# Patient Record
Sex: FEMALE | Race: WHITE | Employment: OTHER | URBAN - METROPOLITAN AREA
[De-identification: names, ages, dates, MRNs, and addresses within clinical notes are randomized per-mention and may not be internally consistent; named-entity substitution may affect disease eponyms.]

---

## 2017-03-01 ENCOUNTER — HOSPITAL ENCOUNTER (OUTPATIENT)
Dept: MAMMOGRAPHY | Age: 77
Discharge: HOME | End: 2017-03-01
Attending: INTERNAL MEDICINE
Payer: MEDICARE

## 2017-03-01 DIAGNOSIS — I97.2 POSTMASTECTOMY LYMPHEDEMA SYNDROME: ICD-10-CM

## 2017-03-01 DIAGNOSIS — Z85.3 PERSONAL HISTORY OF MALIGNANT NEOPLASM OF BREAST: ICD-10-CM

## 2017-03-01 PROCEDURE — 77062 BREAST TOMOSYNTHESIS BI: CPT

## 2017-03-29 ENCOUNTER — HOSPITAL ENCOUNTER (OUTPATIENT)
Dept: PHYSICAL THERAPY | Age: 77
Discharge: HOME | End: 2017-03-29

## 2017-03-29 NOTE — PROGRESS NOTES
[address] [facility]  [address]  [place] [address]      INITIAL EVALUATION    NAME: Chad Smith AGE: 78 y.o. GENDER: female  DATE: 3/29/2017  REFERRING PHYSICIAN: Barbara Albert MD  HISTORY AND BACKGROUND: Patient is a 77 y/o breast cancer survivor with initial diagnosis 12/2013, IDC, T1cN1, triple negative. Per oncology note, pt underwent neoadjuvant chemotherapy, with 1.9 cm tumor, 2+/14 lymph nodes ALND, grade 3. Pt reports early onset of lymphedema L UE, however, states she has not had consistent management of condition, with progression of swelling/skin and tissue changes significant over the past 2 years. See further medical history and medications as noted below. Primary Diagnosis:  · L UE lymphedema, secondary (I89.0)  Other Treatment Diagnoses:   Swelling not relieved by elevation (R60.0 localized edema)   Lack of coordination (Ataxic R27.0; other lack of coordination R27.8; unspecified lack of coordination R27.9)   Malignant neoplasm of breast (C50.919)  Morbid Obesity (E66.01)  Date of Onset: 12/2013; progression over the past 2 years. Present Symptoms and Functional Limitations: L UE swelling, limiting appropriate fit of shirts/blouses, unable to wear wedding rings, pain/tightness axillary region.     Lymphedema Life Impact Scale: 29/72; 43% impairment  Past Medical History:   Past Medical History:   Diagnosis Date    Breast cancer ([facility] [address] 2015    Left    Neurological disorder     chemotherapy induced neuropathy    Radiation therapy complication 8040    left breast ca     Past Surgical History:   Procedure Laterality Date    HX BREAST BIOPSY Left yrs    neg; stereotactic    HX BREAST LUMPECTOMY Left 2015    HX BREAST REDUCTION Bilateral yrs ago     Current Medications:    Per MD note/patient report:  Duloxetine 60 mg daily; Gabapentin 600 mg 2x/day; lorazepam 1 mg prn; omeprazole 20 mg daily, zolpidem 10 mg at bedtime; Celecoxib 200 mg daily; biotin; Aminophylline. No current facility-administered medications for this encounter. 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 Pt reports lymphedema onset after lumpectomy/ALND, however, has become progressively worse over the past 2 years. Living Situation: lives with spouse, one daughter who lives locally. Trainable Caregiver?: yes, spouse. Self-care/ADLs: indep     Mobility: indep   Sleeping Arrangement:  bed   Adaptive Equipment Owned: none  Previous Therapy:  Limited, intermittent at best, to address lymphedema. Compression/Lymphedema Equipment:  Multiple compression sleeves, however, has not worn them regularly and did not bring to appt. SUBJECTIVE:   \"My arm is getting worse. \"  Pt states she wants to pursue treatment of L UE lymphedema, and understands that she needs consistency for optimal management of condition. Pt states she has tried over the counter compression sleeves without successful fit/comfort. Patients goals for therapy: decrease R UE limb size and learn how to manage lymphedema. OBJECTIVE DATA SUMMARY:   EXAMINATION/PRESENTATION/DECISION MAKING:   Pain:  Pain Scale 1: Numeric (0 - 10)  Pain Intensity 1: 2  Pain Location 1: Arm    Skin and Tissue Assessment:  Dermal Status:  ( x)  Intact ( )  Dry   ( )  Tenuous ( )  Flaky   ( )  Wound/lesion present (x )  Scars: breast incisions well healed, no increase in tension; axillary incision adherent, moderate tension. Note heavy cording with skin traction axilla to proximal upper arm.    ( )  Dermatitis    Texture/Consistency:  ( x)  Boggy ( )  Pitting Edema   ( )  Brawny ( )  Combination   (x )  Fibrotic/Woody: forearm    Pigmentation/Color Change:  ( )  Normal ( )  Hemosiderin   ( )  Red ( )  Erythematous   ( x)  Hyperpigmented: mild L UE ( )  Hyperlipodermatosclerosis   Anomalies: na  ( )  Lymphorrhea ( )  Vesicles   ( )  Petechiae ( )  Warty Vercusis   ( )  Bullae ( )  Papilloma   Circulatory:  ( )  MALNIA ( )  Varicosities:   (x )  Pulses: radial palpable bilateral UE ( )  Vascular studies ruled out DVT in past   ( )  DVT History    Nails:  (x )  Normal  ( )  Fungus  Stemmers Sign: positive L    Height:  Height: 5' 1\" (154.9 cm)  Weight:  Weight: 60.9 kg (134 lb 3.2 oz)   BMI:  BMI (calculated): 25.4  (36 or greater: adversely affecting lymphedema)  Volumetric Measurements:   Right:  1759.71 mL Left:  2086.37mL   % Difference: 18.56% Dominance: R UE   (See scanned graph)  Range of Motion: bilateral UE WFL all joints, all motions. Strength: bilateral UE grossly 5/5 with MMT. Sensation:  Decreased distal fingers, neuropathic pain bilateral LE, feet/mid lower leg. Mobility:  Bed/Chair Mobility:  indep Transfers:  indep   Sitting Balance:  good Standing Balance:  good   Gait:  indep without device. Wheelchair Mobility:  na   Endurance:  Intact for gait community distances, normal daily activities, travel. Stairs:  Not assessed. Safety:  Patient is alert and oriented:  x4   Safety awareness:  intact   Fall Risk?:  Low, neuropathy   Patient given written fall prevention handout: Yes   Precautions:  Standard lymphedema precautions to include avoiding blood pressure readings, injections and IVs or other procedures/acts that could lead to broken skin on affected area, and avoiding excessive heat, resistive activity or altitude without compression garment    Functional Measure:   LLIS  In compliance with CMSs Claims Based Outcome Reporting, the following G-code set was chosen for this patient based on their primary functional limitation being treated:     The outcome measure chosen to determine the severity of the functional limitation was the LLIS with a score of 29/72 which was correlated with the impairment scale. ? Other PT/OT Primary Functional Limitations:     - CURRENT STATUS: CK - 40%-59% impaired, limited or restricted    - GOAL STATUS: CJ - 20%-39% impaired, limited or restricted    - D/C STATUS:  ---------------To be determined---------------     Physical Therapy Evaluation Charge Determination   History Examination Presentation Decision-Making   MEDIUM  Complexity : 1-2 comorbidities / personal factors will impact the outcome/ POC  MEDIUM Complexity : 3 Standardized tests and measures addressing body structure, function, activity limitation and / or participation in recreation  MEDIUM Complexity : Evolving with changing characteristics  MEDIUM Complexity : FOTO score of 26-74      Based on the above components, the patient evaluation is determined to be of the following complexity level: MEDIUM    Evaluation Time: 9:40-10:12 am  32 minutes    TREATMENT PROVIDED:   1. Treatment description:  The patient was educated regarding the role and function of the lymphatic system, and instructed in the lymphedema management protocol of complete decongestive therapy (CDT). This includes skin care to prevent breakdown or infection, lymphedema exercises, custom compression therapy options (bandaging, compression garments, compression pump, Jon Ripper, [employer]Pak, [employer], etc. as needed), and decongestion with manual lymphatic drainage as indicated. We discussed the need for consistent compression system for lymphedema management. Diaphragmatic breathing instruction and skin care education was performed,applying low pH lotion to extremity using upward strokes to stimulate lymphatic vessels. Pt was given information regarding obtaining bandaging supplies. Initiated MLD today with upper quadrant sequence performed followed by therapist applying lotion L UE upward strokes, with pt to continue day/night.     Treatment time:  10:14-10:59 am  Minutes: 44 minutes    Manual therapy 3    2. Treatment description:  Pt instructed in the following exercises performing with assist of therapist:  In supine, hands behind head with with shoulder abd 90, holding for 30 seconds at comfortable tension axilla, repeating x 3 reps, 2x/day. Standing shoulder abd to 90 with wrist extension, alternating elbow flex holding 10 seconds, full extension holding 10 seconds, at comfortable point of tension axilla, 10 reps, repeating 2-3x/day. Pt demonstrates good understanding of HEP. Treatment time:  11:00-11:12 am   Minutes: 12 minutes    Therapeutic exercise 1    ASSESSMENT:   Lisa Carr is a 78 y.o. female who presents with stage II moderate lymphedema, L UE, breast cancer related. Lymphedema is long-standing, however, has progressed over the past 2 years, continuing to become more severe in nature, with patient reporting she has not had consistent management of lymphedema since cancer diagnosis and treatment in 2013. Note mild hand involvement, with pt no longer able to wear wedding rings on that hand. Pt has limited knowledge regarding lymphedema pathophysiology or management of condition, however, is motivated to proceed with treatment. Pt will likely require day/night time compression, and would benefit from vasopneumatic pump for home management based on response to treatment in clinic. Pt agreeable to proceeding with treatment, including compression bandaging, stating spouse will be agreeable to assist her with bandaging. Neuropathy may interfere with independent management of compression bandaging/garments. Patient will benefit from complete decongestive therapy (CDT) including manual lymphatic drainage (MLD) technique, short-stretch textile bandages/compression system to decongest limb, and kinesiotaping as appropriate.   Patient will receive instruction in proper skin care to recognize signs/symptoms of and prevent infection, therapeutic exercise, and self-MLD for independent home program and restorative lymphatic performance. This care is medically necessary due to the infection risk with lymphedema, and to improve functional activities. CDT is necessary to resolve swelling to allow patient to return to wearing normal clothes/footwear, and prevent worsening of symptoms, such as venous stasis ulcerations, infections, or hospitalizations. Patient will be independent with home program strategies to allow improved ADL ability and mobility and to allow patient to return to greatest functional independence. Rehabilitation potential is considered to be Good. Factors which may influence rehabilitation potential include neuropathy UE/LE, long-standing lymphedema increasing in severity, progressed to stage II with hand involvement noted. Patient will benefit from 1-2x/week physical therapy visits over 4-8 weeks to optimize improvement in these areas. PLAN OF CARE:   Recommendations and Planned Interventions:  Manual lymph drainage/complete decongestive therapy  Multi-layer compression bandaging (short-stretch)  Compression garment fitting/provision  Lymphedema therapeutic exercise  AROM/PROM/Strength/Coordination  Self-care training  Functional mobility training  Education in skin care and lymphedema precautions  Self-MLD education per home program  Self-bandaging education per home program  Caregiver education as needed  Wound care as needed     GOALS  Short term goals  Time frame: 2-4 weeks  1. Instruct the patient to be independent with proper skin care to prevent future skin breakdown and decrease the potential risk for infections that are associated with Lymphedema. 2. Patient will be independent with a personal lymphedema exercise program to assist with the lymphatic flow and reduce limb volume L UE by 200-250 mL. 3. Patient will understand the signs and symptoms of acute infection. Long term goals  Time frame: 4-8 weeks    1.    Patient will have knowledge of the compression options and acquire a safe and   appropriate daytime and night time compression system to prevent    re-accumulation of fluid. 2.  Patient or family will be able to don/doff garments independently. Garment   system effectiveness will be evaluated prior to discharge for better long-term   management and outcomes. 3.  Improvement in LLIS by 4-6 points. 4.   To transition patient to independent restorative phase of CDT. Patient has participated in goal setting and agrees to work toward plan of care. Patient was instructed to call if questions or concerns arise. Thank you for this referral.  Marla Solo, PT, CLT   Time Calculation: 92 mins    TREATMENT PLAN EFFECTIVE DATES:   3/29/2017 TO 6/25/2017  I have read the above plan of care for Karen Beltrán. I certify the above prescribed services are required by this patient and are medically necessary.   The above plan of care has been developed in conjunction with the lymphedema/physical therapist.       Physician Signature: ____________________________________Date:______________

## 2017-03-30 VITALS
BODY MASS INDEX: 25.34 KG/M2 | DIASTOLIC BLOOD PRESSURE: 82 MMHG | SYSTOLIC BLOOD PRESSURE: 138 MMHG | WEIGHT: 134.2 LBS | HEIGHT: 61 IN | HEART RATE: 73 BPM | OXYGEN SATURATION: 96 %

## 2017-04-03 ENCOUNTER — HOSPITAL ENCOUNTER (OUTPATIENT)
Dept: PHYSICAL THERAPY | Age: 77
Discharge: HOME | End: 2017-04-03
Payer: MEDICARE

## 2017-04-03 PROCEDURE — 97110 THERAPEUTIC EXERCISES: CPT

## 2017-04-03 PROCEDURE — 97140 MANUAL THERAPY 1/> REGIONS: CPT

## 2017-04-03 NOTE — PROGRESS NOTES
[facility]  Lymphedema Clinic and Cancer Rehabilitation  [facility]  [address] [place] [address],[address]  [place]      LYmphedema Therapy  Visit: 2    [x]        Daily note               []       30 day/10th visit progress note    NAME: Patria Nicole  DATE: 4/3/2017    GOALS  Short term goals  Time frame: 2-4 weeks  1. Instruct the patient to be independent with proper skin care to prevent future skin breakdown and decrease the potential risk for infections that are associated with Lymphedema. 2. Patient will be independent with a personal lymphedema exercise program to assist with the lymphatic flow and reduce limb volume L UE by 200-250 mL. 3. Patient will understand the signs and symptoms of acute infection. Long term goals  Time frame: 4-8 weeks     1. Patient will have knowledge of the compression options and acquire a safe and   appropriate daytime and night time compression system to prevent    re-accumulation of fluid. 2. Patient or family will be able to don/doff garments independently. Garment   system effectiveness will be evaluated prior to discharge for better long-term   management and outcomes. 3. Improvement in LLIS by 4-6 points. 4. To transition patient to independent restorative phase of CDT. SUBJECTIVE REPORT:  Pt arrives for treatment today stating she has order bandaging supplies, however, has not received them yet. Pt agreeable to proceeding with treatment today, including compression bandaging. Pt states she has continued HEP as instructed. Pain: 1/10, axilla/upper arm. Gait:  indep  ADLs:  indep  Treatment Response:  Pt required assistance to don bra secondary to bandaging application. Pt states spouse will assist.  Pt provided with information regarding arm covering to allow her to shower at home. Reviewed packet received at evaluation.   Function: see eval.  Began MLD (manual lymphatic drainage) techniques with lotioning and skin care and range of motion exercise routine without props. TREATMENT AND OBJECTIVE DATA SUMMARY:       Therapeutic Exercise/Procedure: 10:52-11:05 am 13 minutes             Free exercises/ROM: Shoulder flex/abd/ER ROM x 10. Reviewed shoulder abd at 90 paired with shoulder ER/elbow and wrist extension, to continue 3x/day with pt tolerating well in clinic. Home program: Patient to perform daily to BID skin care, deep abdominal breathing, exercise routine. Rationale: Exercise will increase the lymph angiomotoricity and tissue pressure of the skin and thus decrease swelling. Manual Therapy 59 minutes   Treatment time: 9:51-10:50 am  Area to decongest: L UE   Sequence used and effectiveness: Upper quadrant sequence and L UE sequence to elbow. Stim L inguinal/R axillary nodes;activated AAA/CLAUDIO/L AI anastomoses. L breast sequence stationary circles x 5, repeating 5 pathways lateral to medial, stim parasternal nodes. Repeated sequence L posterior upper quadrant. UE sequence to elbow. Skin traction/axillary cording techniques including lifting/gliding. Pt tolerated well. Heavy cording in axilla. Skin/wound care/debridement: Intact. Applied Eucerin lotion using upward strokes to stimulate lymphatic vessels. Upper extremity compression: L UE  Finger wraps  stockinette  Cast padding  Comprilan 6, 8 cm x 2    Pt provided with compression bandaging management at home as follows:  Compression bandaging instructions:  1. Compression bandaging will be applied twice a week by therapist in clinic, with adjustments to be made at home as indicated if bandaging becomes loose or uncomfortable.     2. If tolerated, remain bandaged between appointments with therapist, removing under the following conditions--DO NOT WAIT FOR A RETURN PHONE Shu 69:    -Numbness/tingling in extremity different from what you have experienced without the bandages in place.  -Compromise in circulation, monitoring blood refill into toes after applying gentle pressure to toes.  -Onset of pain in extremity that is sudden and severe in nature. -Redness, warmth in limb, and/or fever, flu-like symptoms, which may indicate infection. If this occurs, call your doctor right away. If you note a sudden increase in swelling in extremity, with or without redness/warmth, go to the emergency room as soon as possible to have blood clot ruled out. 3. Compression bandaging supplies that can be laundered are the brown compression wraps and any foam applied for padding. Launder in washer/dryer with NO fabric softener, bleach, woolite. Dry on a low heat. 4. Once compression garments are ordered, compression bandaging will be continued until garments arrive for fitting. This process can take several weeks. Kinesiotaping: karel   Girth/Volume measurement: Reviewed results of volumetric measurements taken on evaluation. TOTAL TREATMENT 74 mins     Circumferential Limb Measurements:  Affected Side:L UE   Left (cm) Right (cm)   Base 3rd finger 6.2           Palm 18.7           Wrist 15.4           Mid Forearm 26.4           Elbow 26.5           Axilla 30.8           Length                   ASSESSMENT:   Treatment effectiveness and tolerance: Pt tolerated treatment well. Pt may require assistance with donning bra/housework/cooking during bandaging phase of treatment or until she accommodates to bandaging. Pt agreeable to continuing HEP as instructed. Progress toward goals: Ongoing. PLAN OF CARE:   Changes to the plan of care: Assess response to compression bandaging. Repeat limb volume measurements. Assess compression garment patient currently owns, which she is to bring to next scheduled appt. Measure for appropriate compression garments per limb volume goals as noted above.    Frequency: 1-2x/week   Other: karel Hernandez, PT, CLT

## 2017-04-06 ENCOUNTER — HOSPITAL ENCOUNTER (OUTPATIENT)
Dept: PHYSICAL THERAPY | Age: 77
Discharge: HOME | End: 2017-04-06
Payer: MEDICARE

## 2017-04-06 PROCEDURE — 97140 MANUAL THERAPY 1/> REGIONS: CPT

## 2017-04-06 PROCEDURE — 97110 THERAPEUTIC EXERCISES: CPT

## 2017-04-06 NOTE — PROGRESS NOTES
[facility]  Lymphedema Clinic and Cancer Rehabilitation  [address] [place] [address]  [address] [place] [address],[address]  Francisca Black      LYmphedema Therapy  Visit: 3    [x]        Daily note               []       30 day/10th visit progress note    NAME: Linnette Hernandez  DATE: 4/6/2017    GOALS  Short term goals  Time frame: 2-4 weeks  1. Instruct the patient to be independent with proper skin care to prevent future skin breakdown and decrease the potential risk for infections that are associated with Lymphedema. 2. Patient will be independent with a personal lymphedema exercise program to assist with the lymphatic flow and reduce limb volume L UE by 200-250 mL. 3. Patient will understand the signs and symptoms of acute infection. Long term goals  Time frame: 4-8 weeks     1. Patient will have knowledge of the compression options and acquire a safe and   appropriate daytime and night time compression system to prevent    re-accumulation of fluid. 2. Patient or family will be able to don/doff garments independently. Garment   system effectiveness will be evaluated prior to discharge for better long-term   management and outcomes. 3. Improvement in LLIS by 4-6 points. 4. To transition patient to independent restorative phase of CDT. SUBJECTIVE REPORT:  Pt arrives for treatment today with bandaging supplies. States she did okay in compression bandaging L UE, arriving with bandaging intact. Pt agreeable to continuing with treatment today, including compression bandaging. Pt states she has continued HEP as instructed. Pain: 1/10, axilla/upper arm. Gait:  indep  ADLs:  indep  Treatment Response:  Pt required assistance to don bra secondary to bandaging application. Pt states spouse will assist.  Pt provided with information regarding arm covering to allow her to shower at home. Reviewed packet received at evaluation.   Function: see brittny.  Began MLD (manual lymphatic drainage) techniques with lotioning and skin care and range of motion exercise routine without props. TREATMENT AND OBJECTIVE DATA SUMMARY:       Therapeutic Exercise/Procedure: 10:59-11:010 am 11 minutes             Free exercises/ROM: Shoulder flex/abd/ER ROM x 10. Reviewed shoulder abd at 90 paired with shoulder ER/elbow and wrist extension, to continue 3x/day with pt tolerating well in clinic. Pt performs the following ex in clinic with assist of therapist:  Cervical lateral flex/rotation R/L, scapular elevation/depression, protraction/retraction, arm swings, towel raises, paddling x 5 reps. Pt advised to continue 2x/day. Home program: Patient to perform daily to BID skin care, deep abdominal breathing, exercise routine. Rationale: Exercise will increase the lymph angiomotoricity and tissue pressure of the skin and thus decrease swelling. Manual Therapy 65 minutes   Treatment time: 9:50-10:55 am  Area to decongest: L UE   Sequence used and effectiveness: Upper quadrant sequence and L UE sequence to elbow. Stim L inguinal/R axillary nodes;activated AAA/CLAUDIO/L AI anastomoses. L breast sequence stationary circles x 5, repeating 5 pathways lateral to medial, stim parasternal nodes. Repeated sequence L posterior upper quadrant. Full UE sequence performed. Skin traction/axillary cording techniques including lifting/gliding. Pt tolerated well. Heavy cording in axilla. STM L pect major/minor. Skin/wound care/debridement: Intact. Applied Eucerin lotion using upward strokes to stimulate lymphatic vessels. Upper extremity compression: L UE  Finger wraps  stockinette  Biafleece  Comprilan 6, 8 cm x 2    Pt provided with compression bandaging management at home as follows:  Compression bandaging instructions:  1.   Compression bandaging will be applied twice a week by therapist in clinic, with adjustments to be made at home as indicated if bandaging becomes loose or uncomfortable. 2. If tolerated, remain bandaged between appointments with therapist, removing under the following conditions--DO NOT WAIT FOR A RETURN PHONE CALL FROM CLINIC:    -Numbness/tingling in extremity different from what you have experienced without the bandages in place.  -Compromise in circulation, monitoring blood refill into toes after applying gentle pressure to toes.  -Onset of pain in extremity that is sudden and severe in nature. -Redness, warmth in limb, and/or fever, flu-like symptoms, which may indicate infection. If this occurs, call your doctor right away. If you note a sudden increase in swelling in extremity, with or without redness/warmth, go to the emergency room as soon as possible to have blood clot ruled out. 3. Compression bandaging supplies that can be laundered are the brown compression wraps and any foam applied for padding. Launder in washer/dryer with NO fabric softener, bleach, woolite. Dry on a low heat. 4. Once compression garments are ordered, compression bandaging will be continued until garments arrive for fitting. This process can take several weeks. Kinesiotaping: na   Girth/Volume measurement: Repeated limb volumes with slight elevation noted. Improvement in breast and hand swelling visibly noted. See scanned graph. TOTAL TREATMENT 80 mins     ASSESSMENT:   Treatment effectiveness and tolerance: Pt tolerated treatment well. Pt may require assistance with donning bra/housework/cooking during bandaging phase of treatment or until she accommodates to bandaging. Pt agreeable to continuing HEP as instructed. Progress toward goals: Ongoing. PLAN OF CARE:   Changes to the plan of care: Assess continued response to compression bandaging. Assess compression garment patient currently owns, which she is to bring to next scheduled appt.   Measure for appropriate compression garments per limb volume goals as noted above.   Frequency: 1-2x/week   Other: na       Kalina Bender, PT, CLT

## 2017-04-10 ENCOUNTER — HOSPITAL ENCOUNTER (OUTPATIENT)
Dept: PHYSICAL THERAPY | Age: 77
Discharge: HOME | End: 2017-04-10
Payer: MEDICARE

## 2017-04-10 PROCEDURE — 97016 VASOPNEUMATIC DEVICE THERAPY: CPT

## 2017-04-10 PROCEDURE — 97140 MANUAL THERAPY 1/> REGIONS: CPT

## 2017-04-10 NOTE — PROGRESS NOTES
[facility]  Lymphedema Clinic and Cancer Rehabilitation  [address] [place] [address]  [address] [place] [address],[address]  Francisca Black      LYmphedema Therapy  Visit: 3    [x]        Daily note               []       30 day/10th visit progress note    NAME: Linnette Hernandez  DATE: 4/10/2017    GOALS  Short term goals  Time frame: 2-4 weeks  1. Instruct the patient to be independent with proper skin care to prevent future skin breakdown and decrease the potential risk for infections that are associated with Lymphedema. 2. Patient will be independent with a personal lymphedema exercise program to assist with the lymphatic flow and reduce limb volume L UE by 200-250 mL. 3. Patient will understand the signs and symptoms of acute infection. Long term goals  Time frame: 4-8 weeks     1. Patient will have knowledge of the compression options and acquire a safe and   appropriate daytime and night time compression system to prevent    re-accumulation of fluid. 2. Patient or family will be able to don/doff garments independently. Garment   system effectiveness will be evaluated prior to discharge for better long-term   management and outcomes. 3. Improvement in LLIS by 4-6 points. 4. To transition patient to independent restorative phase of CDT. SUBJECTIVE REPORT:  Pt arrives for treatment today with bandaging supplies. States she did okay in compression bandaging L UE, arriving with bandaging intact. Pt agreeable to continuing with treatment today, including compression bandaging. Pt states she has continued HEP as instructed. Pain: 1/10, axilla/upper arm. Gait:  indep  ADLs:  indep  Treatment Response:  Pt required assistance to don bra secondary to bandaging application. Pt states spouse will assist.  Pt provided with information regarding arm covering to allow her to shower at home. Reviewed packet received at evaluation.   Function: see brittny.  Began MLKEIKO (manual lymphatic drainage) techniques with lotioning and skin care and range of motion exercise routine without props. TREATMENT AND OBJECTIVE DATA SUMMARY:       Therapeutic Exercise/Procedure:               Free exercises/ROM: Shoulder flex/abd/ER ROM x 10. Pt to continue all exercises at home as previously instructed within limits of pain: shoulder abd at 90 paired with shoulder ER/elbow and wrist extension, to continue 3x/day with pt tolerating well in clinic. Cervical lateral flex/rotation R/L, scapular elevation/depression, protraction/retraction, arm swings, towel raises, paddling x 5 reps. Pt advised to continue 2x/day. Home program: Patient to perform daily to BID skin care, deep abdominal breathing, exercise routine. Rationale: Exercise will increase the lymph angiomotoricity and tissue pressure of the skin and thus decrease swelling. Manual Therapy: 10:47-11:05 am  Vasopneumatic pump trial:  9:35-10:45 am 18 minutes  70 minutes   Treatment time:   Area to decongest: L UE   Sequence used and effectiveness: Vasopneumatic pump trial with Tactile . Skin/wound care/debridement: Intact. Applied Eucerin lotion using upward strokes to stimulate lymphatic vessels. Upper extremity compression: L UE  Finger wraps  stockinette  Cast padding  Biafleece  Comprilan 6, 8 cm x 2    Pt provided with compression bandaging management at home as follows:  Compression bandaging instructions:  1. Compression bandaging will be applied twice a week by therapist in clinic, with adjustments to be made at home as indicated if bandaging becomes loose or uncomfortable.     2. If tolerated, remain bandaged between appointments with therapist, removing under the following conditions--DO NOT WAIT FOR A RETURN PHONE CALL FROM CLINIC:    -Numbness/tingling in extremity different from what you have experienced without the bandages in place.  -Compromise in circulation, monitoring blood refill into toes after applying gentle pressure to toes.  -Onset of pain in extremity that is sudden and severe in nature. -Redness, warmth in limb, and/or fever, flu-like symptoms, which may indicate infection. If this occurs, call your doctor right away. If you note a sudden increase in swelling in extremity, with or without redness/warmth, go to the emergency room as soon as possible to have blood clot ruled out. 3. Compression bandaging supplies that can be laundered are the brown compression wraps and any foam applied for padding. Launder in washer/dryer with NO fabric softener, bleach, woolite. Dry on a low heat. 4. Once compression garments are ordered, compression bandaging will be continued until garments arrive for fitting. This process can take several weeks. Kinesiotaping: na   Girth/Volume measurement: See girth measurements below   Affected Side: L UE   Left (cm) Right (cm)   Base 3rd finger 6.2           Palm 18.7           Wrist 15.7           Mid Forearm 25.8           Elbow 25.7           Axilla  30.8           Length                 TOTAL TREATMENT 95 mins     ASSESSMENT:   Treatment effectiveness and tolerance: Pt tolerated treatment well. Pt may require assistance with donning bra/housework/cooking during bandaging phase of treatment or until she accommodates to bandaging. Pt agreeable to continuing HEP as instructed. Good response to pump trial.     Progress toward goals: Ongoing. PLAN OF CARE:   Changes to the plan of care: Assess continued response to compression bandaging. Measure for appropriate compression garments per limb volume goals as noted above. Vasopneumatic pump medically necessary for success with home management of lymphedema. Pt does have L breast lymphedema involvement, with would benefit from upgraded pump, however, currently eligible for extremity pump thru Medicare.    Frequency: 1-2x/week   Other: na Sylvester Repress, PT, CLT

## 2017-04-13 ENCOUNTER — HOSPITAL ENCOUNTER (OUTPATIENT)
Dept: PHYSICAL THERAPY | Age: 77
Discharge: HOME | End: 2017-04-13
Payer: MEDICARE

## 2017-04-13 PROCEDURE — 97140 MANUAL THERAPY 1/> REGIONS: CPT

## 2017-04-13 NOTE — PROGRESS NOTES
[facility]  Lymphedema Clinic and Cancer Rehabilitation  [address] [facility]  [address] [place] [address],[address]  Francisca Black      LYmphedema Therapy  Visit: 4    [x]        Daily note               []       30 day/10th visit progress note    NAME: Missy Hurt  DATE: 4/13/2017    GOALS  Short term goals  Time frame: 2-4 weeks  1. Instruct the patient to be independent with proper skin care to prevent future skin breakdown and decrease the potential risk for infections that are associated with Lymphedema. 2. Patient will be independent with a personal lymphedema exercise program to assist with the lymphatic flow and reduce limb volume L UE by 200-250 mL. 3. Patient will understand the signs and symptoms of acute infection. Long term goals  Time frame: 4-8 weeks     1. Patient will have knowledge of the compression options and acquire a safe and   appropriate daytime and night time compression system to prevent    re-accumulation of fluid. 2. Patient or family will be able to don/doff garments independently. Garment   system effectiveness will be evaluated prior to discharge for better long-term   management and outcomes. 3. Improvement in LLIS by 4-6 points. 4. To transition patient to independent restorative phase of CDT. SUBJECTIVE REPORT:  Pt arrives for treatment today with bandaging supplies. States she took a shower just before coming to her appointment and some of the bandages on her hand got wet. Pain: Patient expressing pain in bilateral thumbs due to arthritis. Gait:  indep  ADLs:  indep  Treatment Response:  Pt tolerated MLD and MLB well. .  Reviewed packet received at evaluation. Function: see eval.  Began MLD (manual lymphatic drainage) techniques with lotioning and skin care and range of motion exercise routine without props.     TREATMENT AND OBJECTIVE DATA SUMMARY:       Therapeutic Exercise/Procedure:               Free exercises/ROM: Shoulder flex/abd/ER ROM x 10. Pt to continue all exercises at home as previously instructed within limits of pain: shoulder abd at 90 paired with shoulder ER/elbow and wrist extension, to continue 3x/day with pt tolerating well in clinic. Cervical lateral flex/rotation R/L, scapular elevation/depression, protraction/retraction, arm swings, towel raises, paddling x 5 reps. Pt advised to continue 2x/day. Home program: Patient to perform daily to BID skin care, deep abdominal breathing, exercise routine. Rationale: Exercise will increase the lymph angiomotoricity and tissue pressure of the skin and thus decrease swelling. Manual Therapy:   Vasopneumatic pump       Treatment time:   Area to decongest: L UE   Sequence used and effectiveness: MLD:             Upper quadrant sequence and L UE sequence to elbow. Stim L inguinal/R axillary nodes;activated AAA/CLAUDIO/L AI anastomoses. L breast sequence stationary circles x 5,  Repeated sequence L posterior upper quadrant. Full UE sequence performed.            Skin/wound care/debridement: Intact. Applied Eucerin lotion using upward strokes to stimulate lymphatic vessels. Upper extremity compression: L UE  Finger wraps/coban  stockinette  Cast padding  Biafleece  Comprilan 4 cm x 2, 8, & 10 cm      ADDENDUM:  Patient returned to the clinic later in the afternoon due to the coban bothering her arthritic thumb. Coban was removed, finger wraps intact and patient's LUE was re-wrapped as above without the coban. Patient without complaints at that time. Pt provided with compression bandaging management at home as follows:  Compression bandaging instructions:  1. Compression bandaging will be applied twice a week by therapist in clinic, with adjustments to be made at home as indicated if bandaging becomes loose or uncomfortable.     2. If tolerated, remain bandaged between appointments with therapist, removing under the following conditions--DO NOT WAIT FOR A RETURN PHONE CALL FROM CLINIC:    -Numbness/tingling in extremity different from what you have experienced without the bandages in place.  -Compromise in circulation, monitoring blood refill into toes after applying gentle pressure to toes.  -Onset of pain in extremity that is sudden and severe in nature. -Redness, warmth in limb, and/or fever, flu-like symptoms, which may indicate infection. If this occurs, call your doctor right away. If you note a sudden increase in swelling in extremity, with or without redness/warmth, go to the emergency room as soon as possible to have blood clot ruled out. 3. Compression bandaging supplies that can be laundered are the brown compression wraps and any foam applied for padding. Launder in washer/dryer with NO fabric softener, bleach, woolite. Dry on a low heat. 4. Once compression garments are ordered, compression bandaging will be continued until garments arrive for fitting. This process can take several weeks. Kinesiotaping: na   Girth/Volume measurement: See girth measurements below   Affected Side: L UE   Left (cm) Right (cm)   Base 3rd finger     6.5        Palm  18.5           Wrist     15.3        Mid Forearm     26.0        Elbow 25.9        Axilla 31.0        Length                 TOTAL TREATMENT 75 mins     ASSESSMENT:   Treatment effectiveness and tolerance: Pt tolerated treatment well. Gross measurements improved since last visit following MLB. See above. Progress toward goals: Ongoing. PLAN OF CARE:   Changes to the plan of care: Assess continued response to compression bandaging. Measure for appropriate compression garments. Vasopneumatic pump medically necessary for success with home management of lymphedema. Pt does have L breast lymphedema involvement, with would benefit from upgraded pump, however, currently eligible for extremity pump thru Medicare.    Frequency: 1-2x/week Other: karel OLVERA

## 2017-04-17 ENCOUNTER — HOSPITAL ENCOUNTER (OUTPATIENT)
Dept: PHYSICAL THERAPY | Age: 77
Discharge: HOME | End: 2017-04-17
Payer: MEDICARE

## 2017-04-17 PROCEDURE — 97140 MANUAL THERAPY 1/> REGIONS: CPT

## 2017-04-17 NOTE — PROGRESS NOTES
[facility]  Lymphedema Clinic and Cancer Rehabilitation  [address] [place] [address]  [address] [place] [address],[address]  Francisca Black      LYmphedema Therapy  Visit: 5    [x]        Daily note               []       30 day/10th visit progress note    NAME: Jacquie Mitchell  DATE: 4/17/2017    GOALS  Short term goals  Time frame: 2-4 weeks  1. Instruct the patient to be independent with proper skin care to prevent future skin breakdown and decrease the potential risk for infections that are associated with Lymphedema. 2. Patient will be independent with a personal lymphedema exercise program to assist with the lymphatic flow and reduce limb volume L UE by 200-250 mL. 3. Patient will understand the signs and symptoms of acute infection. Long term goals  Time frame: 4-8 weeks     1. Patient will have knowledge of the compression options and acquire a safe and   appropriate daytime and night time compression system to prevent    re-accumulation of fluid. 2. Patient or family will be able to don/doff garments independently. Garment   system effectiveness will be evaluated prior to discharge for better long-term   management and outcomes. 3. Improvement in LLIS by 4-6 points. 4. To transition patient to independent restorative phase of CDT. SUBJECTIVE REPORT:  Pt arrives for treatment today with compression sleeve donned, stating she had to remove compression bandaging secondary to pain at hand/fingers. Pt states bandaging was removed last night. Pain: 1/10, axilla/upper arm. Gait:  indep  ADLs:  indep  Treatment Response:  Pt required assistance to don bra secondary to bandaging application. Pt states spouse will assist.  Pt provided with information regarding arm covering to allow her to shower at home. Reviewed packet received at evaluation.   Function: see eval.  Began MLD (manual lymphatic drainage) techniques with lotioning and skin care and range of motion exercise routine without props. TREATMENT AND OBJECTIVE DATA SUMMARY:       Therapeutic Exercise/Procedure:               Free exercises/ROM: Shoulder flex/abd/ER ROM x 10. Pt to continue all exercises at home as previously instructed within limits of pain: shoulder abd at 90 paired with shoulder ER/elbow and wrist extension, to continue 3x/day with pt tolerating well in clinic. Cervical lateral flex/rotation R/L, scapular elevation/depression, protraction/retraction, arm swings, towel raises, paddling x 5 reps. Pt advised to continue 2x/day. Home program: Patient to perform daily to BID skin care, deep abdominal breathing, exercise routine. Rationale: Exercise will increase the lymph angiomotoricity and tissue pressure of the skin and thus decrease swelling. Manual Therapy: 12:50-1:52 pm 62 minutes   Treatment time:   Area to decongest: L UE   Sequence used and effectiveness: Upper quadrant/UE MLD sequence. Stim R axillary/L inguinal nodes. Activated AAA/CLAUDIO/L AI anastomoses. Addressed L breast fullness with stationary circles across L superior breast from lateral aspect to midline of upper quadrant, stim parasternal nodes after 5 repetitions, repeating x 5. Full UE sequence including medial to lateral upper arm sequence. Skin/wound care/debridement: Intact. Applied Eucerin lotion using upward strokes to stimulate lymphatic vessels. Upper extremity compression: L UE  Deferred finger wraps  stockinette  Cast padding  Biafleece  Comprilan 6, 8 cm x 2    Pt provided with compression bandaging management at home as follows:  Compression bandaging instructions:  1. Compression bandaging will be applied twice a week by therapist in clinic, with adjustments to be made at home as indicated if bandaging becomes loose or uncomfortable.     2. If tolerated, remain bandaged between appointments with therapist, removing under the following conditions--DO NOT WAIT FOR A RETURN PHONE CALL FROM CLINIC:    -Numbness/tingling in extremity different from what you have experienced without the bandages in place.  -Compromise in circulation, monitoring blood refill into toes after applying gentle pressure to toes.  -Onset of pain in extremity that is sudden and severe in nature. -Redness, warmth in limb, and/or fever, flu-like symptoms, which may indicate infection. If this occurs, call your doctor right away. If you note a sudden increase in swelling in extremity, with or without redness/warmth, go to the emergency room as soon as possible to have blood clot ruled out. 3. Compression bandaging supplies that can be laundered are the brown compression wraps and any foam applied for padding. Launder in washer/dryer with NO fabric softener, bleach, woolite. Dry on a low heat. 4. Once compression garments are ordered, compression bandaging will be continued until garments arrive for fitting. This process can take several weeks. Kinesiotaping: na   Girth/Volume measurement: See girth measurements below   Affected Side: L UE   Left (cm) Right (cm)   Base 3rd finger 6.2           Palm 18.6           Wrist 15.2           Mid Forearm 26.2           Elbow 29.8           Axilla 30.6           Length                 TOTAL TREATMENT 62 mins     ASSESSMENT:   Treatment effectiveness and tolerance: Pt tolerated treatment well. Pt may require assistance with donning bra/housework/cooking during bandaging phase of treatment or until she accommodates to bandaging. Pt agreeable to continuing HEP as instructed. Good response to pump trial prior visit. Progress toward goals: Ongoing. PLAN OF CARE:   Changes to the plan of care: Assess continued response to compression bandaging. Measure for appropriate compression garments per limb volume goals as noted above. Vasopneumatic pump medically necessary for success with home management of lymphedema.   Pt does have L breast lymphedema involvement, with would benefit from upgraded pump, however, currently eligible for extremity pump thru Medicare. Repeat limb volume measurements.    Frequency: 1-2x/week   Other: karel De Oliveira PT, CLT

## 2017-04-20 ENCOUNTER — HOSPITAL ENCOUNTER (OUTPATIENT)
Dept: PHYSICAL THERAPY | Age: 77
Discharge: HOME | End: 2017-04-20
Payer: MEDICARE

## 2017-04-20 PROCEDURE — 97110 THERAPEUTIC EXERCISES: CPT

## 2017-04-20 PROCEDURE — 97140 MANUAL THERAPY 1/> REGIONS: CPT

## 2017-04-20 NOTE — PROGRESS NOTES
[facility]  Lymphedema Clinic and Cancer Rehabilitation  [address] [facility]  [address] [place] [address],[address]  Francisca Black      LYmphedema Therapy  Visit: 6    [x]        Daily note               []       30 day/10th visit progress note    NAME: Adriel Lora  DATE: 4/20/2017    GOALS  Short term goals  Time frame: 2-4 weeks  1. Instruct the patient to be independent with proper skin care to prevent future skin breakdown and decrease the potential risk for infections that are associated with Lymphedema. 4/20/2017 ongoing  2. Patient will be independent with a personal lymphedema exercise program to assist with the lymphatic flow and reduce limb volume L UE by 200-250 mL.  4/20/2017  Goal unmet. Note reduction L UE volume of 87.43%, however, wrist and forearm measurements remain unchanged or elevated. 3. Patient will understand the signs and symptoms of acute infection. Long term goals  Time frame: 4-8 weeks     1. Patient will have knowledge of the compression options and acquire a safe and   appropriate daytime and night time compression system to prevent    re-accumulation of fluid. 4/20/2017  Pt to bring compression garments which she has worn previously without success to next scheduled appt for therapist to assess fit and effectiveness. 2. Patient or family will be able to don/doff garments independently. Garment   system effectiveness will be evaluated prior to discharge for better long-term   management and outcomes. 3. Improvement in LLIS by 4-6 points. 4. To transition patient to independent restorative phase of CDT. SUBJECTIVE REPORT:  Pt arrives for treatment today with compression bandaging in place. Pt states she continues to note fullness in wrist/forearm area. Pain: 1/10, axilla/upper arm. Gait:  indep  ADLs:  indep  Treatment Response:  Limited limb volume reduction noted.   Forearm and wrist remain dense and girth measurements remaining unchanged or elevated. Reviewed packet received at evaluation. Function: see eval.  Began MLD (manual lymphatic drainage) techniques with lotioning and skin care and range of motion exercise routine without props. TREATMENT AND OBJECTIVE DATA SUMMARY:       Therapeutic Exercise/Procedure: 12:23-12:32 9 minutes             Free exercises/ROM: Shoulder flex/abd/ER ROM x 10. Pt to continue all exercises at home as previously instructed within limits of pain: shoulder abd at 90 paired with shoulder ER/elbow and wrist extension, to continue 3x/day with pt tolerating well in clinic. Cervical lateral flex/rotation R/L, scapular elevation/depression, protraction/retraction, arm swings, towel raises, paddling x 5 reps. Pt advised to continue 2x/day. Home program: Patient to perform daily to BID skin care, deep abdominal breathing, exercise routine. Rationale: Exercise will increase the lymph angiomotoricity and tissue pressure of the skin and thus decrease swelling. Manual Therapy: 11:15 am-12:22 pm 62 minutes   Treatment time:   Area to decongest: L UE   Sequence used and effectiveness: Upper quadrant/UE MLD sequence. Stim R axillary/L inguinal nodes. Activated AAA/CLAUDIO/L AI anastomoses. Addressed L breast fullness with stationary circles across L superior breast from lateral aspect to midline of upper quadrant, stim parasternal nodes after 5 repetitions, repeating x 5. Full UE sequence including medial to lateral upper arm sequence. Skin/wound care/debridement: Intact. Applied anti-itch lotion using upward strokes to stimulate lymphatic vessels. Upper extremity compression: L UE  Deferred finger wraps  stockinette  Cast padding  Biafleece  Comprilan 6, 8 cm x 2    Pt provided with compression bandaging management at home as follows:  Compression bandaging instructions:  1.   Compression bandaging will be applied twice a week by therapist in clinic, with adjustments to be made at home as indicated if bandaging becomes loose or uncomfortable. 2. If tolerated, remain bandaged between appointments with therapist, removing under the following conditions--DO NOT WAIT FOR A RETURN PHONE CALL FROM CLINIC:    -Numbness/tingling in extremity different from what you have experienced without the bandages in place.  -Compromise in circulation, monitoring blood refill into toes after applying gentle pressure to toes.  -Onset of pain in extremity that is sudden and severe in nature. -Redness, warmth in limb, and/or fever, flu-like symptoms, which may indicate infection. If this occurs, call your doctor right away. If you note a sudden increase in swelling in extremity, with or without redness/warmth, go to the emergency room as soon as possible to have blood clot ruled out. 3. Compression bandaging supplies that can be laundered are the brown compression wraps and any foam applied for padding. Launder in washer/dryer with NO fabric softener, bleach, woolite. Dry on a low heat. 4. Once compression garments are ordered, compression bandaging will be continued until garments arrive for fitting. This process can take several weeks. Kinesiotaping: na   Girth/Volume measurement: Limb volume measurements performed today, with limited reduction by 87.43 mL noted, limb volume discrepancy remaining over 13%, with goal being under 5%. L wrist measurement remaining elevated at 15.3, proximal to wrist elevated from 16.3 cm to 16.9 cm. TOTAL TREATMENT 77 mins     ASSESSMENT:   Treatment effectiveness and tolerance: Pt tolerated treatment well. Pt may require assistance with donning bra/housework/cooking during bandaging phase of treatment or until she accommodates to bandaging. Pt agreeable to continuing HEP as instructed. Good response to pump trial prior visit.   Pt has received treatment for lymphedema prior to treatment in this clinic, with poor success in managing lymphedema with compression garment wear, elevation, and exercise. Pt will require day/night time compression garment wear and daily use of vasopneumatic pump for successful home management of lymphedema. Progress toward goals: Ongoing. PLAN OF CARE:   Changes to the plan of care: Assess continued response to compression bandaging. Measure for appropriate compression garments per limb volume goals as noted above. Vasopneumatic pump medically necessary for success with home management of lymphedema. Pt does have L breast lymphedema involvement, with would benefit from upgraded pump, however, currently eligible for extremity pump thru Medicare. Repeat limb volume measurements.    Frequency: 1-2x/week   Other: na Chilton Krabbe, PT, CLT

## 2017-04-24 ENCOUNTER — HOSPITAL ENCOUNTER (OUTPATIENT)
Dept: PHYSICAL THERAPY | Age: 77
Discharge: HOME | End: 2017-04-24
Payer: MEDICARE

## 2017-04-24 PROCEDURE — 97140 MANUAL THERAPY 1/> REGIONS: CPT

## 2017-04-24 NOTE — PROGRESS NOTES
[facility]  Lymphedema Clinic and Cancer Rehabilitation  [address] [facility]  [address] [place] [address],[address]  Francisca Black      LYmphedema Therapy  Visit: 7    [x]        Daily note               []       30 day/10th visit progress note    NAME: Consuelo May  DATE: 4/24/2017    GOALS  Short term goals  Time frame: 2-4 weeks  1. Instruct the patient to be independent with proper skin care to prevent future skin breakdown and decrease the potential risk for infections that are associated with Lymphedema. 4/20/2017 ongoing  2. Patient will be independent with a personal lymphedema exercise program to assist with the lymphatic flow and reduce limb volume L UE by 200-250 mL.  4/20/2017  Goal unmet. Note reduction L UE volume of 87.43%, however, wrist and forearm measurements remain unchanged or elevated. 3. Patient will understand the signs and symptoms of acute infection. Long term goals  Time frame: 4-8 weeks     1. Patient will have knowledge of the compression options and acquire a safe and   appropriate daytime and night time compression system to prevent    re-accumulation of fluid. 4/20/2017  Pt to bring compression garments which she has worn previously without success to next scheduled appt for therapist to assess fit and effectiveness. 2. Patient or family will be able to don/doff garments independently. Garment   system effectiveness will be evaluated prior to discharge for better long-term   management and outcomes. 3. Improvement in LLIS by 4-6 points. 4. To transition patient to independent restorative phase of CDT. SUBJECTIVE REPORT:  Pt arrives for treatment today with compression bandaging in place. Pt brings previously worn compression garments, which are either well worn or not medical compression grade, and in need of replacement. Pain: 1/10, axilla/upper arm.            Gait:  indep  ADLs:  indep  Treatment Response:  Limited limb volume reduction noted. Forearm and wrist remain dense and girth measurements remaining unchanged or elevated. Reviewed packet received at evaluation. Function: see eval.  Began MLD (manual lymphatic drainage) techniques with lotioning and skin care and range of motion exercise routine without props. TREATMENT AND OBJECTIVE DATA SUMMARY:       Therapeutic Exercise/Procedure: 12:23-12:32 9 minutes             Free exercises/ROM: Shoulder flex/abd/ER ROM x 10. Pt to continue all exercises at home as previously instructed within limits of pain: shoulder abd at 90 paired with shoulder ER/elbow and wrist extension, to continue 3x/day with pt tolerating well in clinic. Cervical lateral flex/rotation R/L, scapular elevation/depression, protraction/retraction, arm swings, towel raises, paddling x 5 reps. Pt advised to continue 2x/day. Home program: Patient to perform daily to BID skin care, deep abdominal breathing, exercise routine. Rationale: Exercise will increase the lymph angiomotoricity and tissue pressure of the skin and thus decrease swelling. Manual Therapy: 12:40-1:45 pm 65 minutes   Treatment time:   Area to decongest: L UE   Sequence used and effectiveness: Deferred                    Skin/wound care/debridement: Intact. Applied anti-itch lotion using upward strokes to stimulate lymphatic vessels. Upper extremity compression: Pt shown compression garment options including colors and fabrics. Measurements completed for RM daytime armsleeve and gauntlet for daily wear, glove for travel. Measurements also completed for custom [employer] Tribute full armsleeve. Order sent to [facility] with Tracy Mins to contact patient for payment. Pt advised that garments will be fit in the clinic, with bandaging to remain form of compression until garments obtained.       L UE  Deferred finger wraps  stockinette  Cast padding  Biafleece  Comprilan 6, 8 cm x 2    Pt provided with compression bandaging management at home as follows:  Compression bandaging instructions:  1. Compression bandaging will be applied twice a week by therapist in clinic, with adjustments to be made at home as indicated if bandaging becomes loose or uncomfortable. 2. If tolerated, remain bandaged between appointments with therapist, removing under the following conditions--DO NOT WAIT FOR A RETURN PHONE CALL FROM CLINIC:    -Numbness/tingling in extremity different from what you have experienced without the bandages in place.  -Compromise in circulation, monitoring blood refill into toes after applying gentle pressure to toes.  -Onset of pain in extremity that is sudden and severe in nature. -Redness, warmth in limb, and/or fever, flu-like symptoms, which may indicate infection. If this occurs, call your doctor right away. If you note a sudden increase in swelling in extremity, with or without redness/warmth, go to the emergency room as soon as possible to have blood clot ruled out. 3. Compression bandaging supplies that can be laundered are the brown compression wraps and any foam applied for padding. Launder in washer/dryer with NO fabric softener, bleach, woolite. Dry on a low heat. 4. Once compression garments are ordered, compression bandaging will be continued until garments arrive for fitting. This process can take several weeks. Kinesiotaping: na   Girth/Volume measurement: See scanned garment measurements. TOTAL TREATMENT 65 mins     ASSESSMENT:   Treatment effectiveness and tolerance: Pt tolerated treatment well. Pt may require assistance with donning bra/housework/cooking during bandaging phase of treatment or until she accommodates to bandaging. Pt agreeable to continuing HEP as instructed. Good response to pump trial prior visit.   Pt has received treatment for lymphedema prior to treatment in this clinic, with poor success in managing lymphedema with compression garment wear, elevation, and exercise. Pt will require day/night time compression garment wear and daily use of vasopneumatic pump for successful home management of lymphedema. Garment order placed today, with patient to make payment to [facility]. Progress toward goals: Ongoing. PLAN OF CARE:   Changes to the plan of care: Assess continued response to compression bandaging. Resume MLD. Fit compression garments upon receiving. Vasopneumatic pump medically necessary for success with home management of lymphedema. Pt does have L breast lymphedema involvement, with would benefit from upgraded pump, however, currently eligible for extremity pump thru Medicare. Repeat limb volume measurements.    Frequency: 1-2x/week   Other: karel Becerra, PT, CLT

## 2017-04-27 ENCOUNTER — HOSPITAL ENCOUNTER (OUTPATIENT)
Dept: PHYSICAL THERAPY | Age: 77
Discharge: HOME | End: 2017-04-27
Payer: MEDICARE

## 2017-04-27 PROCEDURE — 97016 VASOPNEUMATIC DEVICE THERAPY: CPT

## 2017-04-27 PROCEDURE — 97140 MANUAL THERAPY 1/> REGIONS: CPT

## 2017-04-27 NOTE — PROGRESS NOTES
[facility]  Lymphedema Clinic and Cancer Rehabilitation  [address] [facility]  [address] [place] [address],[address]  Francisca Black      LYmphedema Therapy  Visit: 8    [x]        Daily note               []       30 day/10th visit progress note    NAME: Adriel Lora  DATE: 4/27/2017    GOALS  Short term goals  Time frame: 2-4 weeks  1. Instruct the patient to be independent with proper skin care to prevent future skin breakdown and decrease the potential risk for infections that are associated with Lymphedema. 4/20/2017 ongoing  2. Patient will be independent with a personal lymphedema exercise program to assist with the lymphatic flow and reduce limb volume L UE by 200-250 mL.  4/20/2017  Goal unmet. Note reduction L UE volume of 87.43%, however, wrist and forearm measurements remain unchanged or elevated. 3. Patient will understand the signs and symptoms of acute infection. Long term goals  Time frame: 4-8 weeks     1. Patient will have knowledge of the compression options and acquire a safe and   appropriate daytime and night time compression system to prevent    re-accumulation of fluid. 4/20/2017  Pt to bring compression garments which she has worn previously without success to next scheduled appt for therapist to assess fit and effectiveness. 2. Patient or family will be able to don/doff garments independently. Garment   system effectiveness will be evaluated prior to discharge for better long-term   management and outcomes. 3. Improvement in LLIS by 4-6 points. 4. To transition patient to independent restorative phase of CDT. SUBJECTIVE REPORT:  Pt arrives for treatment today with compression bandaging in place. Pt brings previously worn compression garments, which are either well worn or not medical compression grade, and in need of replacement. Pain: 1/10, axilla/upper arm.            Gait:  indep  ADLs:  indep  Treatment Response:  Limited limb volume reduction noted. Forearm and wrist remain dense and girth measurements remaining unchanged or elevated. Reviewed packet received at evaluation. Function: see eval.  Began MLD (manual lymphatic drainage) techniques with lotioning and skin care and range of motion exercise routine without props. TREATMENT AND OBJECTIVE DATA SUMMARY:       Therapeutic Exercise/Procedure: 12:23-12:32 9 minutes             Free exercises/ROM: Shoulder flex/abd/ER ROM x 10. Pt to continue all exercises at home as previously instructed within limits of pain: shoulder abd at 90 paired with shoulder ER/elbow and wrist extension, to continue 3x/day with pt tolerating well in clinic. Cervical lateral flex/rotation R/L, scapular elevation/depression, protraction/retraction, arm swings, towel raises, paddling x 5 reps. Pt advised to continue 2x/day. Home program: Patient to perform daily to BID skin care, deep abdominal breathing, exercise routine. Rationale: Exercise will increase the lymph angiomotoricity and tissue pressure of the skin and thus decrease swelling. Manual Therapy: 12:10-12:30 pm  Vasopneumatic pump:  11:05 am-12:05 pm 20 minutes  60 minutes   Treatment time:   Area to decongest: L UE   Vasopneumatic pump: Pt placed in [place] for L UE pump sequence, low pressure, with good tolerance noted. Skin/wound care/debridement: Intact. Applied anti-itch lotion using upward strokes to stimulate lymphatic vessels. Upper extremity compression: L UE  Deferred finger wraps  stockinette  Cast padding  Biafleece  Comprilan 6, 8 cm x 2    Pt provided with compression bandaging management at home as follows:  Compression bandaging instructions:  1. Compression bandaging will be applied twice a week by therapist in clinic, with adjustments to be made at home as indicated if bandaging becomes loose or uncomfortable.     2. If tolerated, remain bandaged between appointments with therapist, removing under the following conditions--DO NOT WAIT FOR A RETURN PHONE CALL FROM CLINIC:    -Numbness/tingling in extremity different from what you have experienced without the bandages in place.  -Compromise in circulation, monitoring blood refill into toes after applying gentle pressure to toes.  -Onset of pain in extremity that is sudden and severe in nature. -Redness, warmth in limb, and/or fever, flu-like symptoms, which may indicate infection. If this occurs, call your doctor right away. If you note a sudden increase in swelling in extremity, with or without redness/warmth, go to the emergency room as soon as possible to have blood clot ruled out. 3. Compression bandaging supplies that can be laundered are the brown compression wraps and any foam applied for padding. Launder in washer/dryer with NO fabric softener, bleach, woolite. Dry on a low heat. 4. Once compression garments are ordered, compression bandaging will be continued until garments arrive for fitting. This process can take several weeks. Kinesiotaping: na   Girth/Volume measurement: deferred     TOTAL TREATMENT 85 mins     ASSESSMENT:   Treatment effectiveness and tolerance: Pt tolerated treatment well. Pt may require assistance with donning bra/housework/cooking during bandaging phase of treatment or until she accommodates to bandaging. Pt agreeable to continuing HEP as instructed. Good response to pump trial today. Pt has received treatment for lymphedema prior to treatment in this clinic, with poor success in managing lymphedema with compression garment wear, elevation, and exercise. Pt will require day/night time compression garment wear and daily use of vasopneumatic pump for successful home management of lymphedema. Garment order placed with pt stating she has made payment. Progress toward goals: Ongoing. PLAN OF CARE:   Changes to the plan of care: Assess continued response to compression bandaging.  Fit compression garments upon receiving. Vasopneumatic pump medically necessary for success with home management of lymphedema. Pt does have L breast lymphedema involvement, with would benefit from upgraded pump, however, currently eligible for extremity pump thru Medicare. Repeat limb volume measurements.    Frequency: 1-2x/week   Other: karel Reyes PT, CLT

## 2017-05-02 ENCOUNTER — HOSPITAL ENCOUNTER (OUTPATIENT)
Dept: PHYSICAL THERAPY | Age: 77
Discharge: HOME | End: 2017-05-02
Payer: MEDICARE

## 2017-05-02 PROCEDURE — 97760 ORTHOTIC MGMT&TRAING 1ST ENC: CPT

## 2017-05-02 PROCEDURE — 97140 MANUAL THERAPY 1/> REGIONS: CPT

## 2017-05-02 NOTE — PROGRESS NOTES
[facility]  Lymphedema Clinic and Cancer Rehabilitation  [address] [place] [address]  [address] [place] [address],[address]  Francisca Black      LYmphedema Therapy  Visit: 9    [x]        Daily note               []       30 day/10th visit progress note    NAME: Hemant Soni  DATE: 5/2/2017    GOALS  Short term goals  Time frame: 2-4 weeks  1. Instruct the patient to be independent with proper skin care to prevent future skin breakdown and decrease the potential risk for infections that are associated with Lymphedema. 4/20/2017 ongoing  2. Patient will be independent with a personal lymphedema exercise program to assist with the lymphatic flow and reduce limb volume L UE by 200-250 mL.  4/20/2017  Goal unmet. Note reduction L UE volume of 87.43%, however, wrist and forearm measurements remain unchanged or elevated. 5/2/2017 limb volume reduced by 107.87, discrepancy at 12.43%, L UE>R UE.  3. Patient will understand the signs and symptoms of acute infection. 5/2/2017  Long term goals  Time frame: 4-8 weeks     1. Patient will have knowledge of the compression options and acquire a safe and   appropriate daytime and night time compression system to prevent    re-accumulation of fluid. 4/20/2017  Pt to bring compression garments which she has worn previously without success to next scheduled appt for therapist to assess fit and effectiveness. 5/2/2017 day/night time garments order. Pump ordered. Daytime compression garments fit. 2. Patient or family will be able to don/doff garments independently. Garment   system effectiveness will be evaluated prior to discharge for better long-term   management and outcomes. 5/2/2017  Daytime garments fit well, with pt able to don/doff with assist of spouse as needed. 3. Improvement in LLIS by 4-6 points. 5/2/2017 goal met. 4. To transition patient to independent restorative phase of CDT.        SUBJECTIVE REPORT:  Pt arrives for treatment today with compression bandaging in place. Pt brings previously worn compression garments, which are either well worn or not medical compression grade, and in need of replacement. Pain: 1/10, axilla/upper arm. Gait:  indep  ADLs:  indep  Treatment Response:  Limited limb volume reduction noted. Forearm and wrist remain dense and girth measurements remaining unchanged or elevated. Reviewed packet received at evaluation. Function: In compliance with CMSs Claims Based Outcome Reporting, the following G-code set was chosen for this patient based on their primary functional limitation being treated: The outcome measure chosen to determine the severity of the functional limitation was the LLIS with a score of 9/72 which was correlated with the impairment scale. ? Other PT/OT Primary Functional Limitations:     - CURRENT STATUS: CI - 1%-19% impaired, limited or restricted    - GOAL STATUS: CJ - 20%-39% impaired, limited or restricted    - D/C STATUS:  ---------------To be determined---------------       TREATMENT AND OBJECTIVE DATA SUMMARY:       Therapeutic Exercise/Procedure: Deferred             Free exercises/ROM: Pt to continue all exercises at home as previously instructed within limits of pain with compression garments in place: shoulder abd at 90 paired with shoulder ER/elbow and wrist extension, to continue 3x/day with pt tolerating well in clinic. Cervical lateral flex/rotation R/L, scapular elevation/depression, protraction/retraction, arm swings, towel raises, paddling x 5 reps. Pt advised to continue 2x/day. Home program: Patient to perform daily to BID skin care, deep abdominal breathing, exercise routine. Rationale: Exercise will increase the lymph angiomotoricity and tissue pressure of the skin and thus decrease swelling.        Manual Therapy: 9:40-10:06 am  Orthotic management:  10:10-10:56 26 minutes  46 minutes   Treatment time:   Area to decongest: L UE Vasopneumatic pump: Pt advised [employer] will schedule appt upon her receiving pump, with home demo to be completed at that time. Skin/wound care/debridement: Intact. Applied anti-itch lotion using upward strokes to stimulate lymphatic vessels. Manual therapy: Limb volume measurements completed--overall reduction L UE by 107.87mL. See scanned graph. Spouse instructed in compression bandaging as follows, applying at night until night time compression garment received:    L UE  stockinette  Cast padding  Biafleece  Comprilan 4, 6, 8 cm  Pt and spouse verbalize good understanding of bandaging instructions, stating they will continue night time application of bandaging until night time compression garment received. Self MLD sequence instruction performed, with focus on upper quadrant sequence, stim R axillary/L inguinal nodes, and activating anastomoses prior to pump application. Written instructions with pictures provided to patient to continue sequence at home daily with pump applications                            Orthotic management: Pt instructed in don/doff daytime CCL 1 RM compression sleeve/glove and gauntlet. Pt advised to don/doff garments with glove to prevent damage to garment. Therapist donned garments initially while instructing patient in appropriate donning/doffing technique, with pt and spouse able to don/doff garments prior to leaving clinic. Pt advised to fit night time garment if she receives prior to upcoming travel, otherwise, to continue bandaging at night, and return to clinic for fit of night time garment, and for assessment of effectiveness of daytime compression garments. Pt advised in home management of lymphedema as follows:  Compression garment routine:  1. Apply daytime compression stocking to clean, dry extremity first thing in the morning, EVERY MORNING.     2. Wear garment until bedtime, adjusting throughout the day as needed should garment wrinkle or crease. Problem areas can be at joint, and top of garment, ensuring proximal aspect of garment is positioned appropriately on extremity. 3. Launder compression garments nightly either by hand or washing machine in a garment bag or pillowcase. Use mild detergent with NO FABRIC SOFTENER, NO BLEACH, NO WOOLITE. Wash in cool/warm water. 4. Dry garment in dryer on low heat right side out in garment bag or pillowcase. 5. Daytime compression garments are NOT safe to sleep in, so remove at bedtime. 6. Perform skin care to extremity, cleansing skin daily with soap and water, and using low pH lotion, upward strokes to stimulate lymphatic vessels. 7. Apply pump and perform self MLD prior to transitioning to night time compression garment as instructed to wear while sleeping, adjusting throughout the night as needed for comfort. 8. Perform exercise program with daytime compression stockings on. Signs/Symptoms of infection include:  Fever, flu-like symptoms, pain/redness/warmth in extremity, sometimes with increase in swelling present. Stop wearing your compression garment if this occurs. Call your doctor immediately or go to the Emergency room to address potential infection. Remove compression with changes in circulation or numbness in extremity, different from what you may experience when not wearing compression garment, pain, unexplained shortness of breath. TOTAL TREATMENT 76 mins     ASSESSMENT:   Treatment effectiveness and tolerance: Pt and spouse verbalize understanding of appropriate don/doff of compression garments for daytime wear, and application of compression bandaging for night time wear until night time compression garment received. Pt verbalizes understanding of vasopneumatic pump demo to be provided by [employer] rep upon receiving pump. Pt agreeable to continuing HEP as instructed.    Pt has received treatment for lymphedema prior to treatment in this clinic, with poor success in managing lymphedema with compression garment wear, elevation, and exercise. However, pt has responded well to treatment. Pt will require day/night time compression garment wear and daily use of vasopneumatic pump for successful home management of lymphedema. Good fit of daytime compression garments, awaiting arrival of night time garment, with compression bandaging to be applied at night until garment received. Progress toward goals: Ongoing. PLAN OF CARE:   Changes to the plan of care: Assess response to compression garment wear during daytime hours, night time compression bandaging, and daily use of vasopneumatic pump. Fit night time compression garment upon receiving. Vasopneumatic pump medically necessary for success with home management of lymphedema. Pt does have L breast lymphedema involvement, with would benefit from upgraded pump, however, currently eligible for extremity pump thru Medicare. Repeat limb volume measurements.    Frequency: 1-2x/week   Other: na       Kee Natan, PT, CLT

## 2017-05-09 ENCOUNTER — OFFICE VISIT (OUTPATIENT)
Dept: INTERNAL MEDICINE CLINIC | Age: 77
End: 2017-05-09

## 2017-05-09 VITALS
RESPIRATION RATE: 16 BRPM | DIASTOLIC BLOOD PRESSURE: 70 MMHG | HEART RATE: 76 BPM | BODY MASS INDEX: 25.49 KG/M2 | TEMPERATURE: 98.9 F | OXYGEN SATURATION: 99 % | SYSTOLIC BLOOD PRESSURE: 115 MMHG | HEIGHT: 61 IN | WEIGHT: 135 LBS

## 2017-05-09 DIAGNOSIS — Z23 ENCOUNTER FOR IMMUNIZATION: ICD-10-CM

## 2017-05-09 DIAGNOSIS — E78.5 DYSLIPIDEMIA, GOAL LDL BELOW 100: ICD-10-CM

## 2017-05-09 DIAGNOSIS — C50.912 MALIGNANT NEOPLASM OF LEFT FEMALE BREAST, UNSPECIFIED SITE OF BREAST: Primary | ICD-10-CM

## 2017-05-09 DIAGNOSIS — G62.9 NEUROPATHY: ICD-10-CM

## 2017-05-09 RX ORDER — ZALEPLON 10 MG/1
10 CAPSULE ORAL
COMMUNITY
End: 2017-06-22 | Stop reason: SDUPTHER

## 2017-05-09 RX ORDER — CELECOXIB 200 MG/1
200 CAPSULE ORAL DAILY
COMMUNITY
End: 2017-06-22 | Stop reason: SDUPTHER

## 2017-05-09 RX ORDER — GABAPENTIN 300 MG/1
300 CAPSULE ORAL 3 TIMES DAILY
COMMUNITY
End: 2017-06-22 | Stop reason: SDUPTHER

## 2017-05-09 RX ORDER — OMEPRAZOLE 20 MG/1
20 CAPSULE, DELAYED RELEASE ORAL DAILY
COMMUNITY
End: 2017-06-22 | Stop reason: SDUPTHER

## 2017-05-09 RX ORDER — DULOXETIN HYDROCHLORIDE 60 MG/1
60 CAPSULE, DELAYED RELEASE ORAL DAILY
COMMUNITY
End: 2017-06-22 | Stop reason: SDUPTHER

## 2017-05-09 NOTE — PROGRESS NOTES
HISTORY OF PRESENT ILLNESS  Tesfaye Medina is a 78 y.o. female. HPI  New to me and this practice, comes in to get established. Moved from [place] to be closer to daughter, Tray Phillips, who is a nurse at [employer]. Issues:  1. Breast cancer diagnosed in 2014, left breast with 2/14 nodes positive, triple negative marker. Had lumpectomy, radiation and chemo. 2. Lymphedema of left arm. Does have a sleeve and has a pump. Has seen the clinic here. 3. 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 4. Osteoarthritis of hands. Uses Celebrex 200 mg.  5. GERD, stable on Prilosec. Preventive Care:  Colonoscopy in 2016, endoscopy in 2012. She's had a hysterectomy. She's never had a Pneumovax. Social History:  .  has a pacer and defibrillator. Four daughters, one of whom lives here. 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 Review of Systems   Constitutional: Negative for chills, fever and weight loss. Dec libido   Respiratory: Negative for cough, shortness of breath and wheezing. Cardiovascular: Negative for chest pain, palpitations, orthopnea, leg swelling and PND. Gastrointestinal: Positive for constipation. Negative for abdominal pain, diarrhea, heartburn and nausea. Musculoskeletal: Positive for joint pain. Negative for myalgias. Neurological: Positive for sensory change (feet bilat). Negative for dizziness and headaches. Psychiatric/Behavioral: Negative for depression. All other systems reviewed and are negative. Physical Exam   Constitutional: She is oriented to person, place, and time. She appears well-developed and well-nourished. HENT:   Head: Normocephalic and atraumatic.    Right Ear: Tympanic membrane, external ear and ear canal normal.   Left Ear: Tympanic membrane, external ear and ear canal normal.   Nose: Nose normal.   Mouth/Throat: Oropharynx is clear and moist and mucous membranes are normal. No oropharyngeal exudate. Eyes: Conjunctivae are normal. Pupils are equal, round, and reactive to light. Right eye exhibits no discharge. Left eye exhibits no discharge. Neck: Normal range of motion. Neck supple. Carotid bruit is not present. No thyromegaly present. Cardiovascular: Normal rate, regular rhythm, S1 normal, S2 normal, normal heart sounds and intact distal pulses. No murmur heard. Pulmonary/Chest: Effort normal and breath sounds normal. No respiratory distress. She has no wheezes. She has no rales. Abdominal: Soft. Bowel sounds are normal. She exhibits no mass. There is no tenderness. There is no rebound. Musculoskeletal: She exhibits no edema. Wearing sleeve on left arm   Lymphadenopathy:     She has no cervical adenopathy. Neurological: She is alert and oriented to person, place, and time. Skin: No rash noted. Psychiatric: She has a normal mood and affect. Her behavior is normal.   Nursing note and vitals reviewed. ASSESSMENT and PLAN  Rk Vera was seen today for establish care.     Diagnoses and all orders for this visit:    Malignant neoplasm of left female breast, unspecified site of breast ([facility] [address]  -     CBC WITH AUTOMATED DIFF    Dyslipidemia, goal LDL below 307  -     METABOLIC PANEL, COMPREHENSIVE  -     LIPID PANEL  -     TSH 3RD GENERATION  -     T4, FREE    Neuropathy    Cont on cymbalta and neurontin and offered to write meds if dr Mesa Mini her oncologist prefers i take over  She will get colonoscopy record

## 2017-05-11 ENCOUNTER — HOSPITAL ENCOUNTER (OUTPATIENT)
Dept: LAB | Age: 77
Discharge: HOME | End: 2017-05-11
Payer: MEDICARE

## 2017-05-11 PROCEDURE — 85025 COMPLETE CBC W/AUTO DIFF WBC: CPT

## 2017-05-11 PROCEDURE — 84443 ASSAY THYROID STIM HORMONE: CPT

## 2017-05-11 PROCEDURE — 84439 ASSAY OF FREE THYROXINE: CPT

## 2017-05-11 PROCEDURE — 80061 LIPID PANEL: CPT

## 2017-05-11 PROCEDURE — 36415 COLL VENOUS BLD VENIPUNCTURE: CPT

## 2017-05-11 PROCEDURE — 80053 COMPREHEN METABOLIC PANEL: CPT

## 2017-05-12 LAB
ALBUMIN SERPL-MCNC: 4.3 G/DL (ref 3.5–4.8)
ALBUMIN/GLOB SERPL: 1.7 {RATIO} (ref 1.2–2.2)
ALP SERPL-CCNC: 66 IU/L (ref 39–117)
ALT SERPL-CCNC: 17 IU/L (ref 0–32)
AST SERPL-CCNC: 17 IU/L (ref 0–40)
BASOPHILS # BLD AUTO: 0 X10E3/UL (ref 0–0.2)
BASOPHILS NFR BLD AUTO: 1 %
BILIRUB SERPL-MCNC: 0.3 MG/DL (ref 0–1.2)
BUN SERPL-MCNC: 21 MG/DL (ref 8–27)
BUN/CREAT SERPL: 27 (ref 12–28)
CALCIUM SERPL-MCNC: 9.8 MG/DL (ref 8.7–10.3)
CHLORIDE SERPL-SCNC: 103 MMOL/L (ref 96–106)
CHOLEST SERPL-MCNC: 225 MG/DL (ref 100–199)
CO2 SERPL-SCNC: 23 MMOL/L (ref 18–29)
CREAT SERPL-MCNC: 0.78 MG/DL (ref 0.57–1)
EOSINOPHIL # BLD AUTO: 0.2 X10E3/UL (ref 0–0.4)
EOSINOPHIL NFR BLD AUTO: 4 %
ERYTHROCYTE [DISTWIDTH] IN BLOOD BY AUTOMATED COUNT: 14 % (ref 12.3–15.4)
GLOBULIN SER CALC-MCNC: 2.6 G/DL (ref 1.5–4.5)
GLUCOSE SERPL-MCNC: 91 MG/DL (ref 65–99)
HCT VFR BLD AUTO: 36.5 % (ref 34–46.6)
HDLC SERPL-MCNC: 75 MG/DL
HGB BLD-MCNC: 12.4 G/DL (ref 11.1–15.9)
IMM GRANULOCYTES # BLD: 0 X10E3/UL (ref 0–0.1)
IMM GRANULOCYTES NFR BLD: 0 %
INTERPRETATION, 910389: NORMAL
LDLC SERPL CALC-MCNC: 128 MG/DL (ref 0–99)
LYMPHOCYTES # BLD AUTO: 1.5 X10E3/UL (ref 0.7–3.1)
LYMPHOCYTES NFR BLD AUTO: 34 %
MCH RBC QN AUTO: 30.5 PG (ref 26.6–33)
MCHC RBC AUTO-ENTMCNC: 34 G/DL (ref 31.5–35.7)
MCV RBC AUTO: 90 FL (ref 79–97)
MONOCYTES # BLD AUTO: 0.4 X10E3/UL (ref 0.1–0.9)
MONOCYTES NFR BLD AUTO: 10 %
NEUTROPHILS # BLD AUTO: 2.3 X10E3/UL (ref 1.4–7)
NEUTROPHILS NFR BLD AUTO: 51 %
PLATELET # BLD AUTO: 276 X10E3/UL (ref 150–379)
POTASSIUM SERPL-SCNC: 4.2 MMOL/L (ref 3.5–5.2)
PROT SERPL-MCNC: 6.9 G/DL (ref 6–8.5)
RBC # BLD AUTO: 4.06 X10E6/UL (ref 3.77–5.28)
SODIUM SERPL-SCNC: 144 MMOL/L (ref 134–144)
T4 FREE SERPL-MCNC: 0.96 NG/DL (ref 0.82–1.77)
TRIGL SERPL-MCNC: 108 MG/DL (ref 0–149)
TSH SERPL DL<=0.005 MIU/L-ACNC: 1.58 UIU/ML (ref 0.45–4.5)
VLDLC SERPL CALC-MCNC: 22 MG/DL (ref 5–40)
WBC # BLD AUTO: 4.3 X10E3/UL (ref 3.4–10.8)

## 2017-06-20 ENCOUNTER — HOSPITAL ENCOUNTER (OUTPATIENT)
Dept: PHYSICAL THERAPY | Age: 77
Discharge: HOME | End: 2017-06-20
Payer: MEDICARE

## 2017-06-20 PROCEDURE — G8991 OTHER PT/OT GOAL STATUS: HCPCS

## 2017-06-20 PROCEDURE — G8992 OTHER PT/OT  D/C STATUS: HCPCS

## 2017-06-20 PROCEDURE — G8990 OTHER PT/OT CURRENT STATUS: HCPCS

## 2017-06-20 PROCEDURE — 97760 ORTHOTIC MGMT&TRAING 1ST ENC: CPT

## 2017-06-20 PROCEDURE — 97140 MANUAL THERAPY 1/> REGIONS: CPT

## 2017-06-20 NOTE — PROGRESS NOTES
[facility]  Lymphedema Clinic and Cancer Rehabilitation  [address] [place] [address]  [address] [place] [address],[address]  Francisca Black      LYmphedema Therapy  Visit: 9    [x]        Daily note               []       30 day/10th visit progress note    NAME: Adriel Lora  DATE: 6/20/2017    GOALS  Short term goals  Time frame: 2-4 weeks  1. Instruct the patient to be independent with proper skin care to prevent future skin breakdown and decrease the potential risk for infections that are associated with Lymphedema. 4/20/2017 ongoing 6/20/2017 goal met. 2. Patient will be independent with a personal lymphedema exercise program to assist with the lymphatic flow and reduce limb volume L UE by 200-250 mL.  4/20/2017  Goal unmet. Note reduction L UE volume of 87.43%, however, wrist and forearm measurements remain unchanged or elevated. 5/2/2017 limb volume reduced by 107.87, discrepancy at 12.43%, L UE>R UE.  6/20/2017 overall reduction by 145.5 mL. Discontinue goal.  3. Patient will understand the signs and symptoms of acute infection. 6/20/2017 goal met. Long term goals  Time frame: 4-8 weeks     1. Patient will have knowledge of the compression options and acquire a safe and   appropriate daytime and night time compression system to prevent    re-accumulation of fluid. 4/20/2017  Pt to bring compression garments which she has worn previously without success to next scheduled appt for therapist to assess fit and effectiveness. 5/2/2017 day/night time garments order. Pump ordered. Daytime compression garments fit. 6/20/2017 goal met, with day/night time compression garments and vasopneumatic pump obtained for patient, with pt doing well with home management of lymphedema using recommended tools. Pt now in [address] [id number] size I Max, regular length due to limb volume reduction, with replacement garment ordered for patient via [address] [place] [address], with pt to call and make payment.   2. Patient or family will be able to don/doff garments independently. Garment   system effectiveness will be evaluated prior to discharge for better long-term   management and outcomes. 5/2/2017  Daytime garments fit well, with pt able to don/doff with assist of spouse as needed. 6/20/2017  Pt/spouse able to don/doff garments indep at home. Goal met. 3. Improvement in LLIS by 4-6 points. 5/2/2017 goal met. 4. To transition patient to independent restorative phase of CDT. 6/20/2017 goal met       SUBJECTIVE REPORT:  Pt arrives for treatment today with compression sleeve/gauntlet in place, with night time compression garment also brought with patient for therapist to assess fit and effectiveness. Pt states she is continuing with daily use of vasopneumatic pump, and that she is pleased with the progress she has made with management of L UE lymphedema. Pain: no c/o lymphedema related pain. Gait:  indep  ADLs:  indep  Treatment Response:  Limited limb volume reduction noted. Forearm and wrist remain mildly dense and girth measurements remaining unchanged or elevated. Overall, reduction in fibrosis noted. Reviewed packet received at evaluation. Function: In compliance with CMSs Claims Based Outcome Reporting, the following G-code set was chosen for this patient based on their primary functional limitation being treated: The outcome measure chosen to determine the severity of the functional limitation was the LLIS with a score of 4/72 which was correlated with the impairment scale. ?  Other PT/OT Primary Functional Limitations:     - CURRENT STATUS: CI - 1%-19% impaired, limited or restricted    - GOAL STATUS: CJ - 20%-39% impaired, limited or restricted    - D/C STATUS:  CI - 1%-19% impaired, limited or restricted               TREATMENT AND OBJECTIVE DATA SUMMARY:       Therapeutic Exercise/Procedure: Deferred             Free exercises/ROM: Pt to continue all exercises at home as previously instructed within limits of pain with compression garments in place: shoulder abd at 90 paired with shoulder ER/elbow and wrist extension, to continue 3x/day with pt tolerating well in clinic. Cervical lateral flex/rotation R/L, scapular elevation/depression, protraction/retraction, arm swings, towel raises, paddling x 5 reps. Pt advised to continue 2x/day. Home program: Patient to perform daily to BID skin care, deep abdominal breathing, exercise routine. Rationale: Exercise will increase the lymph angiomotoricity and tissue pressure of the skin and thus decrease swelling. Manual Therapy: 11:10-11:24 am  Orthotic management:  11:25 am-12:05 pm 14 minutes  40 minutes   Treatment time:   Area to decongest: L UE   Vasopneumatic pump: Pt continuing daily use at home without c/o, stating night time has proven the most effective time for her to use device. Skin/wound care/debridement: Intact. Manual therapy: Limb volume measurements completed--overall reduction L UE by 145.5 mL, with limb volume discrepancy reduced to 8.71%, with most severe discrepancy being 21.44%. Goal for next visit would be to reduce to or under 5%. See scanned graph. Orthotic management: Therapist assessed fit and effectiveness of day/night time compression garments. Note both garments to be large at proximal aspect of extremity, with size reduction in daytime compression sleeve from size II Max 3511 Juzo, to size I Max 3511. Replacement sleeve ordered in appropriate size, with demo sleeve tried on in clinic to ensure proper fit of smaller size, which was successful. Elfego Palma assessed, with pt advised to consider alteration of garment including taking in garment proximal aspect of sleeve, with pt stating she would like to have need for alteration reassessed at next scheduled appt, which is 9/2017. Therapist in agreement with plan.   Pt to continue day/night time compression garment management as follows: Compression garment routine:  1. Apply daytime compression stocking to clean, dry extremity first thing in the morning, EVERY MORNING. 2. Wear garment until bedtime, adjusting throughout the day as needed should garment wrinkle or crease. Problem areas can be at joint, and top of garment, ensuring proximal aspect of garment is positioned appropriately on extremity. 3. Launder compression garments nightly either by hand or washing machine in a garment bag or pillowcase. Use mild detergent with NO FABRIC SOFTENER, NO BLEACH, NO WOOLITE. Wash in cool/warm water. 4. Dry garment in dryer on low heat right side out in garment bag or pillowcase. 5. Daytime compression garments are NOT safe to sleep in, so remove at bedtime. 6. Perform skin care to extremity, cleansing skin daily with soap and water, and using low pH lotion, upward strokes to stimulate lymphatic vessels. 7. Apply night time compression garment as instructed to wear while sleeping, adjusting throughout the night as needed for comfort. 8. Perform exercise program with daytime compression stockings on. Signs/Symptoms of infection include:  Fever, flu-like symptoms, pain/redness/warmth in extremity, sometimes with increase in swelling present. Stop wearing your compression garment if this occurs. Call your doctor immediately or go to the Emergency room to address potential infection. Remove compression with changes in circulation or numbness in extremity, different from what you may experience when not wearing compression garment, pain, unexplained shortness of breath. Pt to continue daily use of Entre vasopneumatic pump for 1 hour. TOTAL TREATMENT 55 mins     ASSESSMENT:   Treatment effectiveness and tolerance: Pt verbalizes understanding of appropriate don/doff of compression garments for daytime/night time wear, and daily use of pump.   Daytime garment ordered in size smaller as noted above based on patient's reduced limb volume/girth measurements. Pt agreeable to continuing HEP as instructed. Pt has received treatment for lymphedema prior to treatment in this clinic, with poor success in managing lymphedema with compression garment wear, elevation, and exercise. However, pt has responded well to treatment, including home management of lymphedema at this time. Pt will continue to require day/night time compression garment wear and daily use of vasopneumatic pump for successful home management of lymphedema. Progress toward goals: See updated goals above. PLAN OF CARE:   Changes to the plan of care: Pt to continue with home management phase II CDT, returning to clinic 9/2017 for re-evaluation of lymphedema, with pt to fit replacement daytime compression sleeve upon receiving and notifying clinic of any concerns or questions prior to next scheduled appt. Frequency: Discharge at this time.    Other: karel Alvarado, PT, CLT

## 2017-06-22 RX ORDER — CELECOXIB 200 MG/1
200 CAPSULE ORAL DAILY
Qty: 90 CAP | Refills: 1 | Status: SHIPPED | OUTPATIENT
Start: 2017-06-22 | End: 2017-06-22 | Stop reason: SDUPTHER

## 2017-06-22 RX ORDER — ZALEPLON 10 MG/1
10 CAPSULE ORAL
Qty: 90 CAP | Refills: 1 | Status: SHIPPED | OUTPATIENT
Start: 2017-06-22 | End: 2017-06-22 | Stop reason: SDUPTHER

## 2017-06-22 RX ORDER — CELECOXIB 200 MG/1
200 CAPSULE ORAL DAILY
Qty: 90 CAP | Refills: 1 | Status: SHIPPED | OUTPATIENT
Start: 2017-06-22 | End: 2017-11-21 | Stop reason: ALTCHOICE

## 2017-06-22 RX ORDER — GABAPENTIN 300 MG/1
300 CAPSULE ORAL 3 TIMES DAILY
Qty: 270 CAP | Refills: 1 | Status: SHIPPED | OUTPATIENT
Start: 2017-06-22 | End: 2018-02-07 | Stop reason: SDUPTHER

## 2017-06-22 RX ORDER — DULOXETIN HYDROCHLORIDE 60 MG/1
60 CAPSULE, DELAYED RELEASE ORAL DAILY
Qty: 90 CAP | Refills: 1 | Status: SHIPPED | OUTPATIENT
Start: 2017-06-22 | End: 2018-11-12 | Stop reason: SDUPTHER

## 2017-06-22 RX ORDER — OMEPRAZOLE 20 MG/1
20 CAPSULE, DELAYED RELEASE ORAL DAILY
Qty: 90 CAP | Refills: 1 | Status: SHIPPED | OUTPATIENT
Start: 2017-06-22 | End: 2017-06-22 | Stop reason: SDUPTHER

## 2017-06-22 RX ORDER — OMEPRAZOLE 20 MG/1
20 CAPSULE, DELAYED RELEASE ORAL DAILY
Qty: 90 CAP | Refills: 1 | Status: SHIPPED | OUTPATIENT
Start: 2017-06-22 | End: 2018-06-04 | Stop reason: SDUPTHER

## 2017-06-22 RX ORDER — GABAPENTIN 300 MG/1
300 CAPSULE ORAL 3 TIMES DAILY
Qty: 270 CAP | Refills: 1 | Status: SHIPPED | OUTPATIENT
Start: 2017-06-22 | End: 2017-06-22 | Stop reason: SDUPTHER

## 2017-06-22 RX ORDER — DULOXETIN HYDROCHLORIDE 60 MG/1
60 CAPSULE, DELAYED RELEASE ORAL DAILY
Qty: 90 CAP | Refills: 1 | Status: SHIPPED | OUTPATIENT
Start: 2017-06-22 | End: 2017-06-22 | Stop reason: SDUPTHER

## 2017-06-22 RX ORDER — ZALEPLON 10 MG/1
10 CAPSULE ORAL
Qty: 90 CAP | Refills: 1 | Status: SHIPPED | OUTPATIENT
Start: 2017-06-22 | End: 2018-06-04 | Stop reason: SDUPTHER

## 2017-06-22 NOTE — TELEPHONE ENCOUNTER
Patient request refill on all medication in hard copy form for a 90 day supple. Patient going out of town for 4 months. Patient request a confirmation call when ready for . Patient request a call back when complete. Patient Do Not want Rx going to local pharmacy  Patient best contact [phone] (M)

## 2017-11-21 ENCOUNTER — HOSPITAL ENCOUNTER (OUTPATIENT)
Dept: LAB | Age: 77
Discharge: HOME | End: 2017-11-21
Payer: MEDICARE

## 2017-11-21 ENCOUNTER — OFFICE VISIT (OUTPATIENT)
Dept: INTERNAL MEDICINE CLINIC | Age: 77
End: 2017-11-21

## 2017-11-21 VITALS
SYSTOLIC BLOOD PRESSURE: 104 MMHG | HEIGHT: 61 IN | TEMPERATURE: 98.3 F | WEIGHT: 138 LBS | OXYGEN SATURATION: 98 % | BODY MASS INDEX: 26.06 KG/M2 | DIASTOLIC BLOOD PRESSURE: 51 MMHG | RESPIRATION RATE: 16 BRPM | HEART RATE: 76 BPM

## 2017-11-21 DIAGNOSIS — M19.019 SHOULDER ARTHRITIS: ICD-10-CM

## 2017-11-21 DIAGNOSIS — E78.5 DYSLIPIDEMIA, GOAL LDL BELOW 100: ICD-10-CM

## 2017-11-21 DIAGNOSIS — K59.09 OTHER CONSTIPATION: Primary | ICD-10-CM

## 2017-11-21 PROCEDURE — 80053 COMPREHEN METABOLIC PANEL: CPT

## 2017-11-21 PROCEDURE — 84443 ASSAY THYROID STIM HORMONE: CPT

## 2017-11-21 PROCEDURE — 84439 ASSAY OF FREE THYROXINE: CPT

## 2017-11-21 PROCEDURE — 80061 LIPID PANEL: CPT

## 2017-11-21 PROCEDURE — 85025 COMPLETE CBC W/AUTO DIFF WBC: CPT

## 2017-11-21 PROCEDURE — 36415 COLL VENOUS BLD VENIPUNCTURE: CPT

## 2017-11-21 RX ORDER — BISACODYL 5 MG
15 TABLET, DELAYED RELEASE (ENTERIC COATED) ORAL DAILY PRN
COMMUNITY
End: 2018-11-12 | Stop reason: ALTCHOICE

## 2017-11-21 RX ORDER — MELOXICAM 15 MG/1
15 TABLET ORAL DAILY
Qty: 30 TAB | Refills: 5 | Status: SHIPPED | OUTPATIENT
Start: 2017-11-21 | End: 2019-02-16 | Stop reason: SDUPTHER

## 2017-11-21 NOTE — PROGRESS NOTES
HISTORY OF PRESENT ILLNESS  Helder Parker is a 78 y.o. female. HPI  Six month follow up for meds. She's been on Celebrex, but her insurance is no longer covering this. She does have arthritis and currently is complaining of pain in right shoulder, bothers her all the time, but worse when she is vacuuming or sweeping. No weakness or numbness in hand. She does have a history of some cervical disc disease. We are going to try switching to Meloxicam and we discussed watching for GI side effects, discussed creatinine monitoring. She's had mild dyslipidemia, cholesterol in the 220 range. Working on this with lifestyle. Will recheck levels. She continues to see her oncologist, who has been prescribing Gabapentin and Cymbalta. Review of Systems   Constitutional: Negative for chills, fever and malaise/fatigue. Musculoskeletal: Positive for joint pain and neck pain. Negative for falls. Neurological: Negative for sensory change and focal weakness. Physical Exam   Constitutional: She is oriented to person, place, and time. She appears well-developed and well-nourished. HENT:   Head: Normocephalic and atraumatic. Neck: Normal range of motion. Neck supple. Carotid bruit is not present. No thyromegaly present. Cardiovascular: Normal rate, regular rhythm, S1 normal, S2 normal, normal heart sounds and intact distal pulses. No murmur heard. Pulmonary/Chest: Effort normal and breath sounds normal. No respiratory distress. She has no wheezes. She has no rales. Musculoskeletal: Normal range of motion. She exhibits no edema. Sl dec strength left upper ext versus right but  intact and even bilaterally  No sensory loss in arm   Neurological: She is alert and oriented to person, place, and time. Skin: No rash noted. Psychiatric: She has a normal mood and affect. Her behavior is normal.   Nursing note and vitals reviewed. ASSESSMENT and PLAN  Diagnoses and all orders for this visit:    1.  Other constipation-managing with prune juice  -     CBC WITH AUTOMATED DIFF    2. Dyslipidemia, goal LDL below 524  -     METABOLIC PANEL, COMPREHENSIVE  -     TSH 3RD GENERATION  -     T4, FREE  -     LIPID PANEL    3. Shoulder arthritis-change from celebrex to mobic due to insurance coverage and watch gi side effects  Encouraged strength work for deltoids  Follow up if signs and symptoms worsen or change. After hours number given. appt in 6mo well  -     meloxicam (MOBIC) 15 mg tablet;  Take 1 Tab by mouth daily.  -     [facility]

## 2017-11-21 NOTE — MR AVS SNAPSHOT
Visit Information Date & Time Provider Department Dept. Phone Encounter #  
 11/21/2017  9:30 AM Esther Byrd MD [facility] [phone] [id number] Upcoming Health Maintenance Date Due DTaP/Tdap/Td series (1 - Tdap) 4/10/1963 ZOSTER VACCINE AGE 60> 2/10/2002 GLAUCOMA SCREENING Q2Y 4/10/2007 MEDICARE YEARLY EXAM 4/10/2007 Influenza Age 5 to Adult 8/1/2017 Pneumococcal 65+ High/Highest Risk (2 of 2 - PCV13) 5/9/2018 Allergies as of 11/21/2017  Review Complete On: 11/21/2017 By: Esther Byrd MD  
 No Known Allergies Current Immunizations  Never Reviewed Name Date Influenza High Dose Vaccine PF 9/27/2017 Pneumococcal Polysaccharide (PPSV-23) 5/9/2017 Not reviewed this visit You Were Diagnosed With   
  
 Codes Comments Other constipation    -  Primary ICD-10-CM: K59.09 
ICD-9-CM: 564.09 Dyslipidemia, goal LDL below 100     ICD-10-CM: E78.5 ICD-9-CM: 272.4 Shoulder arthritis     ICD-10-CM: M19.019 
ICD-9-CM: 716.91 Vitals BP Pulse Temp Resp Height(growth percentile) Weight(growth percentile) 104/51 (BP 1 Location: Left arm, BP Patient Position: Sitting) 76 98.3 °F (36.8 °C) (Oral) 16 5' 1\" (1.549 m) 138 lb (62.6 kg) SpO2 BMI OB Status Smoking Status 98% 26.07 kg/m2 Postmenopausal Former Smoker Vitals History BMI and BSA Data Body Mass Index Body Surface Area 26.07 kg/m 2 1.64 m 2 Preferred Pharmacy Pharmacy Name Phone [facility] [phone] Your Updated Medication List  
  
   
This list is accurate as of: 11/21/17  9:54 AM.  Always use your most recent med list.  
  
  
  
  
 DULCOLAX (BISACODYL) 5 mg EC tablet Generic drug:  bisacodyl Take 15 mg by mouth daily as needed for Constipation. DULoxetine 60 mg capsule Commonly known as:  CYMBALTA Take 1 Cap by mouth daily. gabapentin 300 mg capsule Commonly known as:  NEURONTIN Take 1 Cap by mouth three (3) times daily. meloxicam 15 mg tablet Commonly known as:  MOBIC Take 1 Tab by mouth daily. omeprazole 20 mg capsule Commonly known as:  PRILOSEC Take 1 Cap by mouth daily. zaleplon 10 mg capsule Commonly known as:  SONATA Take 1 Cap by mouth nightly. Max Daily Amount: 10 mg.  
  
  
  
  
Prescriptions Sent to Pharmacy Refills  
 meloxicam (MOBIC) 15 mg tablet 5 Sig: Take 1 Tab by mouth daily. Class: Normal  
 Pharmacy: [facility] [address] [place] [address] [address] [facility] 2017 [facility] #: [phone] Route: Oral  
  
We Performed the Following CBC WITH AUTOMATED DIFF [45086 CPT(R)] LIPID PANEL [77092 CPT(R)] METABOLIC PANEL, COMPREHENSIVE [42074 CPT(R)] REFERRAL TO PHYSICAL THERAPY [JBA13 Custom] T4, FREE [id number] CPT(R)] TSH 3RD GENERATION [45095 CPT(R)] Referral Information Referral ID Referred By Referred To  
  
 [id number] Jolynn CAIN [address]   
   [address], [place] Phone: [phone] Visits Status Start Date End Date 1 New Request 11/21/17 11/21/18 If your referral has a status of pending review or denied, additional information will be sent to support the outcome of this decision. Introducing [facility]! [employer] introduces [facility] [facility] patient portal. Now you can access parts of your medical record, email your doctor's office, and request medication refills online. 1. In your internet browser, go to [email or web address] [employer]ZAP 2. Click on the First Time User? Click Here link in the Sign In box. You will see the New Member Sign Up page. 3. Enter your [facility] Access Code exactly as it appears below. You will not need to use this code after youve completed the sign-up process.  If you do not sign up before the expiration date, you must request a new code. · [employer] Access Code: [email or web address] Expires: 2/19/2018  9:54 AM 
 
4. Enter the last four digits of your Social Security Number (xxxx) and Date of Birth (mm/dd/yyyy) as indicated and click Submit. You will be taken to the next sign-up page. 5. Create a [employer] ID. This will be your [employer] login ID and cannot be changed, so think of one that is secure and easy to remember. 6. Create a [employer] password. You can change your password at any time. 7. Enter your Password Reset Question and Answer. This can be used at a later time if you forget your password. 8. Enter your e-mail address. You will receive e-mail notification when new information is available in [address] 9. Click Sign Up. You can now view and download portions of your medical record. 10. Click the Download Summary menu link to download a portable copy of your medical information. If you have questions, please visit the Frequently Asked Questions section of the [employer] website. Remember, [employer] is NOT to be used for urgent needs. For medical emergencies, dial 911. Now available from your iPhone and Android! Please provide this summary of care documentation to your next provider. Your primary care clinician is listed as [place] [address]. If you have any questions after today's visit, please call [phone].

## 2017-11-22 LAB
ALBUMIN SERPL-MCNC: 4.6 G/DL (ref 3.5–4.8)
ALBUMIN/GLOB SERPL: 1.8 {RATIO} (ref 1.2–2.2)
ALP SERPL-CCNC: 67 IU/L (ref 39–117)
ALT SERPL-CCNC: 20 IU/L (ref 0–32)
AST SERPL-CCNC: 21 IU/L (ref 0–40)
BASOPHILS # BLD AUTO: 0 X10E3/UL (ref 0–0.2)
BASOPHILS NFR BLD AUTO: 1 %
BILIRUB SERPL-MCNC: 0.3 MG/DL (ref 0–1.2)
BUN SERPL-MCNC: 24 MG/DL (ref 8–27)
BUN/CREAT SERPL: 31 (ref 12–28)
CALCIUM SERPL-MCNC: 9.5 MG/DL (ref 8.7–10.3)
CHLORIDE SERPL-SCNC: 101 MMOL/L (ref 96–106)
CHOLEST SERPL-MCNC: 239 MG/DL (ref 100–199)
CO2 SERPL-SCNC: 25 MMOL/L (ref 18–29)
CREAT SERPL-MCNC: 0.78 MG/DL (ref 0.57–1)
EOSINOPHIL # BLD AUTO: 0.2 X10E3/UL (ref 0–0.4)
EOSINOPHIL NFR BLD AUTO: 4 %
ERYTHROCYTE [DISTWIDTH] IN BLOOD BY AUTOMATED COUNT: 13.7 % (ref 12.3–15.4)
GFR SERPLBLD CREATININE-BSD FMLA CKD-EPI: 75 ML/MIN/1.73
GFR SERPLBLD CREATININE-BSD FMLA CKD-EPI: 86 ML/MIN/1.73
GLOBULIN SER CALC-MCNC: 2.5 G/DL (ref 1.5–4.5)
GLUCOSE SERPL-MCNC: 105 MG/DL (ref 65–99)
HCT VFR BLD AUTO: 36.8 % (ref 34–46.6)
HDLC SERPL-MCNC: 74 MG/DL
HGB BLD-MCNC: 12.3 G/DL (ref 11.1–15.9)
IMM GRANULOCYTES # BLD: 0 X10E3/UL (ref 0–0.1)
IMM GRANULOCYTES NFR BLD: 0 %
INTERPRETATION, 910389: NORMAL
LDLC SERPL CALC-MCNC: 137 MG/DL (ref 0–99)
LYMPHOCYTES # BLD AUTO: 1.2 X10E3/UL (ref 0.7–3.1)
LYMPHOCYTES NFR BLD AUTO: 27 %
MCH RBC QN AUTO: 30.4 PG (ref 26.6–33)
MCHC RBC AUTO-ENTMCNC: 33.4 G/DL (ref 31.5–35.7)
MCV RBC AUTO: 91 FL (ref 79–97)
MONOCYTES # BLD AUTO: 0.5 X10E3/UL (ref 0.1–0.9)
MONOCYTES NFR BLD AUTO: 11 %
NEUTROPHILS # BLD AUTO: 2.6 X10E3/UL (ref 1.4–7)
NEUTROPHILS NFR BLD AUTO: 57 %
PLATELET # BLD AUTO: 272 X10E3/UL (ref 150–379)
POTASSIUM SERPL-SCNC: 4.7 MMOL/L (ref 3.5–5.2)
PROT SERPL-MCNC: 7.1 G/DL (ref 6–8.5)
RBC # BLD AUTO: 4.04 X10E6/UL (ref 3.77–5.28)
SODIUM SERPL-SCNC: 140 MMOL/L (ref 134–144)
T4 FREE SERPL-MCNC: 1.27 NG/DL (ref 0.82–1.77)
TRIGL SERPL-MCNC: 141 MG/DL (ref 0–149)
TSH SERPL DL<=0.005 MIU/L-ACNC: 0.35 UIU/ML (ref 0.45–4.5)
VLDLC SERPL CALC-MCNC: 28 MG/DL (ref 5–40)
WBC # BLD AUTO: 4.6 X10E3/UL (ref 3.4–10.8)

## 2018-01-24 ENCOUNTER — HOSPITAL ENCOUNTER (OUTPATIENT)
Dept: PHYSICAL THERAPY | Age: 78
Discharge: HOME | End: 2018-01-24
Payer: MEDICARE

## 2018-01-24 VITALS
WEIGHT: 135 LBS | SYSTOLIC BLOOD PRESSURE: 102 MMHG | HEIGHT: 61 IN | DIASTOLIC BLOOD PRESSURE: 64 MMHG | HEART RATE: 81 BPM | BODY MASS INDEX: 25.49 KG/M2 | OXYGEN SATURATION: 99 %

## 2018-01-24 PROCEDURE — 97161 PT EVAL LOW COMPLEX 20 MIN: CPT

## 2018-01-24 PROCEDURE — G8991 OTHER PT/OT GOAL STATUS: HCPCS

## 2018-01-24 PROCEDURE — G8990 OTHER PT/OT CURRENT STATUS: HCPCS

## 2018-01-24 PROCEDURE — 97140 MANUAL THERAPY 1/> REGIONS: CPT

## 2018-01-24 NOTE — PROGRESS NOTES
[address] [facility]  [address] [facility]  [place], [address]      INITIAL EVALUATION    NAME: Katalina Keene AGE: 78 y.o. GENDER: female  DATE: 1/24/2018  REFERRING PHYSICIAN: Luis Alfredo Jarvis MD  HISTORY AND BACKGROUND: Patient is a 77 y/o breast cancer survivor with initial diagnosis 12/2013, IDC, T1cN1, triple negative. Per oncology note, pt underwent neoadjuvant chemotherapy, with 1.9 cm tumor, 2+/14 lymph nodes ALND, grade 3. Pt reported early onset of lymphedema L UE when seen for evaluation in this clinic 3/29/2017, stating she had not had consistent management of condition, with progression of swelling/skin and tissue changes significant over the past 2-3 years. Patient returns today for assessment of increased size L UE. Patient states she has been busy painting, however, has been consistent with wear of day/night time compression garments, daily use of pump, 2 hours/day, and continues to note L UE not decreasing in size. Pt arrives with bandaging supplies today in the even that compression bandaging is implemented. See further medical history as noted below. Primary Diagnosis:  · L UE lymphedema, secondary (I89.0)  Other Treatment Diagnoses:   Swelling not relieved by elevation (R60.0 localized edema)   Lack of coordination (Ataxic R27.0; other lack of coordination R27.8; unspecified lack of coordination R27.9)   Malignant neoplasm of breast (C50.919)  Morbid Obesity (E66.01)  Date of Onset: 12/2013; progression over the past 2 years. Present Symptoms and Functional Limitations: L UE swelling, limiting appropriate fit of shirts/blouses, unable to wear wedding rings, pain/tightness axillary region. Was managing well with compression garment wear day/night, daily use of pump, however, experiencing exacerbation of condition despite consistency with home management.   Pt referred back to clinic for evaluation and treatment as indicated. Lymphedema Life Impact Scale: 8/72; CI; 12%. Past Medical History:   Past Medical History:   Diagnosis Date    Arthritis     Breast cancer ([facility] [address] 2015    Left    Neurological disorder     chemotherapy induced neuropathy    Radiation therapy complication 7958    left breast ca     Past Surgical History:   Procedure Laterality Date    HX BREAST BIOPSY Left yrs    neg; stereotactic    HX BREAST LUMPECTOMY Left 2015    HX BREAST REDUCTION Bilateral yrs ago    HX GYN      Hysterectomy     Current Medications:    Per MD note/patient report:  Duloxetine 60 mg daily; Gabapentin 600 mg 2x/day; lorazepam 1 mg prn; omeprazole 20 mg daily, zolpidem 10 mg at bedtime; Celecoxib 200 mg daily; biotin; Aminophylline. No current facility-administered medications for this encounter. 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 Pt reports lymphedema onset after lumpectomy/ALND. Was seen at this clinic for CDT with good results, however, experiencing exacerbation of condition. Living Situation: lives with spouse, one daughter who lives locally, who is a nurse. Trainable Caregiver?: yes, spouse. Self-care/ADLs: indep     Mobility: indep   Sleeping Arrangement:  bed   Adaptive Equipment Owned: none  Previous Therapy:  Seen at this clinic on 3/29/2017 for initial evaluation, with patient completing phase I CDT, with good response. Pt fit with appropriate day/night time compression garments, with vasopneumatic pump obtained for home use. Compression/Lymphedema Equipment: day/night time compression garments, medical grade. Vasopneumatic pump for home use. SUBJECTIVE:   \"My arm is swelling and I am doing everything I'm supposed to do\".   Pt states she has been more active, has been painting, to which she attributes increase in size of L UE. Patients goals for therapy: decrease R UE limb size and make changes to home program as needed for effective home management of lymphedema. OBJECTIVE DATA SUMMARY:   EXAMINATION/PRESENTATION/DECISION MAKING:   Pain:  Pain Scale 1: Numeric (0 - 10)  Pain Intensity 1: 1  Pain Location 1: Arm    Skin and Tissue Assessment:  Dermal Status:  ( x)  Intact ( )  Dry   ( )  Tenuous ( )  Flaky   ( )  Wound/lesion present (x )  Scars: breast incisions well healed, no increase in tension; axillary incision adherent, moderate tension. Note mild cording with skin traction axilla to proximal upper arm. ( )  Dermatitis    Texture/Consistency:  ( x)  Boggy ( )  Pitting Edema   ( )  Brawny ( )  Combination   (x )  Fibrotic/Woody: forearm    Pigmentation/Color Change:  ( )  Normal ( )  Hemosiderin   ( )  Red ( )  Erythematous   ( x)  Hyperpigmented: mild L UE ( )  Hyperlipodermatosclerosis   Anomalies: na  ( )  Lymphorrhea ( )  Vesicles   ( )  Petechiae ( )  Warty Vercusis   ( )  Bullae ( )  Papilloma   Circulatory:  ( )  MALINA ( )  Varicosities:   (x )  Pulses: radial palpable bilateral UE ( )  Vascular studies ruled out DVT in past   ( )  DVT History    Nails:  (x )  Normal  ( )  Fungus  Stemmers Sign: positive L, mild    Height:  Height: 5' 1\" (154.9 cm)  Weight:  Weight: 61.2 kg (135 lb)   BMI:  BMI (calculated): 25.5  (36 or greater: adversely affecting lymphedema)  Volumetric Measurements:   Right:  1692.08 mL; 3/29/2017--1759.71 mL Left: 1970.72 mL; 3/29/2017-- 2086.37mL   % Difference: 16.47%; 3/29/2017--18.56%; reduced to 8.71% 6/20/2017 Dominance: R UE   (See scanned graph)  Range of Motion: bilateral UE WFL all joints, all motions. Strength: bilateral UE grossly 5/5 with MMT. Sensation:  Decreased distal fingers, neuropathic pain bilateral LE, feet/mid lower leg.   Mobility:  Bed/Chair Mobility:  indep Transfers:  indep   Sitting Balance:  good Standing Balance:  good   Gait:  indep without device. Wheelchair Mobility:  na   Endurance:  Intact for gait community distances, normal daily activities, travel. Stairs:  Not assessed. Safety:  Patient is alert and oriented:  x4   Safety awareness:  intact   Fall Risk?:  Low, neuropathy   Patient given written fall prevention handout: Yes   Precautions:  Standard lymphedema precautions to include avoiding blood pressure readings, injections and IVs or other procedures/acts that could lead to broken skin on affected area, and avoiding excessive heat, resistive activity or altitude without compression garment    Functional Measure:   LLIS  In compliance with CMSs Claims Based Outcome Reporting, the following G-code set was chosen for this patient based on their primary functional limitation being treated: The outcome measure chosen to determine the severity of the functional limitation was the LLIS with a score of 8/72 which was correlated with the impairment scale. ? Other PT/OT Primary Functional Limitations:     - CURRENT STATUS: CI - 1%-19% impaired, limited or restricted    - GOAL STATUS: CH - 0% impaired, limited or restricted    - D/C STATUS:  ---------------To be determined---------------                    Evaluation Time: 9:44-10:02 am  18 minutes    TREATMENT PROVIDED:   1. Treatment description:  The patient was educated regarding the role and function of the lymphatic system, and instructed in the lymphedema management protocol of complete decongestive therapy (CDT). This includes skin care to prevent breakdown or infection, lymphedema exercises, custom compression therapy options (bandaging, compression garments, compression pump, Easter Ink, JoViPak, [employer], etc. as needed), and decongestion with manual lymphatic drainage as indicated. We discussed the need for consistent compression system for lymphedema management.   Diaphragmatic breathing instruction and skin care education was performed,applying low pH lotion to extremity using upward strokes to stimulate lymphatic vessels. MLD was performed as follows:  Stim R axillary/L inguinal nodes; activated AAA/CLAUDIO/L AI anastomoses. Stim parasternal/intercostal precollectors. Full UE sequence L UE, including medial to lateral upper arm technique. Compression bandaging application L UE:  Stockinette  Finger wraps  Cast padding/biafleece  Short stretch 6, 8 cm x 3    Pt instructed in home bandaging management as follows:  Compression bandaging instructions:  1. Compression bandaging will be applied twice a week by therapist in clinic, with adjustments to be made at home as indicated if bandaging becomes loose or uncomfortable. 2. If tolerated, remain bandaged between appointments with therapist, removing under the following conditions--DO NOT WAIT FOR A RETURN PHONE CALL FROM CLINIC:    -Numbness/tingling in extremity different from what you have experienced without the bandages in place.  -Compromise in circulation, monitoring blood refill into toes after applying gentle pressure to toes.  -Onset of pain in extremity that is sudden and severe in nature. -Redness, warmth in limb, and/or fever, flu-like symptoms, which may indicate infection. If this occurs, call your doctor right away. If you note a sudden increase in swelling in extremity, with or without redness/warmth, go to the emergency room as soon as possible to have blood clot ruled out. 3. Compression bandaging supplies that can be laundered are the brown compression wraps and any foam applied for padding. Launder in washer/dryer with NO fabric softener, bleach, woolite. Dry on a low heat. 4. Once compression garments are ordered, compression bandaging will be continued until garments arrive for fitting. This process can take several weeks.   Pt advised that compression bandaging can be worn with air travel on 1/26/2018, removing after arriving to destination, returning to wear of day/night time compression garments. Pt advised that she can return with wear of garments, or spouse can reapply bandaging prior to air travel home, as spouse has been instructed in bandaging application with good understanding. Patient also able to assist spouse. Pt advised that pump can be used for 2 hours, however, not consecutively. Advised to use for 1 hour in the morning, 1 hour in the afternoon when out of bandaging. Pt verbalizes good understanding of home management of bandaging/pump/compression garment wear. Treatment time:  10:03-11:01 am  Minutes: 58 minutes    Manual therapy 4        ASSESSMENT:   Otto Kilpatrick is a 78 y.o. female who presents with stage II moderate lymphedema, L UE, breast cancer related, exacerbation despite patient reporting consistent home management of lymphedema as noted above. Lymphedema is long-standing, however, has responded well to treatment in the past.  Daytime compression garments to be assessed at follow up visit, as they may be in need of replacement, and no longer effectively managing lymphedema during daytime hours. Note mild hand involvement, persists, with pt no longer able to wear wedding rings on that hand. Pt agreeable to proceeding with treatment in clinic today, including compression bandaging, MLD, stating spouse will be agreeable to assist her with bandaging. Patient will benefit from complete decongestive therapy (CDT) including manual lymphatic drainage (MLD) technique, short-stretch textile bandages/compression system to decongest limb, and kinesiotaping as appropriate. Patient will receive instruction in proper skin care to recognize signs/symptoms of and prevent infection, therapeutic exercise, and self-MLD for independent home program and restorative lymphatic performance. This care is medically necessary due to the infection risk with lymphedema, and to improve functional activities.   CDT is necessary to resolve swelling to allow patient to return to wearing normal clothes/footwear, and prevent worsening of symptoms, such as venous stasis ulcerations, infections, or hospitalizations. Patient will be independent with home program strategies to allow improved ADL ability and mobility and to allow patient to return to greatest functional independence. Rehabilitation potential is considered to be Good, based on response to prior treatment. Factors which may influence rehabilitation potential include neuropathy UE/LE, long-standing lymphedema, with recent increased severity, II with hand involvement noted. Patient will benefit from 1-2x/week physical therapy visits over 4-8 weeks to optimize improvement in these areas. PLAN OF CARE:   Recommendations and Planned Interventions:  Manual lymph drainage/complete decongestive therapy  Multi-layer compression bandaging (short-stretch)  Compression garment fitting/provision  Lymphedema therapeutic exercise  AROM/PROM/Strength/Coordination  Self-care training  Functional mobility training  Education in skin care and lymphedema precautions  Self-MLD education per home program  Self-bandaging education per home program  Caregiver education as needed  Wound care as needed     GOALS  Short term goals  Time frame: 2-4 weeks  1. Instruct the patient to be independent with proper skin care to prevent future skin breakdown and decrease the potential risk for infections that are associated with Lymphedema. 2. Patient will be independent with a personal lymphedema exercise program to assist with the lymphatic flow and reduce limb volume L UE by 100-150 mL. 3. Patient will understand the signs and symptoms of acute infection. Long term goals  Time frame: 4-8 weeks    1. Patient will have knowledge of the compression options and acquire a safe and   appropriate daytime and night time compression system to prevent    re-accumulation of fluid.   2.  Patient or family will be able to don/doff garments independently. Garment   system effectiveness will be evaluated prior to discharge for better long-term   management and outcomes. 3.  Improvement in LLIS by 2-4 points. 4.   To transition patient to independent restorative phase of CDT. Patient has participated in goal setting and agrees to work toward plan of care. Patient was instructed to call if questions or concerns arise. Thank you for this referral.  Al Messer, PT, CLT   Time Calculation: 77 mins    TREATMENT PLAN EFFECTIVE DATES:   1/24/2018 to 4/15/2018  I have read the above plan of care for Andreea Caldwell. I certify the above prescribed services are required by this patient and are medically necessary.   The above plan of care has been developed in conjunction with the lymphedema/physical therapist.       Physician Signature: ____________________________________Date:______________

## 2018-01-25 ENCOUNTER — TELEPHONE (OUTPATIENT)
Dept: INTERNAL MEDICINE CLINIC | Age: 78
End: 2018-01-25

## 2018-01-25 NOTE — TELEPHONE ENCOUNTER
Pt called the on call doc last night with \"some constipation and pain below R rib cage\". Pt states she woke up feeling very uncomfortable and in pain. Pt states she was advised to Call the office if this gets worse . Pt is hoping to see PCP today.       Pt can be reached at  [phone]

## 2018-01-25 NOTE — TELEPHONE ENCOUNTER
Patient c/o right-sided rib pain that extends to her back when she takes a deep breath. She is not wheezing or coughing, no fever or chills, denies N/V & abdominal pain, denies chest pain. She questions if she has an appendicitis. I advised based off her symptoms it does not sound like she is having an appendicitis, sounds more lung related and she may need a chest x-ray and lungs checked. Advised patient since not in severe pain advised she go to an urgent care center  such as [facility] instead of the ER for evaluation. Patient agreed to plan.

## 2018-01-31 ENCOUNTER — HOSPITAL ENCOUNTER (OUTPATIENT)
Dept: CT IMAGING | Age: 78
Discharge: HOME | End: 2018-01-31
Attending: NURSE PRACTITIONER
Payer: MEDICARE

## 2018-01-31 ENCOUNTER — OFFICE VISIT (OUTPATIENT)
Dept: INTERNAL MEDICINE CLINIC | Age: 78
End: 2018-01-31

## 2018-01-31 VITALS
DIASTOLIC BLOOD PRESSURE: 88 MMHG | BODY MASS INDEX: 25.75 KG/M2 | RESPIRATION RATE: 12 BRPM | OXYGEN SATURATION: 98 % | HEART RATE: 97 BPM | TEMPERATURE: 98.4 F | HEIGHT: 61 IN | SYSTOLIC BLOOD PRESSURE: 138 MMHG | WEIGHT: 136.4 LBS

## 2018-01-31 DIAGNOSIS — R10.31 RIGHT LOWER QUADRANT ABDOMINAL PAIN: ICD-10-CM

## 2018-01-31 DIAGNOSIS — R10.9 ACUTE RIGHT FLANK PAIN: ICD-10-CM

## 2018-01-31 DIAGNOSIS — M54.5 LOW BACK PAIN, UNSPECIFIED BACK PAIN LATERALITY, UNSPECIFIED CHRONICITY, WITH SCIATICA PRESENCE UNSPECIFIED: Primary | ICD-10-CM

## 2018-01-31 LAB
BILIRUB UR QL STRIP: NEGATIVE
GLUCOSE UR-MCNC: NEGATIVE MG/DL
KETONES P FAST UR STRIP-MCNC: NEGATIVE MG/DL
PH UR STRIP: 5.5 [PH] (ref 4.6–8)
PROT UR QL STRIP: NEGATIVE
SP GR UR STRIP: 1.01 (ref 1–1.03)
UA UROBILINOGEN AMB POC: NORMAL (ref 0.2–1)
URINALYSIS CLARITY POC: CLEAR
URINALYSIS COLOR POC: YELLOW
URINE BLOOD POC: NEGATIVE
URINE LEUKOCYTES POC: NEGATIVE
URINE NITRITES POC: NEGATIVE

## 2018-01-31 PROCEDURE — 74177 CT ABD & PELVIS W/CONTRAST: CPT

## 2018-01-31 PROCEDURE — 74011636320 HC RX REV CODE- 636/320: Performed by: RADIOLOGY

## 2018-01-31 RX ADMIN — IOPAMIDOL 98 ML: 755 INJECTION, SOLUTION INTRAVENOUS at 11:35

## 2018-01-31 NOTE — PATIENT INSTRUCTIONS
Flank Pain: Care Instructions  Your Care Instructions  Flank pain is pain on the side of the back just below the rib cage and above the waist. It can be on one or both sides. Flank pain has many possible causes, including a kidney stone, a urinary tract infection, or back strain. Flank pain may get better on its own. But don't ignore new symptoms, such as fever, nausea and vomiting, urination problems, pain that gets worse, and dizziness. These may be signs of a more serious problem. You may have to have tests or other treatment. Follow-up care is a key part of your treatment and safety. Be sure to make and go to all appointments, and call your doctor if you are having problems. It's also a good idea to know your test results and keep a list of the medicines you take. How can you care for yourself at home? · Rest until you feel better. · Take pain medicines exactly as directed. ¨ If the doctor gave you a prescription medicine for pain, take it as prescribed. ¨ If you are not taking a prescription pain medicine, ask your doctor if you can take an over-the-counter pain medicine, such as acetaminophen (Tylenol), ibuprofen (Advil, Motrin), or naproxen (Aleve). Read and follow all instructions on the label. · If your doctor prescribed antibiotics, take them as directed. Do not stop taking them just because you feel better. You need to take the full course of antibiotics. · To apply heat, put a warm water bottle, a heating pad set on low, or a warm cloth on the painful area. Do not go to sleep with a heating pad on your skin. · To prevent dehydration, drink plenty of fluids, enough so that your urine is light yellow or clear like water. Choose water and other caffeine-free clear liquids until you feel better. If you have kidney, heart, or liver disease and have to limit fluids, talk with your doctor before you increase the amount of fluids you drink. When should you call for help?   Call your doctor now or seek immediate medical care if:  ? · Your flank pain gets worse. ? · You have new symptoms, such as fever, nausea, or vomiting. ? · You have symptoms of a urinary problem. For example:  ¨ You have blood or pus in your urine. ¨ You have chills or body aches. ¨ It hurts to urinate. ¨ You have groin or belly pain. ? Watch closely for changes in your health, and be sure to contact your doctor if you do not get better as expected. Where can you learn more? Go to http://jorge-claritza.info/. Enter S191 in the search box to learn more about \"Flank Pain: Care Instructions. \"  Current as of: March 20, 2017  Content Version: 11.4  © 3054-1198 elastic.io. Care instructions adapted under license by [employer] (which disclaims liability or warranty for this information). If you have questions about a medical condition or this instruction, always ask your healthcare professional. [place] [address] any warranty or liability for your use of this information. Abdominal Pain: Care Instructions  Your Care Instructions    Abdominal pain has many possible causes. Some aren't serious and get better on their own in a few days. Others need more testing and treatment. If your pain continues or gets worse, you need to be rechecked and may need more tests to find out what is wrong. You may need surgery to correct the problem. Don't ignore new symptoms, such as fever, nausea and vomiting, urination problems, pain that gets worse, and dizziness. These may be signs of a more serious problem. Your doctor may have recommended a follow-up visit in the next 8 to 12 hours. If you are not getting better, you may need more tests or treatment. The doctor has checked you carefully, but problems can develop later. If you notice any problems or new symptoms, get medical treatment right away. Follow-up care is a key part of your treatment and safety.  Be sure to make and go to all appointments, and call your doctor if you are having problems. It's also a good idea to know your test results and keep a list of the medicines you take. How can you care for yourself at home? · Rest until you feel better. · To prevent dehydration, drink plenty of fluids, enough so that your urine is light yellow or clear like water. Choose water and other caffeine-free clear liquids until you feel better. If you have kidney, heart, or liver disease and have to limit fluids, talk with your doctor before you increase the amount of fluids you drink. · If your stomach is upset, eat mild foods, such as rice, dry toast or crackers, bananas, and applesauce. Try eating several small meals instead of two or three large ones. · Wait until 48 hours after all symptoms have gone away before you have spicy foods, alcohol, and drinks that contain caffeine. · Do not eat foods that are high in fat. · Avoid anti-inflammatory medicines such as aspirin, ibuprofen (Advil, Motrin), and naproxen (Aleve). These can cause stomach upset. Talk to your doctor if you take daily aspirin for another health problem. When should you call for help? Call 911 anytime you think you may need emergency care. For example, call if:  ? · You passed out (lost consciousness). ? · You pass maroon or very bloody stools. ? · You vomit blood or what looks like coffee grounds. ? · You have new, severe belly pain. ?Call your doctor now or seek immediate medical care if:  ? · Your pain gets worse, especially if it becomes focused in one area of your belly. ? · You have a new or higher fever. ? · Your stools are black and look like tar, or they have streaks of blood. ? · You have unexpected vaginal bleeding. ? · You have symptoms of a urinary tract infection. These may include:  ¨ Pain when you urinate. ¨ Urinating more often than usual.  ¨ Blood in your urine. ? · You are dizzy or lightheaded, or you feel like you may faint. ? Watch closely for changes in your health, and be sure to contact your doctor if:  ? · You are not getting better after 1 day (24 hours). Where can you learn more? Go to http://jorge-claritza.info/. Enter I126 in the search box to learn more about \"Abdominal Pain: Care Instructions. \"  Current as of: March 20, 2017  Content Version: 11.4  © 3836-2669 [employer]. Care instructions adapted under license by [employer] (which disclaims liability or warranty for this information). If you have questions about a medical condition or this instruction, always ask your healthcare professional. [address] any warranty or liability for your use of this information.

## 2018-01-31 NOTE — MR AVS SNAPSHOT
Elisabeth Pulse 
 
 
 [address] [place] [address] [facility] 
[phone] Patient: Lisa Luque MRN: [id number] Visit Information Date & Time Provider Department Dept. Phone Encounter #  
 1/31/2018  8:40 AM Janene Guerrero NP [facility] [phone] [id number] Your Appointments 3/5/2018 10:15 AM  
ROUTINE CARE with Brandy Bay MD  
[facility]) Appt Note: had message to call and make a follow up appointment with Dr Chaitanya Nguyen [address] [facility] [address]  
  
   
 [phone]  
  
    
 5/21/2018 11:30 AM  
ROUTINE CARE with Brandy Bay MD  
[facility]) Appt Note: 6 mo  
 [address] [facility]  
[phone] Upcoming Health Maintenance Date Due DTaP/Tdap/Td series (1 - Tdap) 4/10/1963 ZOSTER VACCINE AGE 60> 2/10/2002 GLAUCOMA SCREENING Q2Y 4/10/2007 MEDICARE YEARLY EXAM 4/10/2007 Pneumococcal 65+ High/Highest Risk (2 of 2 - PCV13) 5/9/2018 Allergies as of 1/31/2018  Review Complete On: 1/31/2018 By: Janene Guerrero NP No Known Allergies Current Immunizations  Never Reviewed Name Date Influenza High Dose Vaccine PF 9/27/2017 Pneumococcal Polysaccharide (PPSV-23) 5/9/2017 Not reviewed this visit You Were Diagnosed With   
  
 Codes Comments Low back pain, unspecified back pain laterality, unspecified chronicity, with sciatica presence unspecified    -  Primary ICD-10-CM: M54.5 ICD-9-CM: 724.2 Acute right flank pain     ICD-10-CM: R10.9 ICD-9-CM: 789.09, 338.19 Right lower quadrant abdominal pain     ICD-10-CM: R10.31 ICD-9-CM: 789.03 Vitals BP Pulse Temp Resp Height(growth percentile) Weight(growth percentile) [facility] ) 149/94 (BP 1 Location: Right arm, BP Patient Position: Sitting) 97 98.4 °F (36.9 °C) (Oral) 12 5' 1\" (1.549 m) 136 lb 6.4 oz (61.9 kg) SpO2 BMI OB Status Smoking Status 98% 25.77 kg/m2 Postmenopausal Former Smoker BMI and BSA Data Body Mass Index Body Surface Area 25.77 kg/m 2 1.63 m 2 Preferred Pharmacy Pharmacy Name Phone [facility] [address] [place] [address] [place] [phone] Your Updated Medication List  
  
   
This list is accurate as of: 1/31/18  9:00 AM.  Always use your most recent med list.  
  
  
  
  
 DULCOLAX (BISACODYL) 5 mg EC tablet Generic drug:  bisacodyl Take 15 mg by mouth daily as needed for Constipation. DULoxetine 60 mg capsule Commonly known as:  CYMBALTA Take 1 Cap by mouth daily. gabapentin 300 mg capsule Commonly known as:  NEURONTIN Take 1 Cap by mouth three (3) times daily. meloxicam 15 mg tablet Commonly known as:  MOBIC Take 1 Tab by mouth daily. omeprazole 20 mg capsule Commonly known as:  PRILOSEC Take 1 Cap by mouth daily. zaleplon 10 mg capsule Commonly known as:  SONATA Take 1 Cap by mouth nightly. Max Daily Amount: 10 mg. We Performed the Following AMB POC URINALYSIS DIP STICK AUTO W/O MICRO [55461 CPT(R)] To-Do List   
 01/31/2018 Imaging:  CT ABD PELV W CONT   
  
 02/06/2018 8:30 AM  
  Appointment with Sherry Paniagua at [address]  ([phone]) 02/09/2018 8:30 AM  
  Appointment with Sherry Paniagua at [address] ([phone])  
  
 02/15/2018 12:30 PM  
  Appointment with Sherry Paniagua at [address] ([phone]) Patient Instructions Flank Pain: Care Instructions Your Care Instructions Flank pain is pain on the side of the back just below the rib cage and above the waist. It can be on one or both sides.  Flank pain has many possible causes, including a kidney stone, a urinary tract infection, or back strain. Flank pain may get better on its own. But don't ignore new symptoms, such as fever, nausea and vomiting, urination problems, pain that gets worse, and dizziness. These may be signs of a more serious problem. You may have to have tests or other treatment. Follow-up care is a key part of your treatment and safety. Be sure to make and go to all appointments, and call your doctor if you are having problems. It's also a good idea to know your test results and keep a list of the medicines you take. How can you care for yourself at home? · Rest until you feel better. · Take pain medicines exactly as directed. ¨ If the doctor gave you a prescription medicine for pain, take it as prescribed. ¨ If you are not taking a prescription pain medicine, ask your doctor if you can take an over-the-counter pain medicine, such as acetaminophen (Tylenol), ibuprofen (Advil, Motrin), or naproxen (Aleve). Read and follow all instructions on the label. · If your doctor prescribed antibiotics, take them as directed. Do not stop taking them just because you feel better. You need to take the full course of antibiotics. · To apply heat, put a warm water bottle, a heating pad set on low, or a warm cloth on the painful area. Do not go to sleep with a heating pad on your skin. · To prevent dehydration, drink plenty of fluids, enough so that your urine is light yellow or clear like water. Choose water and other caffeine-free clear liquids until you feel better. If you have kidney, heart, or liver disease and have to limit fluids, talk with your doctor before you increase the amount of fluids you drink. When should you call for help? Call your doctor now or seek immediate medical care if: 
? · Your flank pain gets worse. ? · You have new symptoms, such as fever, nausea, or vomiting. ? · You have symptoms of a urinary problem. For example: ¨ You have blood or pus in your urine. ¨ You have chills or body aches. ¨ It hurts to urinate. ¨ You have groin or belly pain. ? Watch closely for changes in your health, and be sure to contact your doctor if you do not get better as expected. Where can you learn more? Go to http://jorge-claritza.info/. Enter S191 in the search box to learn more about \"Flank Pain: Care Instructions. \" Current as of: March 20, 2017 Content Version: 11.4 © 1367-8072 [employer]. Care instructions adapted under license by [employer] (which disclaims liability or warranty for this information). If you have questions about a medical condition or this instruction, always ask your healthcare professional. [address] any warranty or liability for your use of this information. Abdominal Pain: Care Instructions Your Care Instructions Abdominal pain has many possible causes. Some aren't serious and get better on their own in a few days. Others need more testing and treatment. If your pain continues or gets worse, you need to be rechecked and may need more tests to find out what is wrong. You may need surgery to correct the problem. Don't ignore new symptoms, such as fever, nausea and vomiting, urination problems, pain that gets worse, and dizziness. These may be signs of a more serious problem. Your doctor may have recommended a follow-up visit in the next 8 to 12 hours. If you are not getting better, you may need more tests or treatment. The doctor has checked you carefully, but problems can develop later. If you notice any problems or new symptoms, get medical treatment right away. Follow-up care is a key part of your treatment and safety. Be sure to make and go to all appointments, and call your doctor if you are having problems. It's also a good idea to know your test results and keep a list of the medicines you take. How can you care for yourself at home? · Rest until you feel better. · To prevent dehydration, drink plenty of fluids, enough so that your urine is light yellow or clear like water. Choose water and other caffeine-free clear liquids until you feel better. If you have kidney, heart, or liver disease and have to limit fluids, talk with your doctor before you increase the amount of fluids you drink. · If your stomach is upset, eat mild foods, such as rice, dry toast or crackers, bananas, and applesauce. Try eating several small meals instead of two or three large ones. · Wait until 48 hours after all symptoms have gone away before you have spicy foods, alcohol, and drinks that contain caffeine. · Do not eat foods that are high in fat. · Avoid anti-inflammatory medicines such as aspirin, ibuprofen (Advil, Motrin), and naproxen (Aleve). These can cause stomach upset. Talk to your doctor if you take daily aspirin for another health problem. When should you call for help? Call 911 anytime you think you may need emergency care. For example, call if: 
? · You passed out (lost consciousness). ? · You pass maroon or very bloody stools. ? · You vomit blood or what looks like coffee grounds. ? · You have new, severe belly pain. ?Call your doctor now or seek immediate medical care if: 
? · Your pain gets worse, especially if it becomes focused in one area of your belly. ? · You have a new or higher fever. ? · Your stools are black and look like tar, or they have streaks of blood. ? · You have unexpected vaginal bleeding. ? · You have symptoms of a urinary tract infection. These may include: 
¨ Pain when you urinate. ¨ Urinating more often than usual. 
¨ Blood in your urine. ? · You are dizzy or lightheaded, or you feel like you may faint. ? Watch closely for changes in your health, and be sure to contact your doctor if: 
? · You are not getting better after 1 day (24 hours). Where can you learn more? Go to http://jorge-claritza.info/. Enter P056 in the search box to learn more about \"Abdominal Pain: Care Instructions. \" Current as of: March 20, 2017 Content Version: 11.4 © 3699-4768 Healthwise, [employer]orporated. Care instructions adapted under license by [employer] (which disclaims liability or warranty for this information). If you have questions about a medical condition or this instruction, always ask your healthcare professional. [address] any warranty or liability for your use of this information. Introducing [facility]! Ofelia Massey introduces The [employer] patient portal. Now you can access parts of your medical record, email your doctor's office, and request medication refills online. 1. In your internet browser, go to [email or web address] 2. Click on the First Time User? Click Here link in the Sign In box. You will see the New Member Sign Up page. 3. Enter your The Posh[employer] Access Code exactly as it appears below. You will not need to use this code after youve completed the sign-up process. If you do not sign up before the expiration date, you must request a new code. · The Posh[employer]er Access Code: [email or web address] Expires: 2/19/2018  9:54 AM 
 
4. Enter the last four digits of your Social Security Number (xxxx) and Date of Birth (mm/dd/yyyy) as indicated and click Submit. You will be taken to the next sign-up page. 5. Create a The Posh[employer] ID. This will be your [employer] login ID and cannot be changed, so think of one that is secure and easy to remember. 6. Create a [employer] password. You can change your password at any time. 7. Enter your Password Reset Question and Answer. This can be used at a later time if you forget your password. 8. Enter your e-mail address. You will receive e-mail notification when new information is available in [address] 9. Click Sign Up. You can now view and download portions of your medical record. 10. Click the Download Summary menu link to download a portable copy of your medical information. If you have questions, please visit the Frequently Asked Questions section of the [employer] website. Remember, [employer] is NOT to be used for urgent needs. For medical emergencies, dial 911. Now available from your iPhone and Android! Please provide this summary of care documentation to your next provider. Your primary care clinician is listed as Geneva Hines. If you have any questions after today's visit, please call [phone].

## 2018-02-01 NOTE — PROGRESS NOTES
HISTORY OF PRESENT ILLNESS  Cassie Thomas is a 78 y.o. female. HPI  Patient of Dr Sydnee Serna who presents with complaints of right flank pain that has been constant for the past week; has been dull pain at 5/10 level but intensifies to sharp pain at level 8/10 and pain radiates around torso to Right lower abdomen. Sharp pain occurs episodically multiple times during the day and has been progressively worsening over the past 2 days. Pain will last for several seconds then subside but will take her breath away when it hits. Has been chronically constipated all of her life but this has worsened over the past week. Denies nausea, vomiting. Has not taken any OTC medications for current symptoms. Denies hematuria or urinary symptoms. Has not had history of renal stones. Denies any recent URI or other illnesses. She is very worried that she may have appendicitis. Patient Active Problem List   Diagnosis Code    Malignant neoplasm of left female breast ([facility] [address] C50.912    Dyslipidemia, goal LDL below 100 E78.5     Past Surgical History:   Procedure Laterality Date    HX BREAST BIOPSY Left yrs    neg; stereotactic    HX BREAST LUMPECTOMY Left 2015    HX BREAST REDUCTION Bilateral yrs ago    HX GYN      Hysterectomy     Social History     Social History    Marital status:      Spouse name: N/A    Number of children: N/A    Years of education: N/A     Occupational History    Not on file.      Social History Main Topics    Smoking status: Former Smoker    Smokeless tobacco: Never Used    Alcohol use No    Drug use: No    Sexual activity: Yes     Partners: Male     Other Topics Concern    Not on file     Social History Narrative     Family History   Problem Relation Age of Onset    Breast Cancer Maternal Grandmother     Heart Disease Mother     Heart Disease Father      Current Outpatient Prescriptions   Medication Sig    bisacodyl (DULCOLAX, BISACODYL,) 5 mg EC tablet Take 15 mg by mouth daily as needed for Constipation.  meloxicam (MOBIC) 15 mg tablet Take 1 Tab by mouth daily.  DULoxetine (CYMBALTA) 60 mg capsule Take 1 Cap by mouth daily.  gabapentin (NEURONTIN) 300 mg capsule Take 1 Cap by mouth three (3) times daily.  omeprazole (PRILOSEC) 20 mg capsule Take 1 Cap by mouth daily.  zaleplon (SONATA) 10 mg capsule Take 1 Cap by mouth nightly. Max Daily Amount: 10 mg. No current facility-administered medications for this visit. No Known Allergies  Immunization History   Administered Date(s) Administered    Influenza High Dose Vaccine PF 09/27/2017    Pneumococcal Polysaccharide (PPSV-23) 05/09/2017         Review of Systems   Constitutional: Positive for malaise/fatigue. Negative for chills and fever. HENT: Negative for sinus pain and sore throat. Respiratory: Negative for cough, sputum production and shortness of breath. Cardiovascular: Negative for chest pain and palpitations. Gastrointestinal: Positive for abdominal pain and constipation. Negative for blood in stool, diarrhea, nausea and vomiting. Genitourinary: Positive for flank pain. Negative for dysuria, frequency, hematuria and urgency. Musculoskeletal: Positive for back pain. Neurological: Negative for dizziness, tingling and headaches. /88 (BP 1 Location: Right arm, BP Patient Position: Sitting)  Pulse 97  Temp 98.4 °F (36.9 °C) (Oral)   Resp 12  Ht 5' 1\" (1.549 m)  Wt 136 lb 6.4 oz (61.9 kg)  SpO2 98%  BMI 25.77 kg/m2  Physical Exam   Constitutional: She is oriented to person, place, and time. She appears well-developed and well-nourished. HENT:   Head: Normocephalic and atraumatic. Neck: Normal range of motion. Neck supple. No thyromegaly present. Cardiovascular: Normal rate and regular rhythm. Pulmonary/Chest: Effort normal and breath sounds normal. She has no wheezes. Abdominal: Soft. There is tenderness in the right lower quadrant. There is rebound. There is no guarding. Musculoskeletal:        Lumbar back: She exhibits tenderness. Back:    Lymphadenopathy:     She has no cervical adenopathy. Neurological: She is alert and oriented to person, place, and time. Skin: Skin is warm and dry. Psychiatric: She has a normal mood and affect. Her behavior is normal.   Nursing note and vitals reviewed. Results for orders placed or performed in visit on 01/31/18   AMB POC URINALYSIS DIP STICK AUTO W/O MICRO     Status: Normal   Result Value Ref Range Status    Color (UA POC) Yellow  Final    Clarity (UA POC) Clear  Final    Glucose (UA POC) Negative Negative Final    Bilirubin (UA POC) Negative Negative Final    Ketones (UA POC) Negative Negative Final    Specific gravity (UA POC) 1.010 1.001 - 1.035 Final    Blood (UA POC) Negative Negative Final    pH (UA POC) 5.5 4.6 - 8.0 Final    Protein (UA POC) Negative Negative Final    Urobilinogen (UA POC) 0.2 mg/dL 0.2 - 1 Final    Nitrites (UA POC) Negative Negative Final    Leukocyte esterase (UA POC) Negative Negative Final       ASSESSMENT and PLAN  Diagnoses and all orders for this visit:    1. Low back pain, unspecified back pain laterality, unspecified chronicity, with sciatica presence unspecified  -     AMB POC URINALYSIS DIP STICK AUTO W/O MICRO -- appears unremarkable    2. Acute right flank pain  -     CT ABD PELV W CONT; Future    3. Right lower quadrant abdominal pain -- CT scan to evaluate for progressively worsening lower abdominal pain.   Discussed with Dr Estefany Colon.  -     CT ABD PELV W CONT; Future      lab results and schedule of future lab studies reviewed with patient  reviewed diet, exercise and weight control  reviewed medications and side effects in detail

## 2018-02-06 ENCOUNTER — HOSPITAL ENCOUNTER (OUTPATIENT)
Dept: PHYSICAL THERAPY | Age: 78
Discharge: HOME | End: 2018-02-06
Payer: MEDICARE

## 2018-02-06 PROCEDURE — 97140 MANUAL THERAPY 1/> REGIONS: CPT

## 2018-02-06 PROCEDURE — 97530 THERAPEUTIC ACTIVITIES: CPT

## 2018-02-06 NOTE — PROGRESS NOTES
[facility]  Lymphedema Clinic and Cancer Rehabilitation  [address] [place] [address] Francisca Crowder      LYmphedema Therapy  Visit: 2    [x]        Daily note               []       30 day/10th visit progress note    NAME: Naima Mejia  DATE: 2/6/2018    GOALS  Short term goals  Time frame: 2-4 weeks  1. Instruct the patient to be independent with proper skin care to prevent future skin breakdown and decrease the potential risk for infections that are associated with Lymphedema. 2. Patient will be independent with a personal lymphedema exercise program to assist with the lymphatic flow and reduce limb volume L UE by 100-150 mL. 3. Patient will understand the signs and symptoms of acute infection. Long term goals  Time frame: 4-8 weeks     1. Patient will have knowledge of the compression options and acquire a safe and                                 appropriate daytime and night time compression system to prevent                                                 re-accumulation of fluid. 2.  Patient or family will be able to don/doff garments independently. Garment                                 system effectiveness will be evaluated prior to discharge for better long-term                                 management and outcomes. 3.  Improvement in LLIS by 2-4 points. 4.   To transition patient to independent restorative phase of CDT. SUBJECTIVE REPORT:  Pt arrives to treatment with compression bandaging removed L UE, stating she has been wearing compression garments since bandaging removed 2-3 days after initial treatment. Pt states the currently worn compression sleeve \"cuts into my wrist.\"  Pt reports she is unaware of how old the sleeve is, or when she purchased it. States, \"I have several different sleeves, and I don't know which ones I should be wearing. \"  Pt states she will bring them all to her next scheduled appt. Pain: 1/10, heaviness, skin tightness           Gait:  indep  ADLs:  indep  Treatment Response:  Unable to determine benefit of compression bandaging applied prior treatment due to patient removing bandaging and returning to wear of compression garments secondary to being out of town. Reviewed packet received at evaluation. Function: see eval.    TREATMENT AND OBJECTIVE DATA SUMMARY:       Therapeutic Activity:   15 minutes   Treatment time: 9:27-9:42 am   Post MLD:  Therapist applied L UE multi-layer compression bandaging as follows:  Finger wraps  Stockinette  Cast padding/Celona padding  Short stretch 4, 6, 8 cm bandaging  Secured with tape/covered with G tubigrip to promote bandaging remaining in place next scheduled appt. Home management of compression bandaging as follows:  Compression bandaging instructions:  1. Compression bandaging will be applied twice a week by therapist in clinic, with adjustments to be made at home as indicated if bandaging becomes loose or uncomfortable. 2. If tolerated, remain bandaged between appointments with therapist, removing under the following conditions--DO NOT WAIT FOR A RETURN PHONE CALL FROM CLINIC:    -Numbness/tingling in extremity different from what you have experienced without the bandages in place.  -Compromise in circulation, monitoring blood refill into toes after applying gentle pressure to toes.  -Onset of pain in extremity that is sudden and severe in nature. -Redness, warmth in limb, and/or fever, flu-like symptoms, which may indicate infection. If this occurs, call your doctor right away. If you note a sudden increase in swelling in extremity, with or without redness/warmth, go to the emergency room as soon as possible to have blood clot ruled out. 3. Compression bandaging supplies that can be laundered are the brown compression wraps and any foam applied for padding. Launder in washer/dryer with NO fabric softener, bleach, woolite.   Dry on a low heat. 4. Once compression garments are ordered, compression bandaging will be continued until garments arrive for fitting. This process can take several weeks. Therapeutic Exercise/Procedure Deferred                 Home program: Patient to perform daily to BID skin care, deep abdominal breathing, exercise routine, including lymphedema specific exercises as previously instructed. To establish effective technique at future appt. Rationale: Exercise will increase the lymph angiomotoricity and tissue pressure of the skin and thus decrease swelling. Manual Lymphatic Drainage (MLD) 56 minutes   Treatment time: 8:30-9:26 am  Area to decongest: L UE/Upper quadrant. Sequence used and effectiveness: stim R axillary/L inguinal/supraclavicular nodes, shoulder collectors, cephalic vasa vasorum. Activated AAA/CLAUDIO/L AI anastomoses. Stim parasternal/intercostal precollectors. Full UE sequence L UE, including medial to lateral upper arm technique. Fibrosis technique forearm to address tissue changes. Skin/wound care/debridement: Skin intact. Applied low pH lotion L UE using upward strokes. Kinesiotaping: deferred   Girth/Volume measurement: Repeated limb volumes L UE, with limb volume discrepancy increased from 16.47% to 18.04% when previously seen. TOTAL TREATMENT 72 mins     ASSESSMENT:   Treatment effectiveness and tolerance: Unable to determine benefit from previous treatment secondary to compression bandaging removed due to length of time between appts, patient traveling. Treatment tolerated well. Pt verbalizes understanding to arrive with compression bandaging in place L UE, accept in the event above precautions occur. Progress toward goals: Ongoing.      PLAN OF CARE:   Changes to the plan of care: Pt would benefit from treatment 2x/week in clinic consistently with goal of fitting with custom flat knit compression sleeve/glove upon reduction of limb volumes regarding L UE lymphedema, breast cancer related. Pt is traveling currently, which may limit consist treatment at this time. Spouse may be willing to apply compression bandaging between treatments, which will be discussed further at next scheduled appt.    Frequency: 1-2 x/week   Other: karel Lemus, PT, CLT

## 2018-02-07 ENCOUNTER — TELEPHONE (OUTPATIENT)
Dept: INTERNAL MEDICINE CLINIC | Age: 78
End: 2018-02-07

## 2018-02-07 RX ORDER — GABAPENTIN 300 MG/1
CAPSULE ORAL
Qty: 240 CAP | Refills: 1 | Status: SHIPPED | OUTPATIENT
Start: 2018-02-07 | End: 2018-04-15 | Stop reason: SDUPTHER

## 2018-02-07 NOTE — TELEPHONE ENCOUNTER
Patient states her gabapentin rx was written incorrect. States she takes 2 in the morning , 2 during the day , and 4 at night at 300 mg per tab.

## 2018-02-09 ENCOUNTER — HOSPITAL ENCOUNTER (OUTPATIENT)
Dept: PHYSICAL THERAPY | Age: 78
Discharge: HOME | End: 2018-02-09
Payer: MEDICARE

## 2018-02-09 PROCEDURE — 97140 MANUAL THERAPY 1/> REGIONS: CPT

## 2018-02-09 PROCEDURE — 97760 ORTHOTIC MGMT&TRAING 1ST ENC: CPT

## 2018-02-09 NOTE — PROGRESS NOTES
[facility]  Lymphedema Clinic and Cancer Rehabilitation  [address] [place] [address]  [address] [place] [address],[address]  Francisca Black      LYmphedema Therapy  Visit: 3    [x]        Daily note               []       30 day/10th visit progress note    NAME: Natanael Gibbons  DATE: 2/9/2018    GOALS  Short term goals  Time frame: 2-4 weeks  1. Instruct the patient to be independent with proper skin care to prevent future skin breakdown and decrease the potential risk for infections that are associated with Lymphedema. 2. Patient will be independent with a personal lymphedema exercise program to assist with the lymphatic flow and reduce limb volume L UE by 100-150 mL. 2/9/2018 Limb volumes fluctuating. 3. Patient will understand the signs and symptoms of acute infection. Long term goals  Time frame: 4-8 weeks     1. Patient will have knowledge of the compression options and acquire a safe and                                 appropriate daytime and night time compression system to prevent                                                 re-accumulation of fluid. 2/9/2018 Garments were sorted, with old, ineffective garments discarded with patient's permission. Discussed benefit of custom armsleeve. 2.  Patient or family will be able to don/doff garments independently. Garment                                 system effectiveness will be evaluated prior to discharge for better long-term                                 management and outcomes. 3.  Improvement in LLIS by 2-4 points. 4.   To transition patient to independent restorative phase of CDT. SUBJECTIVE REPORT:  Pt arrives to treatment with compression bandaging removed L UE compression bandaging in place. States she tolerated well. Pt brings RM compression sleeves to clinic for therapist to assist with assessing fit and discarding old garments.    Pain: 1/10, heaviness, skin tightness           Gait:  indep  ADLs: indep  Treatment Response:  Unable to determine benefit of compression bandaging applied prior treatment due to patient removing bandaging and returning to wear of compression garments secondary to being out of town. Reviewed packet received at evaluation. Function: see eval.    TREATMENT AND OBJECTIVE DATA SUMMARY:       Orthotic management: 9:33-9:42 am 9 minutes      Sorted daytime compression sleeves/gauntlets, discarding old, worn armsleeves/gauntlets, with most recently purchased armsleeve and three newer gauntlets to be worn between bandaging applications as needed. Pt in agreement with plan. Able to don/doff compression garments indep. Compression garment routine:  1. Apply daytime compression stocking to clean, dry extremity first thing in the morning, EVERY MORNING. 2. Wear compression garments until bedtime, adjusting throughout the day as needed should garment wrinkle or crease. Problem areas can be at joints, and top of garment, ensuring proximal aspect of garment positioned appropriately on extremity. 3. Launder garment nightly either by hand or washing machine in a garment bag or pillowcase. Use mild detergent with NO FABRIC SOFTENER, NO BLEACH, NO WOOLITE. Wash in cool/warm water. 4. Dry garment in dryer on low heat right side out in garment bag or pillowcase. 5. Perform skin care to extremity, cleansing skin daily with soap and water, and using low pH lotion, upward strokes to stimulate lymphatic vessels. 6. Daytime compression grments are NOT safe to sleep in, so remove at bedtime. 7. Perform exercise program with compression garments on. Signs/Symptoms of infection include:  Fever, flu-like symptoms, pain/redness/warmthin extremity, sometimes with increase in swelling present. Stop wearing your compression garment if this occurs. Call your doctor immediately or go to the Emergency room to address potential infection.   Remove compression with changes in circulation, numbness in extremity different from what you may experience when not wearing compression garment, pain, unexplained shortness of breath. Notify clinic if swelling increase noted prior to next scheduled appt. Night time compression may be needed for optimal management of lymphedema. Return in 2 weeks for follow up appt. Manual Lymphatic Drainage (MLD) 62 minutes   Treatment time: 8:30-9:32 am  Area to decongest: L UE/Upper quadrant. Sequence used and effectiveness: stim R axillary/L inguinal/supraclavicular nodes, shoulder collectors, cephalic vasa vasorum. Activated AAA/CLAUDIO/L AI anastomoses. Stim parasternal/intercostal precollectors. Full UE sequence L UE, including medial to lateral upper arm technique. Fibrosis technique forearm to address tissue changes. Skin/wound care/debridement: Skin intact. Applied low pH lotion L UE using upward strokes. L UE compression bandaging: Therapist applied L UE multi-layer compression bandaging as follows:  Finger wraps  Stockinette  Cast padding/Celona padding  Short stretch 4, 6, 8 cm bandaging  Secured with tape/covered with G tubigrip to promote bandaging remaining in place next scheduled appt. Home management of compression bandaging as follows:  Compression bandaging instructions:  8. Compression bandaging will be applied twice a week by therapist in clinic, with adjustments to be made at home as indicated if bandaging becomes loose or uncomfortable. 9. If tolerated, remain bandaged between appointments with therapist, removing under the following conditions--DO NOT WAIT FOR A RETURN PHONE CALL FROM CLINIC:    -Numbness/tingling in extremity different from what you have experienced without the bandages in place.  -Compromise in circulation, monitoring blood refill into toes after applying gentle pressure to toes.  -Onset of pain in extremity that is sudden and severe in nature.   -Redness, warmth in limb, and/or fever, flu-like symptoms, which may indicate infection. If this occurs, call your doctor right away. If you note a sudden increase in swelling in extremity, with or without redness/warmth, go to the emergency room as soon as possible to have blood clot ruled out. 10. Compression bandaging supplies that can be laundered are the brown compression wraps and any foam applied for padding. Launder in washer/dryer with NO fabric softener, bleach, woolite. Dry on a low heat. 11. Once compression garments are ordered, compression bandaging will be continued until garments arrive for fitting. This process can take several weeks. Kinesiotaping: deferred   Girth/Volume measurement: Repeated limb volumes L UE, with limb volume discrepancy decreased from 18.04% to 17.95% previous appt. TOTAL TREATMENT 72 mins     ASSESSMENT:   Treatment effectiveness and tolerance: Slight reduction in limb volumes noted. Treatment tolerated well. Pt verbalizes understanding to arrive with compression bandaging in place L UE, accept in the event above precautions occur. Pt advised that optimal outcome with outpatient treatment requires remaining bandaged between appts, 2x/week frequency. Pt verbalizes understanding that custom flat knit compression armsleeve will likely be indicated for optimal fit and management daytime garments. Progress toward goals: Ongoing. PLAN OF CARE:   Changes to the plan of care: Pt would benefit from treatment 2x/week in clinic consistently with goal of fitting with custom flat knit compression sleeve/glove upon reduction of limb volumes regarding L UE lymphedema, breast cancer related. Pt is traveling currently, which may limit consist treatment at this time. Spouse may be willing to apply compression bandaging between treatments, which will be discussed further at next scheduled appt.    Frequency: 1-2 x/week   Other: karel Gardner, PT, CLT

## 2018-02-15 ENCOUNTER — HOSPITAL ENCOUNTER (OUTPATIENT)
Dept: PHYSICAL THERAPY | Age: 78
Discharge: HOME | End: 2018-02-15
Payer: MEDICARE

## 2018-02-15 PROCEDURE — 97140 MANUAL THERAPY 1/> REGIONS: CPT

## 2018-02-15 PROCEDURE — 97110 THERAPEUTIC EXERCISES: CPT

## 2018-02-15 PROCEDURE — 97530 THERAPEUTIC ACTIVITIES: CPT

## 2018-02-15 NOTE — PROGRESS NOTES
[facility]  Lymphedema Clinic and Cancer Rehabilitation  [address] [facility] [facility]  [address] [place] [address],[address]  Francisca Black      LYmphedema Therapy  Visit: 4    [x]        Daily note               []       30 day/10th visit progress note    NAME: Armand Ballesteros  DATE: 2/15/2018    GOALS  Short term goals  Time frame: 2-4 weeks  1. Instruct the patient to be independent with proper skin care to prevent future skin breakdown and decrease the potential risk for infections that are associated with Lymphedema. 2. Patient will be independent with a personal lymphedema exercise program to assist with the lymphatic flow and reduce limb volume L UE by 100-150 mL. 2/9/2018 Limb volumes fluctuating. 3. Patient will understand the signs and symptoms of acute infection. Long term goals  Time frame: 4-8 weeks     1. Patient will have knowledge of the compression options and acquire a safe and                                 appropriate daytime and night time compression system to prevent                                                 re-accumulation of fluid. 2/9/2018 Garments were sorted, with old, ineffective garments discarded with patient's permission. Discussed benefit of custom armsleeve. 2.  Patient or family will be able to don/doff garments independently. Garment                                 system effectiveness will be evaluated prior to discharge for better long-term                                 management and outcomes. 3.  Improvement in LLIS by 2-4 points. 4.   To transition patient to independent restorative phase of CDT. SUBJECTIVE REPORT:  Pt arrives to treatment with compression bandaging removed L UE with Kathi Yates in place. Pt states she remained in bandages 4 days, then removed, and applied [employer] and bandaging over garment for air travel.      Pain: 1/10, heaviness, skin tightness           Gait:  indep  ADLs: indep  Treatment Response:  Unable to determine benefit of compression bandaging applied prior treatment due to patient removing bandaging and returning to wear of compression garments secondary to being out of town. Reviewed packet received at evaluation. Function: see eval.    TREATMENT AND OBJECTIVE DATA SUMMARY:       Therapeutic exercise: 2:10-2:20 pm Pt performed the following with assist of therapist:  Hand open/close, wrist flex/ext, wrist circumduction, elbow flex/ext, rowing, paddling, overhead reach L UE x 10. Pt instructed to continue 2x/day with compression bandaging in place. Pt verbalizes and demonstrates good understanding of HEP. Manual Lymphatic Drainage (MLD) 59 minutes   Treatment time: 12:53-1:52 pm  Area to decongest: L UE/Upper quadrant. Sequence used and effectiveness: stim R axillary/L inguinal/supraclavicular nodes, shoulder collectors, cephalic vasa vasorum. Activated AAA/CLAUDIO/L AI anastomoses. Stim parasternal/intercostal precollectors. Upper quadrant sequence anterior/posterior, with additional time spent on trunk today prior to treatment of UE. Full UE sequence L UE, including medial to lateral upper arm technique. Fibrosis technique forearm to address tissue changes. Skin/wound care/debridement: Skin intact. Applied low pH lotion L UE using upward strokes. Therapeutic activity: 1:53-2:08 pm   15 min Therapist applied L UE multi-layer compression bandaging as follows:  Finger wraps  Stockinette  Cast padding/Celona padding  Short stretch 4, 6, 8 cm bandaging  Secured with tape/covered with G tubigrip to promote bandaging remaining in place next scheduled appt. Spouse present for application of bandaging today, with verbal and demo instruction provided by therapist today to promote indep with home bandaging program upon optimal reduction while awaiting garment orders for fit.   Spouse/pt will need to be indep in task to reduce treatment frequency during this time. Home management of compression bandaging as follows:  Compression bandaging instructions:  1. Compression bandaging will be applied twice a week by therapist in clinic, with adjustments to be made at home as indicated if bandaging becomes loose or uncomfortable. 2. If tolerated, remain bandaged between appointments with therapist, removing under the following conditions--DO NOT WAIT FOR A RETURN PHONE CALL FROM CLINIC:    -Numbness/tingling in extremity different from what you have experienced without the bandages in place.  -Compromise in circulation, monitoring blood refill into toes after applying gentle pressure to toes.  -Onset of pain in extremity that is sudden and severe in nature. -Redness, warmth in limb, and/or fever, flu-like symptoms, which may indicate infection. If this occurs, call your doctor right away. If you note a sudden increase in swelling in extremity, with or without redness/warmth, go to the emergency room as soon as possible to have blood clot ruled out. 3. Compression bandaging supplies that can be laundered are the brown compression wraps and any foam applied for padding. Launder in washer/dryer with NO fabric softener, bleach, woolite. Dry on a low heat. 4. Once compression garments are ordered, compression bandaging will be continued until garments arrive for fitting. This process can take several weeks. Kinesiotaping: deferred   Girth/Volume measurement: Repeated limb volumes L UE, volumes stable since last scheduled appt. TOTAL TREATMENT 87 mins     ASSESSMENT:   Treatment effectiveness and tolerance: Progress with treatment has been limited secondary to decreased frequency of treatment with pt traveling. Pt advised that, for optimal response to treatment, she will need to be seen 2x/week, and remain bandaged between appts.   Pt verbalizes understanding to arrive with compression bandaging in place L UE next scheduled appt,  accept in the event above precautions occur. Pt verbalizes understanding that custom flat knit compression armsleeve will likely be indicated for optimal fit and management daytime garments. Progress toward goals: Ongoing. PLAN OF CARE:   Changes to the plan of care: Pt would benefit from treatment 2x/week in clinic consistently with goal of fitting with custom flat knit compression sleeve/glove upon reduction of limb volumes regarding L UE lymphedema, breast cancer related. Pt is home for the next two weeks. Spouse willing to apply compression bandaging between treatments, which will be discussed further at next scheduled appt as needed.    Frequency: 1-2 x/week   Other: karel Hernandez, PT, CLT

## 2018-02-19 ENCOUNTER — HOSPITAL ENCOUNTER (OUTPATIENT)
Dept: PHYSICAL THERAPY | Age: 78
Discharge: HOME | End: 2018-02-19
Payer: MEDICARE

## 2018-02-19 PROCEDURE — 97140 MANUAL THERAPY 1/> REGIONS: CPT

## 2018-02-19 PROCEDURE — 97530 THERAPEUTIC ACTIVITIES: CPT

## 2018-02-19 NOTE — PROGRESS NOTES
[facility]  Lymphedema Clinic and Cancer Rehabilitation  [facility]  [address] [place] [address],[address]  Francisca Black      LYmphedema Therapy  Visit: 5    [x]        Daily note               []       30 day/10th visit progress note    NAME: Brennen Grullon  DATE: 2/19/2018    GOALS  Short term goals  Time frame: 2-4 weeks  1. Instruct the patient to be independent with proper skin care to prevent future skin breakdown and decrease the potential risk for infections that are associated with Lymphedema. 2. Patient will be independent with a personal lymphedema exercise program to assist with the lymphatic flow and reduce limb volume L UE by 100-150 mL. 2/9/2018 Limb volumes fluctuating. 3. Patient will understand the signs and symptoms of acute infection. Long term goals  Time frame: 4-8 weeks     1. Patient will have knowledge of the compression options and acquire a safe and                                 appropriate daytime and night time compression system to prevent                                                 re-accumulation of fluid. 2/9/2018 Garments were sorted, with old, ineffective garments discarded with patient's permission. Discussed benefit of custom armsleeve. 2.  Patient or family will be able to don/doff garments independently. Garment                                 system effectiveness will be evaluated prior to discharge for better long-term                                 management and outcomes. 3.  Improvement in LLIS by 2-4 points. 4.   To transition patient to independent restorative phase of CDT. SUBJECTIVE REPORT:  Pt arrives to treatment with compression bandaging removed L UE stating she was experiencing discomfort last in hand region, removed for shower, and wore night time garment to be last night. States notes improvement in hand involvement.    Pain: 1/10, heaviness, skin tightness           Gait:  indep  ADLs: indep  Treatment Response:  Slight improvement in limb volume measurements today. See scanned graph. Reviewed packet received at evaluation. Function: see eval.    TREATMENT AND OBJECTIVE DATA SUMMARY:       Therapeutic exercise:  Pt to continue the following with assist of therapist:  Hand open/close, wrist flex/ext, wrist circumduction, elbow flex/ext, rowing, paddling, overhead reach L UE x 10. Pt instructed to continue 2x/day with compression bandaging in place. Pt verbalizes and demonstrates good understanding of HEP. Manual Lymphatic Drainage (MLD) 60 minutes   Treatment time: 9:40-10:40 am  Area to decongest: L UE/Upper quadrant. Sequence used and effectiveness: stim R axillary/L inguinal/supraclavicular nodes, shoulder collectors, cephalic vasa vasorum. Activated AAA/CLAUDIO/L AI anastomoses. Stim parasternal/intercostal precollectors. Upper quadrant sequence anterior/posterior, with additional time spent on trunk today prior to treatment of UE. Full UE sequence L UE, including medial to lateral upper arm technique. Fibrosis technique forearm to address tissue changes. Skin/wound care/debridement: Skin intact. Applied low pH lotion L UE using upward strokes. Therapeutic activity: 10:42-10:55 am   13 min Therapist applied L UE multi-layer compression bandaging as follows:  Finger wraps  Stockinette  Komprex II  Biafleece x 2  Short stretch 6 x 2, 8 cm bandaging  Secured with tape/covered with G tubigrip to promote bandaging remaining in place next scheduled appt. Additional overlap of bandaging forearm, with improvement noted elbow/upper arm measurements, addressing area of greater involvement with increase in layers of short stretch compression wrap. Home management of compression bandaging as follows:  Compression bandaging instructions:  1.   Compression bandaging will be applied twice a week by therapist in clinic, with adjustments to be made at home as indicated if bandaging becomes loose or uncomfortable. 2. If tolerated, remain bandaged between appointments with therapist, removing under the following conditions--DO NOT WAIT FOR A RETURN PHONE CALL FROM CLINIC:    -Numbness/tingling in extremity different from what you have experienced without the bandages in place.  -Compromise in circulation, monitoring blood refill into toes after applying gentle pressure to toes.  -Onset of pain in extremity that is sudden and severe in nature. -Redness, warmth in limb, and/or fever, flu-like symptoms, which may indicate infection. If this occurs, call your doctor right away. If you note a sudden increase in swelling in extremity, with or without redness/warmth, go to the emergency room as soon as possible to have blood clot ruled out. 3. Compression bandaging supplies that can be laundered are the brown compression wraps and any foam applied for padding. Launder in washer/dryer with NO fabric softener, bleach, woolite. Dry on a low heat. 4. Once compression garments are ordered, compression bandaging will be continued until garments arrive for fitting. This process can take several weeks. Kinesiotaping: deferred   Girth/Volume measurement: Repeated limb volumes L UE, volumes noted to be slightly improved, with overall reduction of 35.36 mL, since last visit. TOTAL TREATMENT 76 mins     ASSESSMENT:   Treatment effectiveness and tolerance: Progress with treatment has been limited secondary to decreased frequency of treatment with pt traveling, arriving to treatment with compression bandaging removed. Pt advised that, for optimal response to treatment, she will need to be seen 2x/week, and remain bandaged between appts. Pt verbalizes understanding to arrive with compression bandaging in place L UE next scheduled appt,  accept in the event above precautions occur.   Pt verbalizes understanding that custom flat knit compression armsleeve will likely be indicated for optimal fit and management daytime garments. Progress toward goals: Ongoing. PLAN OF CARE:   Changes to the plan of care: Pt would benefit from treatment 2x/week in clinic consistently with goal of fitting with custom flat knit compression sleeve/glove upon reduction of limb volumes regarding L UE lymphedema, breast cancer related. Pt is home for the next two weeks. Spouse willing to apply compression bandaging between treatments, which will be discussed further at next scheduled appt as needed.    Frequency: 1-2 x/week   Other: karel Epperson, PT, CLT

## 2018-02-22 ENCOUNTER — HOSPITAL ENCOUNTER (OUTPATIENT)
Dept: PHYSICAL THERAPY | Age: 78
Discharge: HOME | End: 2018-02-22
Payer: MEDICARE

## 2018-02-22 PROCEDURE — 97140 MANUAL THERAPY 1/> REGIONS: CPT

## 2018-02-22 NOTE — PROGRESS NOTES
[facility]  Lymphedema Clinic and Cancer Rehabilitation  [facility]  [address] [place] [address],[address]  Francisca Black      LYmphedema Therapy  Visit: 6    [x]        Daily note               []       30 day/10th visit progress note    NAME: Stephani Woodard  DATE: 2/22/2018    GOALS  Short term goals  Time frame: 2-4 weeks  1. Instruct the patient to be independent with proper skin care to prevent future skin breakdown and decrease the potential risk for infections that are associated with Lymphedema. 2. Patient will be independent with a personal lymphedema exercise program to assist with the lymphatic flow and reduce limb volume L UE by 100-150 mL. 2/9/2018 Limb volumes fluctuating. 2/22/2018 Limb volume reduction noted L UE 89.16 mL from most severe measurements on 2/6/2018. 3. Patient will understand the signs and symptoms of acute infection. Long term goals  Time frame: 4-8 weeks     1. Patient will have knowledge of the compression options and acquire a safe and                                 appropriate daytime and night time compression system to prevent                                                 re-accumulation of fluid. 2/9/2018 Garments were sorted, with old, ineffective garments discarded with patient's permission. Discussed benefit of custom armsleeve. 2.  Patient or family will be able to don/doff garments independently. Garment                                 system effectiveness will be evaluated prior to discharge for better long-term                                 management and outcomes. 3.  Improvement in LLIS by 2-4 points. 4.   To transition patient to independent restorative phase of CDT. SUBJECTIVE REPORT:  Pt arrives to treatment with compression bandaging removed L UE stating she showered this morning.   Pt agreeable to arriving to next scheduled appt without bandaging in place in the event that below precautions do not present. States notes improvement in hand involvement. Pain: 1/10, heaviness, skin tightness           Gait:  indep  ADLs:  indep  Treatment Response:  Slight improvement in limb volume measurements today. See scanned graph. Reviewed packet received at evaluation. Function: In compliance with CMSs Claims Based Outcome Reporting, the following G-code set was chosen for this patient based on their primary functional limitation being treated: The outcome measure chosen to determine the severity of the functional limitation was the LLIS with a score of 26/76 which was correlated with the impairment scale. ? Other PT/OT Primary Functional Limitations:     - CURRENT STATUS: CJ - 20%-39% impaired, limited or restricted    - GOAL STATUS: CH - 0% impaired, limited or restricted    - D/C STATUS:  ---------------To be determined---------------       Decline in LLIS functional outcomes measure likely due to functional limitations secondary to compression bandaging L UE.      TREATMENT AND OBJECTIVE DATA SUMMARY:       Therapeutic exercise:  Pt to continue the following with assist of therapist:  Hand open/close, wrist flex/ext, wrist circumduction, elbow flex/ext, rowing, paddling, overhead reach L UE x 10. Pt instructed to continue 2x/day with compression bandaging in place. Pt verbalizes and demonstrates good understanding of HEP. Manual Therapy 65 minutes   Treatment time: 8:30-9:35 am  Area to decongest: L UE/Upper quadrant. Sequence used and effectiveness: stim R axillary/L inguinal/supraclavicular nodes, shoulder collectors, cephalic vasa vasorum. Activated AAA/CLAUDIO/L AI anastomoses. Stim parasternal/intercostal precollectors. Upper quadrant sequence anterior/posterior, with additional time spent on trunk today prior to treatment of UE. Full UE sequence L UE, including medial to lateral upper arm technique. Fibrosis technique forearm to address tissue changes. Skin/wound care/debridement: Skin intact. Applied low pH lotion L UE using upward strokes. Therapist applied L UE multi-layer compression bandaging as follows:  Finger wraps  Stockinette  Biafleece x 2  Short stretch 6 x 2, 8 cm bandaging  Secured with tape/covered with G tubigrip to promote bandaging remaining in place next scheduled appt. Additional overlap of bandaging forearm, with improvement noted elbow/upper arm measurements, addressing area of greater involvement with increase in layers of short stretch compression wrap. Home management of compression bandaging as follows:  Compression bandaging instructions:  1. Compression bandaging will be applied twice a week by therapist in clinic, with adjustments to be made at home as indicated if bandaging becomes loose or uncomfortable. 2. If tolerated, remain bandaged between appointments with therapist, removing under the following conditions--DO NOT WAIT FOR A RETURN PHONE CALL FROM CLINIC:    -Numbness/tingling in extremity different from what you have experienced without the bandages in place.  -Compromise in circulation, monitoring blood refill into toes after applying gentle pressure to toes.  -Onset of pain in extremity that is sudden and severe in nature. -Redness, warmth in limb, and/or fever, flu-like symptoms, which may indicate infection. If this occurs, call your doctor right away. If you note a sudden increase in swelling in extremity, with or without redness/warmth, go to the emergency room as soon as possible to have blood clot ruled out. 3. Compression bandaging supplies that can be laundered are the brown compression wraps and any foam applied for padding. Launder in washer/dryer with NO fabric softener, bleach, woolite. Dry on a low heat. 4. Once compression garments are ordered, compression bandaging will be continued until garments arrive for fitting. This process can take several weeks. Kinesiotaping: deferred   Girth/Volume measurement: Repeated limb volumes L UE, volumes noted to be  improved, with overall reduction of 89.16 mL, since most severe measurement on 2/6/2018. TOTAL TREATMENT 65 mins     ASSESSMENT:   Treatment effectiveness and tolerance: Note improvement in limb volumes today, with pt to arrive to next scheduled appt with compression bandaging in place. Progress has been limited secondary to decreased frequency of treatment with pt traveling, with further travel planned for next week, and pt arriving to treatment with compression bandaging removed consistently  Pt advised that, for optimal response to treatment, she will need to be seen 2x/week, and remain bandaged between appts. Pt verbalizes understanding to arrive with compression bandaging in place L UE next scheduled appt,  accept in the event above precautions occur. Pt verbalizes understanding that custom flat knit compression armsleeve/hand piece will likely be indicated for optimal fit and management daytime garments. Progress toward goals: See updated goals regarding L UE volume improvement. PLAN OF CARE:   Changes to the plan of care: Pt would benefit from treatment 2x/week in clinic consistently with goal of fitting with custom flat knit compression sleeve/glove upon reduction of limb volumes regarding L UE lymphedema, breast cancer related. Pt traveling again end of next week, then will be home. Patient initially stated spouse was willing to assist with bandaging, however, patient has not been bandaging at home. To address plan for travel next week at patient's next scheduled appt.    Frequency: 1-2 x/week   Other: karel Lemus, PT, CLT

## 2018-02-26 ENCOUNTER — HOSPITAL ENCOUNTER (OUTPATIENT)
Dept: PHYSICAL THERAPY | Age: 78
Discharge: HOME | End: 2018-02-26
Payer: MEDICARE

## 2018-02-26 PROCEDURE — 97530 THERAPEUTIC ACTIVITIES: CPT

## 2018-02-26 PROCEDURE — 97140 MANUAL THERAPY 1/> REGIONS: CPT

## 2018-02-26 NOTE — PROGRESS NOTES
_                                    [facility] and [facility] Rehabilitation  [address] [place] [address]  [address] [place] [address],[address]  Francisca Black      LYmphedema Therapy  Visit: 7    [x]        Daily note               []       30 day/10th visit progress note    NAME: Simeon Nazario  2/26/2018    GOALS  Short term goals  Time frame: 2-4 weeks  1. Instruct the patient to be independent with proper skin care to prevent future skin breakdown and decrease the potential risk for infections that are associated with Lymphedema. 2. Patient will be independent with a personal lymphedema exercise program to assist with the lymphatic flow and reduce limb volume L UE by 100-150 mL. 2/9/2018 Limb volumes fluctuating. 2/22/2018 Limb volume reduction noted L UE 89.16 mL from most severe measurements on 2/6/2018. 3. Patient will understand the signs and symptoms of acute infection. Long term goals  Time frame: 4-8 weeks     1. Patient will have knowledge of the compression options and acquire a safe and                                 appropriate daytime and night time compression system to prevent                                                 re-accumulation of fluid. 2/9/2018 Garments were sorted, with old, ineffective garments discarded with patient's permission. Discussed benefit of custom armsleeve. 2.  Patient or family will be able to don/doff garments independently. Garment                                 system effectiveness will be evaluated prior to discharge for better long-term                                 management and outcomes. 3.  Improvement in LLIS by 2-4 points. 4.   To transition patient to independent restorative phase of CDT. SUBJECTIVE REPORT:  Pt arrives to treatment with compression bandaging removed in place without c/o. States she continues to note improvement.   Reports that she has one more trip planned for this week, and she will be more consistent with appts.   Pain: 1/10, heaviness, skin tightness           Gait:  indep  ADLs:  indep  Treatment Response:  Slight improvement in limb volume measurements today. See scanned graph. Reviewed packet received at evaluation. Function: see prior visit. TREATMENT AND OBJECTIVE DATA SUMMARY:       Therapeutic exercise:  Pt to continue the following with assist of therapist:  Hand open/close, wrist flex/ext, wrist circumduction, elbow flex/ext, rowing, paddling, overhead reach L UE x 10. Pt instructed to continue 2x/day with compression bandaging in place. Pt verbalizes and demonstrates good understanding of HEP. Manual Therapy: 9:45-10:46 am  Therapeutic activity:10:47-11:16   61 minutes  29 minutes     Area to decongest: L UE/Upper quadrant. Manual therapy: stim R axillary/L inguinal/supraclavicular nodes, shoulder collectors, cephalic vasa vasorum. Activated AAA/CLAUDIO/L AI anastomoses. Stim parasternal/intercostal precollectors. Upper quadrant sequence anterior/posterior, with additional time spent on trunk today prior to treatment of UE. Full UE sequence L UE, including medial to lateral upper arm technique. Fibrosis technique forearm to address tissue changes. Scar massage axillary incision with moderate tension noted. STM pect major/minor/subscap addressing muscle and fascial restrictions. Therapeutic activity Skin intact. Applied low pH lotion L UE using upward strokes. Therapist applied L UE multi-layer compression bandaging as follows:  Finger wraps  Stockinette  Cast padding  Short stretch 4, 6, 8 cm bandaging  Secured with tape/covered with G tubigrip to promote bandaging remaining in place next scheduled appt. Additional overlap of bandaging forearm, with improvement noted elbow/upper arm measurements, addressing area of greater involvement with increase in layers of short stretch compression wrap.     Pt advised to remain with bandaged in place during flight to [facility], then begin wearing [employer] night time garment with bandaging overlap at night, [id number] [id number] garments during daytime. Pt advised to arrive to next scheduled appt with [employer] with bandaging overlap in place. Home management of compression bandaging as follows:  Compression bandaging instructions:  1. Compression bandaging will be applied twice a week by therapist in clinic, with adjustments to be made at home as indicated if bandaging becomes loose or uncomfortable. 2. If tolerated, remain bandaged between appointments with therapist, removing under the following conditions--DO NOT WAIT FOR A RETURN PHONE CALL FROM CLINIC:    -Numbness/tingling in extremity different from what you have experienced without the bandages in place.  -Compromise in circulation, monitoring blood refill into toes after applying gentle pressure to toes.  -Onset of pain in extremity that is sudden and severe in nature. -Redness, warmth in limb, and/or fever, flu-like symptoms, which may indicate infection. If this occurs, call your doctor right away. If you note a sudden increase in swelling in extremity, with or without redness/warmth, go to the emergency room as soon as possible to have blood clot ruled out. 3. Compression bandaging supplies that can be laundered are the brown compression wraps and any foam applied for padding. Launder in washer/dryer with NO fabric softener, bleach, woolite. Dry on a low heat. 4. Once compression garments are ordered, compression bandaging will be continued until garments arrive for fitting. This process can take several weeks. Kinesiotaping: deferred   Girth/Volume measurement: Repeated limb volumes L UE, volumes noted to be  improved, with overall reduction of 89.16 mL, since most severe measurement on 2/6/2018. Limb volume discrepancy, while not ideal for completing garment measurements, has reduced from 18.56% to 12.97%.        TOTAL TREATMENT 91 mins ASSESSMENT:   Treatment effectiveness and tolerance: Note stable limb volumes today, with pt to arrive to next scheduled appt as noted above with patient traveling this week. Progress has been limited secondary to decreased frequency of treatment with pt traveling, with further travel planned for next week, and pt arriving to treatment with compression bandaging removed consistently  Pt advised that, for optimal response to treatment, she will need to be seen 2x/week, and remain bandaged between appts. Pt verbalizes understanding to arrive with compression bandaging in place L UE next scheduled appt,  accept in the event above precautions occur. Pt verbalizes understanding that custom flat knit compression armsleeve/hand piece will likely be indicated for optimal fit and management daytime garments. Progress toward goals: See updated goals regarding L UE volume improvement. PLAN OF CARE:   Changes to the plan of care: Pt would benefit from treatment 2x/week in clinic consistently with goal of fitting with custom flat knit compression sleeve/glove upon reduction of limb volumes regarding L UE lymphedema, breast cancer related. Pt traveling again this week, then will be home. Patient initially stated spouse was willing to assist with bandaging, however, patient has not been bandaging at home. To address plan for travel next week at patient's next scheduled appt.    Frequency: 1-2 x/week   Other: karel Paulino, PT, CLT

## 2018-03-06 ENCOUNTER — HOSPITAL ENCOUNTER (OUTPATIENT)
Dept: PHYSICAL THERAPY | Age: 78
Discharge: HOME | End: 2018-03-06
Payer: MEDICARE

## 2018-03-06 PROCEDURE — 97140 MANUAL THERAPY 1/> REGIONS: CPT

## 2018-03-06 PROCEDURE — 97530 THERAPEUTIC ACTIVITIES: CPT

## 2018-03-06 NOTE — PROGRESS NOTES
[facility]  Lymphedema Clinic and Cancer Rehabilitation  [address] [facility]  [address] [place] [address],[address]  Francisca Black      LYmphedema Therapy  Visit: 8    [x]        Daily note               []       30 day/10th visit progress note    NAME: Lisa Luque  Date:  3/6/2018    GOALS  Short term goals  Time frame: 2-4 weeks  1. Instruct the patient to be independent with proper skin care to prevent future skin breakdown and decrease the potential risk for infections that are associated with Lymphedema. 2. Patient will be independent with a personal lymphedema exercise program to assist with the lymphatic flow and reduce limb volume L UE by 100-150 mL. 2/9/2018 Limb volumes fluctuating. 2/22/2018 Limb volume reduction noted L UE 89.16 mL from most severe measurements on 2/6/2018. 3. Patient will understand the signs and symptoms of acute infection. Long term goals  Time frame: 4-8 weeks     1. Patient will have knowledge of the compression options and acquire a safe and                                 appropriate daytime and night time compression system to prevent                                                 re-accumulation of fluid. 2/9/2018 Garments were sorted, with old, ineffective garments discarded with patient's permission. Discussed benefit of custom armsleeve. 2.  Patient or family will be able to don/doff garments independently. Garment                                 system effectiveness will be evaluated prior to discharge for better long-term                                 management and outcomes. 3.  Improvement in LLIS by 2-4 points. 4.   To transition patient to independent restorative phase of CDT. SUBJECTIVE REPORT:  Pt arrives to treatment with compression bandaging removed in due to recent travel. No c/o.    Pain: 1/10, heaviness, skin tightness           Gait:  indep  ADLs:  indep  Treatment Response:  Slight improvement in limb volume measurements today. See scanned graph. Reviewed packet received at evaluation. Function: see prior visit. TREATMENT AND OBJECTIVE DATA SUMMARY:       Therapeutic exercise:  Pt to continue the following with assist of therapist:  Hand open/close, wrist flex/ext, wrist circumduction, elbow flex/ext, rowing, paddling, overhead reach L UE x 10. Pt instructed to continue 2x/day with compression bandaging in place. Pt verbalizes and demonstrates good understanding of HEP. Manual Therapy: 9:58-10:56 am  Therapeutic activity:10:57-11:10 am   58 minutes  13 minutes     Area to decongest: L UE/Upper quadrant. Manual therapy: stim R axillary/L inguinal/supraclavicular nodes, shoulder collectors, cephalic vasa vasorum. Activated AAA/CLAUDIO/L AI anastomoses. Stim parasternal/intercostal precollectors. Upper quadrant sequence anterior/posterior, with additional time spent on trunk today prior to treatment of UE. Full UE sequence L UE, including medial to lateral upper arm technique. Fibrosis technique forearm to address tissue changes. Therapeutic activity Skin intact. Applied low pH lotion L UE using upward strokes. Therapist applied L UE multi-layer compression bandaging as follows:  Finger wraps  Stockinette  Cast padding  Short stretch 4, 6, 8 cm bandaging  Secured with tape/covered with G tubigrip to promote bandaging remaining in place next scheduled appt. Home management of compression bandaging as follows:  Compression bandaging instructions:  1. Compression bandaging will be applied twice a week by therapist in clinic, with adjustments to be made at home as indicated if bandaging becomes loose or uncomfortable.     2. If tolerated, remain bandaged between appointments with therapist, removing under the following conditions--DO NOT WAIT FOR A RETURN PHONE CALL FROM CLINIC:    -Numbness/tingling in extremity different from what you have experienced without the bandages in place.  -Compromise in circulation, monitoring blood refill into toes after applying gentle pressure to toes.  -Onset of pain in extremity that is sudden and severe in nature. -Redness, warmth in limb, and/or fever, flu-like symptoms, which may indicate infection. If this occurs, call your doctor right away. If you note a sudden increase in swelling in extremity, with or without redness/warmth, go to the emergency room as soon as possible to have blood clot ruled out. 3. Compression bandaging supplies that can be laundered are the brown compression wraps and any foam applied for padding. Launder in washer/dryer with NO fabric softener, bleach, woolite. Dry on a low heat. 4. Once compression garments are ordered, compression bandaging will be continued until garments arrive for fitting. This process can take several weeks. Kinesiotaping: deferred   Girth/Volume measurement: Deferred      TOTAL TREATMENT 72 mins     ASSESSMENT:   Treatment effectiveness and tolerance: Progress has been limited secondary to decreased frequency of treatment with pt traveling, with further travel planned for next week, and pt arriving to treatment with compression bandaging removed secondary to recent travel. Pt advised that, for optimal response to treatment, she will need to be seen 2x/week, and remain bandaged between appts. Pt reports no upcoming travel planned. Pt verbalizes understanding that custom flat knit compression armsleeve/hand piece will likely be indicated for optimal fit and management daytime garments. Progress toward goals: Ongoing     PLAN OF CARE:   Changes to the plan of care: Pt would benefit from treatment 2x/week in clinic consistently with goal of fitting with custom flat knit compression sleeve/glove upon reduction of limb volumes regarding L UE lymphedema, breast cancer related.  Patient initially stated spouse was willing to assist with bandaging, however, patient has not been bandaging at home. To address plan for travel next week at patient's next scheduled appt.    Frequency: 1-2 x/week   Other: karel Nguyen, PT, CLT

## 2018-03-09 ENCOUNTER — HOSPITAL ENCOUNTER (OUTPATIENT)
Dept: PHYSICAL THERAPY | Age: 78
Discharge: HOME | End: 2018-03-09
Payer: MEDICARE

## 2018-03-09 PROCEDURE — 97016 VASOPNEUMATIC DEVICE THERAPY: CPT

## 2018-03-09 PROCEDURE — 97140 MANUAL THERAPY 1/> REGIONS: CPT

## 2018-03-09 NOTE — PROGRESS NOTES
_                                    [facility]  [address] [place] [address],[address]  Francisca Black      LYmphedema Therapy  G-code 2/22/2018  Visit: 9    [x]        Daily note               []       30 day/10th visit progress note    NAME: Dolores Kwong  Date:  3/9/2018    GOALS  Short term goals  Time frame: 2-4 weeks  1. Instruct the patient to be independent with proper skin care to prevent future skin breakdown and decrease the potential risk for infections that are associated with Lymphedema. 2. Patient will be independent with a personal lymphedema exercise program to assist with the lymphatic flow and reduce limb volume L UE by 100-150 mL. 2/9/2018 Limb volumes fluctuating. 2/22/2018 Limb volume reduction noted L UE 89.16 mL from most severe measurements on 2/6/2018. 3. Patient will understand the signs and symptoms of acute infection. Long term goals  Time frame: 4-8 weeks     1. Patient will have knowledge of the compression options and acquire a safe and                                 appropriate daytime and night time compression system to prevent                                                 re-accumulation of fluid. 2/9/2018 Garments were sorted, with old, ineffective garments discarded with patient's permission. Discussed benefit of custom armsleeve. 2.  Patient or family will be able to don/doff garments independently. Garment                                 system effectiveness will be evaluated prior to discharge for better long-term                                 management and outcomes. 3.  Improvement in LLIS by 2-4 points. 4.   To transition patient to independent restorative phase of CDT. SUBJECTIVE REPORT:  Pt arrives to treatment with compression bandaging removed in due to recent travel. No c/o.   Pt agreeable to proceeding with Flexitouch pump sequence today, and continuation of compression bandaging. Pt states she notes RM compression sleeve tight at wrist, with pt advised that custom flat knit garments likely indicated for daytime use. Pain: 1/10, heaviness, skin tightness           Gait:  indep  ADLs:  indep  Treatment Response:  Slight improvement in limb volume measurements today. See scanned graph. Reviewed packet received at evaluation. Function: see prior visit. TREATMENT AND OBJECTIVE DATA SUMMARY:       Therapeutic exercise:  Pt to continue the following with assist of therapist:  Hand open/close, wrist flex/ext, wrist circumduction, elbow flex/ext, rowing, paddling, overhead reach L UE x 10. Pt instructed to continue 2x/day with compression bandaging in place. Pt verbalizes and demonstrates good understanding of HEP. Manual Therapy: 10:38-11:05 am  vasopneumatic pump :9:50-10:35 am   27 minutes  45 minutes     Area to decongest: L UE/Upper quadrant. Vasopneumatic pump: Flexitouch upper quadrant/UE sequence. Manual therapy: Skin intact. Applied low pH lotion L UE using upward strokes. Therapist applied L UE multi-layer compression bandaging as follows:  Finger wraps  Stockinette  Cast padding  Short stretch 4, 6, 8 cm bandaging  Secured with tape/covered with G tubigrip to promote bandaging remaining in place next scheduled appt. Home management of compression bandaging as follows:  Compression bandaging instructions:  1. Compression bandaging will be applied twice a week by therapist in clinic, with adjustments to be made at home as indicated if bandaging becomes loose or uncomfortable.     2. If tolerated, remain bandaged between appointments with therapist, removing under the following conditions--DO NOT WAIT FOR A RETURN PHONE [address]:    -Numbness/tingling in extremity different from what you have experienced without the bandages in place.  -Compromise in circulation, monitoring blood refill into toes after applying gentle pressure to toes.  -Onset of pain in extremity that is sudden and severe in nature. -Redness, warmth in limb, and/or fever, flu-like symptoms, which may indicate infection. If this occurs, call your doctor right away. If you note a sudden increase in swelling in extremity, with or without redness/warmth, go to the emergency room as soon as possible to have blood clot ruled out. 3. Compression bandaging supplies that can be laundered are the brown compression wraps and any foam applied for padding. Launder in washer/dryer with NO fabric softener, bleach, woolite. Dry on a low heat. 4. Once compression garments are ordered, compression bandaging will be continued until garments arrive for fitting. This process can take several weeks. Pt advised of benefit of custom flat knit compression garments due to optimal fit and effectiveness, with pt presenting upon evaluation with 16.47% limb volume discrepancy despite daily wear of RM compression garments, night time Solaris Tribute wear. Pt has had interruptions in treatment secondary to travel, which have delayed response to treatment. Pt advised to return to next appt with compression bandaging in place, with plan to repeat limb volume measurements and consider completing measurements for custom daytime garments. Pt advised to bring Solaris Tribute to next scheduled appt, with alteration to be requested as indicated. Girth/Volume measurement: Deferred      TOTAL TREATMENT 75 mins     ASSESSMENT:   Treatment effectiveness and tolerance: Progress has been limited secondary to decreased frequency of treatment with pt traveling, with further travel planned for next week, and pt arriving to treatment with compression bandaging removed secondary to recent travel. Pt advised that, for optimal response to treatment, she will need to be seen 2x/week, and remain bandaged between appts. Pt reports no upcoming travel planned.   Pt verbalizes understanding that custom flat knit compression armsleeve/hand piece will likely be indicated for optimal fit and management daytime garments. Progress toward goals: Ongoing     PLAN OF CARE:   Changes to the plan of care: Pt would benefit from treatment 2x/week in clinic consistently with goal of fitting with custom flat knit compression sleeve/glove upon reduction of limb volumes regarding L UE lymphedema, breast cancer related. Patient initially stated spouse was willing to assist with bandaging, however, patient has not been bandaging at home. Assess fit of Solaris Tribute. Complete measurements for custom daytime compression garments as indicated.    Frequency: 1-2 x/week   Other: karel Zuniga, PT, CLT

## 2018-03-13 ENCOUNTER — HOSPITAL ENCOUNTER (OUTPATIENT)
Dept: PHYSICAL THERAPY | Age: 78
Discharge: HOME | End: 2018-03-13
Payer: MEDICARE

## 2018-03-13 PROCEDURE — G8990 OTHER PT/OT CURRENT STATUS: HCPCS

## 2018-03-13 PROCEDURE — 97140 MANUAL THERAPY 1/> REGIONS: CPT

## 2018-03-13 PROCEDURE — 97530 THERAPEUTIC ACTIVITIES: CPT

## 2018-03-13 PROCEDURE — G8991 OTHER PT/OT GOAL STATUS: HCPCS

## 2018-03-13 NOTE — PROGRESS NOTES
_                                    [facility]  [address] [place] [address] Francisca Crowder [address]      LYmphedema Therapy  G-code 3/13/2018  Visit: 10    [x]        Daily note               [x]       30 day/10th visit progress note    NAME: Stephani Woodard  Date:  3/13/2018    GOALS  Short term goals  Time frame: 2-4 weeks  1. Instruct the patient to be independent with proper skin care to prevent future skin breakdown and decrease the potential risk for infections that are associated with Lymphedema. 2. Patient will be independent with a personal lymphedema exercise program to assist with the lymphatic flow and reduce limb volume L UE by 100-150 mL. 2/9/2018 Limb volumes fluctuating. 2/22/2018 Limb volume reduction noted L UE 89.16 mL from most severe measurements on 2/6/2018. 3/13/2018 rebound in volume noted as below. 3. Patient will understand the signs and symptoms of acute infection. Long term goals  Time frame: 4-8 weeks     1. Patient will have knowledge of the compression options and acquire a safe and                                 appropriate daytime and night time compression system to prevent                                                 re-accumulation of fluid. 2/9/2018 Garments were sorted, with old, ineffective garments discarded with patient's permission. Discussed benefit of custom armsleeve. 3/13/2018 further advisement regarding need for custom daytime compression garments, including custom glove. 2.  Patient or family will be able to don/doff garments independently. Garment                                 system effectiveness will be evaluated prior to discharge for better long-term                                 management and outcomes. 3.  Improvement in LLIS by 2-4 points. 4.   To transition patient to independent restorative phase of CDT.        SUBJECTIVE REPORT:  Pt arrives to treatment with compression bandaging in place, stating she tolerated well. No additional c/o. Pt agreeable to proceeding with MLD and volume measurements today. Pt agreeable to proceeding with measurements for custom garments as indicated based on volume measurements. Pt states she notes RM compression sleeve tight at wrist, with pt agreeable that custom flat knit garments are indicated. Pt agreeable to proceed with treatment. Pain: 1/10, heaviness, skin tightness           Gait:  indep  ADLs:  indep  Treatment Response:  Slight improvement in limb volume measurements today. See scanned graph. Reviewed packet received at evaluation. Function: In compliance with CMSs Claims Based Outcome Reporting, the following G-code set was chosen for this patient based on their primary functional limitation being treated: The outcome measure chosen to determine the severity of the functional limitation was the LLIS with a score of 26/72 which was correlated with the impairment scale. ? Other PT/OT Primary Functional Limitations:     - CURRENT STATUS: CJ - 20%-39% impaired, limited or restricted    - GOAL STATUS: CH - 0% impaired, limited or restricted    - D/C STATUS:  ---------------To be determined---------------               TREATMENT AND OBJECTIVE DATA SUMMARY:       Therapeutic exercise:  Pt to continue the following with assist of therapist:  Hand open/close, wrist flex/ext, wrist circumduction, elbow flex/ext, rowing, paddling, overhead reach L UE x 10. Pt instructed to continue 2x/day with compression bandaging in place. Pt verbalizes and demonstrates good understanding of HEP. Manual Therapy: 9:44-10:48 am  Therapeutic activity:  10:49-11:00 am 64 minutes  11 minutes     Area to decongest: L UE/Upper quadrant. Manual therapy: Resumed MLD sequence as follows:  Stimulated R axillary/L inguinal nodes.   Activated AAA/CLAUDIO/L IA anastomoses ; parasternal, paraspinal, intercostal perforating precollectors. Additional time spent on trunk sequence today. Full UE sequence performed, with fibrotic technique forearm, edema technique. Re-worked anastomoses. Diaphragmatic breathing. Repeated limb volume measurements today, with L UE elevated by 97.43 mL since last visit. Will reassess at second visit this week and measure for compression garments as indicated. Skin intact. Applied low pH lotion L UE using upward strokes. Therapeutic activity: Therapist applied L UE multi-layer compression bandaging as follows:  Finger wraps  Stockinette  Cast padding  Short stretch 4, 6, 8 cm bandaging  Secured with tape/covered with G tubigrip to promote bandaging remaining in place next scheduled appt. Home management of compression bandaging as follows:  Compression bandaging instructions:  1. Compression bandaging will be applied twice a week by therapist in clinic, with adjustments to be made at home as indicated if bandaging becomes loose or uncomfortable. 2. If tolerated, remain bandaged between appointments with therapist, removing under the following conditions--DO NOT WAIT FOR A RETURN PHONE CALL FROM CLINIC:    -Numbness/tingling in extremity different from what you have experienced without the bandages in place.  -Compromise in circulation, monitoring blood refill into toes after applying gentle pressure to toes.  -Onset of pain in extremity that is sudden and severe in nature. -Redness, warmth in limb, and/or fever, flu-like symptoms, which may indicate infection. If this occurs, call your doctor right away. If you note a sudden increase in swelling in extremity, with or without redness/warmth, go to the emergency room as soon as possible to have blood clot ruled out. 3. Compression bandaging supplies that can be laundered are the brown compression wraps and any foam applied for padding. Launder in washer/dryer with NO fabric softener, bleach, woolite. Dry on a low heat. 4. Once compression garments are ordered, compression bandaging will be continued until garments arrive for fitting. This process can take several weeks. TOTAL TREATMENT 76 mins     ASSESSMENT:   Treatment effectiveness and tolerance: Progress has been limited secondary to decreased frequency of treatment with pt traveling, with patient home for a while. Pt advised that, for optimal response to treatment, she will need to be seen 2x/week, and remain bandaged between appts. Pt verbalizes understanding that custom flat knit compression armsleeve/hand piece will likely be indicated for optimal fit and management daytime garments. Progress toward goals: Ongoing     PLAN OF CARE:   Changes to the plan of care: Pt would benefit from treatment 2x/week in clinic consistently with goal of fitting with custom flat knit compression sleeve/glove upon reduction of limb volumes regarding L UE lymphedema, breast cancer related. Patient initially stated spouse was willing to assist with bandaging, however, patient has not been bandaging at home. Assess fit of Solaris Tribute. Complete measurements for custom daytime compression garments as indicated.    Frequency: 1-2 x/week   Other: karel Sal, PT, CLT

## 2018-03-15 ENCOUNTER — HOSPITAL ENCOUNTER (OUTPATIENT)
Dept: MAMMOGRAPHY | Age: 78
Discharge: HOME | End: 2018-03-15
Attending: INTERNAL MEDICINE
Payer: MEDICARE

## 2018-03-15 DIAGNOSIS — Z12.39 SCREENING BREAST EXAMINATION: ICD-10-CM

## 2018-03-15 PROCEDURE — 77062 BREAST TOMOSYNTHESIS BI: CPT

## 2018-03-16 ENCOUNTER — HOSPITAL ENCOUNTER (OUTPATIENT)
Dept: PHYSICAL THERAPY | Age: 78
Discharge: HOME | End: 2018-03-16
Payer: MEDICARE

## 2018-03-16 PROCEDURE — 97530 THERAPEUTIC ACTIVITIES: CPT

## 2018-03-16 PROCEDURE — 97140 MANUAL THERAPY 1/> REGIONS: CPT

## 2018-03-16 NOTE — PROGRESS NOTES
_                                    [facility]  [address] [facility] [address]  Francisca Black 21      LYmphedema Therapy  G-code 3/13/2018  Visit: 11    [x]        Daily note               [x]       30 day/10th visit progress note    NAME: Anabella Godinez  Date:  3/16/2018    GOALS  Short term goals  Time frame: 2-4 weeks  1. Instruct the patient to be independent with proper skin care to prevent future skin breakdown and decrease the potential risk for infections that are associated with Lymphedema. 3/16/2018 goal met. 2. Patient will be independent with a personal lymphedema exercise program to assist with the lymphatic flow and reduce limb volume L UE by 100-150 mL. 2/9/2018 Limb volumes fluctuating. 2/22/2018 Limb volume reduction noted L UE 89.16 mL from most severe measurements on 2/6/2018. 3/13/2018 rebound in volume noted as below. 3/16/2018 ongoing  3. Patient will understand the signs and symptoms of acute infection. 3/16/2018 goal met. Long term goals  Time frame: 4-8 weeks     1. Patient will have knowledge of the compression options and acquire a safe and                                 appropriate daytime and night time compression system to prevent                                                 re-accumulation of fluid. 2/9/2018 Garments were sorted, with old, ineffective garments discarded with patient's permission. Discussed benefit of custom armsleeve. 3/13/2018 further advisement regarding need for custom daytime compression garments, including custom glove. 2.  Patient or family will be able to don/doff garments independently. Garment                                 system effectiveness will be evaluated prior to discharge for better long-term                                 management and outcomes. 3.  Improvement in LLIS by 2-4 points. 4.   To transition patient to independent restorative phase of CDT. SUBJECTIVE REPORT:  Pt arrives to treatment with compression bandaging in place, stating she tolerated well. No additional c/o. Pt agreeable to proceeding with MLD and volume measurements today. Pt states she is seeing progress with lymphedema management, however, forearm/wrist size remaining concerning for her. Pt requests continuing compression bandaging today. Pain: 1/10, heaviness, skin tightness           Gait:  indep  ADLs:  indep  Treatment Response:  Slight improvement in limb volume measurements today. See scanned graph. Reviewed packet received at evaluation. Function: see prior visit            TREATMENT AND OBJECTIVE DATA SUMMARY:       Therapeutic exercise:  Pt to continue the following with assist of therapist:  Hand open/close, wrist flex/ext, wrist circumduction, elbow flex/ext, rowing, paddling, overhead reach L UE x 10. Pt instructed to continue 2x/day with compression bandaging in place. Pt verbalizes and demonstrates good understanding of HEP. Manual Therapy: 9:45-10:44 am  Therapeutic activity:  10:45-11:12 am 59 minutes  11 minutes     Area to decongest: L UE/Upper quadrant. Manual therapy:  MLD sequence as follows:  Stimulated R axillary/L inguinal nodes. Activated AAA/CLAUDIO/L IA anastomoses ; parasternal, paraspinal, intercostal perforating precollectors. Additional time spent on trunk sequence today. Full UE sequence performed, with fibrotic technique forearm, edema technique, additional time spent wrist to elbow. Re-worked anastomoses. Diaphragmatic breathing. Skin intact. Applied low pH lotion L UE using upward strokes. Therapeutic activity: Limb volume measurements performed today with fluctuating volumes persistent. See scanned graph. Note 16.23% limb volume discrepancy, L UE>R UE. Optimal limb volume discrepancy for patient on 6/20/2017 at 8.71%.       Therapist applied L UE multi-layer compression bandaging as follows:  Finger wraps  Stockinette  Cast padding  Short stretch 4, 6, 8 cm bandaging  Secured with tape/covered with G tubigrip to promote bandaging remaining in place next scheduled appt. Home management of compression bandaging as follows:  Compression bandaging instructions:  1. Compression bandaging will be applied twice a week by therapist in clinic, with adjustments to be made at home as indicated if bandaging becomes loose or uncomfortable. 2. If tolerated, remain bandaged between appointments with therapist, removing under the following conditions--DO NOT WAIT FOR A RETURN PHONE CALL FROM CLINIC:    -Numbness/tingling in extremity different from what you have experienced without the bandages in place.  -Compromise in circulation, monitoring blood refill into toes after applying gentle pressure to toes.  -Onset of pain in extremity that is sudden and severe in nature. -Redness, warmth in limb, and/or fever, flu-like symptoms, which may indicate infection. If this occurs, call your doctor right away. If you note a sudden increase in swelling in extremity, with or without redness/warmth, go to the emergency room as soon as possible to have blood clot ruled out. 3. Compression bandaging supplies that can be laundered are the brown compression wraps and any foam applied for padding. Launder in washer/dryer with NO fabric softener, bleach, woolite. Dry on a low heat. 4. Once compression garments are ordered, compression bandaging will be continued until garments arrive for fitting. This process can take several weeks. TOTAL TREATMENT 87 mins     ASSESSMENT:   Treatment effectiveness and tolerance: Progress has been limited secondary to decreased frequency of treatment with pt traveling, with patient home for a while.  Pt advised that, for optimal response to treatment, she will need to be seen 2x/week, and remain bandaged between appts, which has occurred the past 2 weeks, and to continue next week. Pt verbalizes understanding that custom flat knit compression armsleeve/hand piece will likely be indicated for optimal fit and management daytime garments. Progress toward goals: See updated goals. PLAN OF CARE:   Changes to the plan of care: Pt would benefit from treatment 2x/week in clinic consistently with goal of fitting with custom flat knit compression sleeve/glove upon reduction of limb volumes regarding L UE lymphedema, breast cancer related. Patient initially stated spouse was willing to assist with bandaging, however, patient has not been bandaging at home. Assess fit of [employer] Tribute. Complete measurements for custom daytime compression garments as indicated, likely next week.    Frequency: 1-2 x/week   Other: karel Lemus, PT, CLT

## 2018-03-20 ENCOUNTER — HOSPITAL ENCOUNTER (OUTPATIENT)
Dept: PHYSICAL THERAPY | Age: 78
Discharge: HOME | End: 2018-03-20
Payer: MEDICARE

## 2018-03-20 PROCEDURE — 97530 THERAPEUTIC ACTIVITIES: CPT

## 2018-03-20 PROCEDURE — 97140 MANUAL THERAPY 1/> REGIONS: CPT

## 2018-03-20 NOTE — PROGRESS NOTES
_                                    [facility]  [address] [place] [address] Francisca Crowder      LYmphedema Therapy  G-code 3/13/2018  Visit: 11    [x]        Daily note               [x]       30 day/10th visit progress note    NAME: Diana Parham  Date:  3/20/2018    GOALS  Short term goals  Time frame: 2-4 weeks  1. Instruct the patient to be independent with proper skin care to prevent future skin breakdown and decrease the potential risk for infections that are associated with Lymphedema. 3/16/2018 goal met. 2. Patient will be independent with a personal lymphedema exercise program to assist with the lymphatic flow and reduce limb volume L UE by 100-150 mL. 2/9/2018 Limb volumes fluctuating. 2/22/2018 Limb volume reduction noted L UE 89.16 mL from most severe measurements on 2/6/2018. 3/13/2018 rebound in volume noted as below. 3/16/2018 ongoing  3. Patient will understand the signs and symptoms of acute infection. 3/16/2018 goal met. Long term goals  Time frame: 4-8 weeks     1. Patient will have knowledge of the compression options and acquire a safe and                                 appropriate daytime and night time compression system to prevent                                                 re-accumulation of fluid. 2/9/2018 Garments were sorted, with old, ineffective garments discarded with patient's permission. Discussed benefit of custom armsleeve. 3/13/2018 further advisement regarding need for custom daytime compression garments, including custom glove. 3/20/2018 completed measurements for custom flat knit compression sleeve/glove, Juzo 3021.  2.  Patient or family will be able to don/doff garments independently. Garment                                 system effectiveness will be evaluated prior to discharge for better long-term                                 management and outcomes.   3.  Improvement in LLIS by 2-4 points. 4.   To transition patient to independent restorative phase of CDT. SUBJECTIVE REPORT:  Pt arrives to treatment with compression bandaging in place, stating she tolerated well. No additional c/o. Pt agreeable to proceeding with MLD and volume measurements today. Pt states she is seeing progress with lymphedema management, however, is agreeable to proceeding with measurements for custom flat knit compression garments today. Pain: 1/10, heaviness, skin tightness           Gait:  indep  ADLs:  indep  Treatment Response:  Limb volume measurements stabilizing. See scanned graph. Reviewed packet received at evaluation. Function: see prior visit            TREATMENT AND OBJECTIVE DATA SUMMARY:       Therapeutic exercise:  Pt to continue the following with assist of therapist:  Hand open/close, wrist flex/ext, wrist circumduction, elbow flex/ext, rowing, paddling, overhead reach L UE x 10. Pt instructed to continue 2x/day with compression bandaging in place. Pt verbalizes and demonstrates good understanding of HEP. Manual Therapy: 11:15 am-12:14 pm  Therapeutic activity: 12:15-12:46 pm 59 minutes  11 minutes     Area to decongest: L UE/Upper quadrant. Manual therapy:  MLD sequence as follows:  Stimulated R axillary/L inguinal nodes. Activated AAA/CLAUDIO/L IA anastomoses ; parasternal, paraspinal, intercostal perforating precollectors. Additional time spent on trunk sequence today. Full UE sequence performed, with fibrotic technique forearm, edema technique, additional time spent wrist to elbow. Re-worked anastomoses. Diaphragmatic breathing. Limb volume measurements completed, comparable to prior visit. See scanned graph. Skin intact. Applied low pH lotion L UE using upward strokes. Therapeutic activity: Completed measurements for custom flat knit daytime arm sleeve and slightly extended glove.   Pt agreeable to cash payment secondary to Medicare not covering compression garments, with order sent to [facility] due to competitive pricing. Therapist applied L UE multi-layer compression bandaging as follows:  Finger wraps  Stockinette  Cast padding  Short stretch 4, 6, 8 cm bandaging  Secured with tape/covered with G tubigrip to promote bandaging remaining in place next scheduled appt. Home management of compression bandaging as follows:  Compression bandaging instructions:  1. Compression bandaging will be applied twice a week by therapist in clinic, with adjustments to be made at home as indicated if bandaging becomes loose or uncomfortable. 2. If tolerated, remain bandaged between appointments with therapist, removing under the following conditions--DO NOT WAIT FOR A RETURN PHONE CALL FROM CLINIC:    -Numbness/tingling in extremity different from what you have experienced without the bandages in place.  -Compromise in circulation, monitoring blood refill into toes after applying gentle pressure to toes.  -Onset of pain in extremity that is sudden and severe in nature. -Redness, warmth in limb, and/or fever, flu-like symptoms, which may indicate infection. If this occurs, call your doctor right away. If you note a sudden increase in swelling in extremity, with or without redness/warmth, go to the emergency room as soon as possible to have blood clot ruled out. 3. Compression bandaging supplies that can be laundered are the brown compression wraps and any foam applied for padding. Launder in washer/dryer with NO fabric softener, bleach, woolite. Dry on a low heat. 4. Once compression garments are ordered, compression bandaging will be continued until garments arrive for fitting. This process can take several weeks. TOTAL TREATMENT 91 mins     ASSESSMENT:   Treatment effectiveness and tolerance: Progress has been limited secondary to decreased frequency of treatment when treatment initiated, with pt traveling.  Pt advised that, for optimal response to treatment, she will need to be seen 2x/week, and remain bandaged between appts, which has occurred the past 2-3 weeks, and to continue next week. Pt verbalizes understanding that custom flat knit compression armsleeve/hand piece will likely be indicated for optimal fit and management daytime garments, with measurements completed today. Progress toward goals: See updated goals. PLAN OF CARE:   Changes to the plan of care: Pt would benefit from treatment 2x/week in clinic consistently with goal of fitting with custom flat knit compression sleeve/glove upon reduction of limb volumes regarding L UE lymphedema, breast cancer related. Patient initially stated spouse was willing to assist with bandaging, however, patient has not been bandaging at home. Assess fit of [employer] Tribute. Fit daytime compression garments upon receiving, with compression bandaging to continue until compression garments fit.      Frequency: 1-2 x/week   Other: karel Blair, PT, CLT

## 2018-03-23 ENCOUNTER — HOSPITAL ENCOUNTER (OUTPATIENT)
Dept: PHYSICAL THERAPY | Age: 78
Discharge: HOME | End: 2018-03-23
Payer: MEDICARE

## 2018-03-23 PROCEDURE — 97140 MANUAL THERAPY 1/> REGIONS: CPT

## 2018-03-23 PROCEDURE — 97530 THERAPEUTIC ACTIVITIES: CPT

## 2018-03-23 NOTE — PROGRESS NOTES
_                                    [facility]  [address] [place] [address] Francisca Crowder 21      LYmphedema Therapy  G-code 3/13/2018  Visit: 12    [x]        Daily note               [x]       30 day/10th visit progress note    NAME: Brian Ayala  Date:  3/23/2018    GOALS  Short term goals  Time frame: 2-4 weeks  1. Instruct the patient to be independent with proper skin care to prevent future skin breakdown and decrease the potential risk for infections that are associated with Lymphedema. 3/16/2018 goal met. 2. Patient will be independent with a personal lymphedema exercise program to assist with the lymphatic flow and reduce limb volume L UE by 100-150 mL. 2/9/2018 Limb volumes fluctuating. 2/22/2018 Limb volume reduction noted L UE 89.16 mL from most severe measurements on 2/6/2018. 3/13/2018 rebound in volume noted as below. 3/16/2018 ongoing  3. Patient will understand the signs and symptoms of acute infection. 3/16/2018 goal met. Long term goals  Time frame: 4-8 weeks      1. Patient will have knowledge of the compression options and acquire a safe and                                 appropriate daytime and night time compression system to prevent                                                 re-accumulation of fluid. 2/9/2018 Garments were sorted, with old, ineffective garments discarded with patient's permission. Discussed benefit of custom armsleeve. 3/13/2018 further advisement regarding need for custom daytime compression garments, including custom glove. 3/20/2018 completed measurements for custom flat knit compression sleeve/glove, Juzo 3021.  2.  Patient or family will be able to don/doff garments independently. Garment                                 system effectiveness will be evaluated prior to discharge for better long-term                                 management and outcomes.   3.  Improvement in LLIS by 2-4 points. 4.   To transition patient to independent restorative phase of CDT. SUBJECTIVE REPORT:  Pt arrives to treatment with compression bandaging in place, stating she tolerated well. No additional c/o. Pt agreeable to proceeding with MLD and volume measurements today. Pt states she is seeing progress with lymphedema management, however, is agreeable to proceeding with measurements for custom flat knit compression garments today. Pain: 1/10, heaviness, skin tightness           Gait:  indep  ADLs:  indep  Treatment Response:  Limb volume measurements stabilizing. See scanned graph. Reviewed packet received at evaluation. Function: see prior visit            TREATMENT AND OBJECTIVE DATA SUMMARY:       Therapeutic exercise:  Pt to continue the following with assist of therapist:  Hand open/close, wrist flex/ext, wrist circumduction, elbow flex/ext, rowing, paddling, overhead reach L UE x 10. Pt instructed to continue 2x/day with compression bandaging in place. Pt verbalizes and demonstrates good understanding of HEP. Manual Therapy: 10:50-11:52 am  Therapeutic activity: 11:54 am-12:10 pm 62 minutes  15 minutes     Area to decongest: L UE/Upper quadrant. Manual therapy:  MLD sequence as follows:  Stimulated R axillary/L inguinal nodes. Activated AAA/CLAUDIO/L IA anastomoses ; parasternal, paraspinal, intercostal perforating precollectors. Additional time spent on trunk sequence today. Full UE sequence performed, with fibrotic technique forearm, edema technique, additional time spent wrist to elbow. Re-worked anastomoses. Diaphragmatic breathing. Limb volume measurements completed, comparable to prior visit. See scanned graph. Skin intact. Applied low pH lotion L UE using upward strokes.           Therapeutic activity: Therapist applied L UE multi-layer compression bandaging as follows:  Finger wraps  Stockinette  Cast padding  Short stretch 6, 8 cm x 2 cm bandaging  Secured with tape/covered with G tubigrip to promote bandaging remaining in place next scheduled appt. Home management of compression bandaging as follows:  Compression bandaging instructions:  1. Compression bandaging will be applied twice a week by therapist in clinic, with adjustments to be made at home as indicated if bandaging becomes loose or uncomfortable. 2. If tolerated, remain bandaged between appointments with therapist, removing under the following conditions--DO NOT WAIT FOR A RETURN PHONE CALL FROM CLINIC:    -Numbness/tingling in extremity different from what you have experienced without the bandages in place.  -Compromise in circulation, monitoring blood refill into toes after applying gentle pressure to toes.  -Onset of pain in extremity that is sudden and severe in nature. -Redness, warmth in limb, and/or fever, flu-like symptoms, which may indicate infection. If this occurs, call your doctor right away. If you note a sudden increase in swelling in extremity, with or without redness/warmth, go to the emergency room as soon as possible to have blood clot ruled out. 3. Compression bandaging supplies that can be laundered are the brown compression wraps and any foam applied for padding. Launder in washer/dryer with NO fabric softener, bleach, woolite. Dry on a low heat. 4. Once compression garments are ordered, compression bandaging will be continued until garments arrive for fitting. This process can take several weeks. TOTAL TREATMENT 80 mins     ASSESSMENT:   Treatment effectiveness and tolerance: Progress has been limited secondary to decreased frequency of treatment when treatment initiated, with pt traveling. Pt advised that, for optimal response to treatment, she will need to be seen 2x/week, and remain bandaged between appts, which has occurred the past 2-3 weeks, and to continue next week.    Pt verbalizes understanding that custom flat knit compression armsleeve/hand piece will likely be indicated for optimal fit and management daytime garments, with measurements completed today. Compression garments ordered, patient reporting garments paid for. Progress toward goals: See updated goals. PLAN OF CARE:   Changes to the plan of care: Patient initially stated spouse was willing to assist with bandaging, however, patient has not been bandaging at home, unable to self bandage. Assess fit of Solaris Tribute. Fit daytime compression garments upon receiving, with compression bandaging to continue until compression garments fit.      Frequency: 1-2 x/week   Other: karel Mauricio, PT, CLT

## 2018-03-26 ENCOUNTER — HOSPITAL ENCOUNTER (OUTPATIENT)
Dept: PHYSICAL THERAPY | Age: 78
Discharge: HOME | End: 2018-03-26
Payer: MEDICARE

## 2018-03-26 PROCEDURE — 97140 MANUAL THERAPY 1/> REGIONS: CPT

## 2018-03-26 NOTE — PROGRESS NOTES
_                                    [facility]  [address] [place] [address] [facility]      LYmphedema Therapy  G-code 3/13/2018  Visit: 13    [x]        Daily note               [x]       30 day/10th visit progress note    NAME: Andreea Caldwell  Date:  3/26/2018    GOALS  Short term goals  Time frame: 2-4 weeks  1. Instruct the patient to be independent with proper skin care to prevent future skin breakdown and decrease the potential risk for infections that are associated with Lymphedema. 3/16/2018 goal met. 2. Patient will be independent with a personal lymphedema exercise program to assist with the lymphatic flow and reduce limb volume L UE by 100-150 mL. 2/9/2018 Limb volumes fluctuating. 2/22/2018 Limb volume reduction noted L UE 89.16 mL from most severe measurements on 2/6/2018. 3/13/2018 rebound in volume noted as below. 3/16/2018 ongoing  3/26/2018 Reduction by 87.57 mL from most severe level on 3/31/2018. 3. Patient will understand the signs and symptoms of acute infection. 3/16/2018 goal met. Long term goals  Time frame: 4-8 weeks      1. Patient will have knowledge of the compression options and acquire a safe and                                 appropriate daytime and night time compression system to prevent                                                 re-accumulation of fluid. 2/9/2018 Garments were sorted, with old, ineffective garments discarded with patient's permission. Discussed benefit of custom armsleeve. 3/13/2018 further advisement regarding need for custom daytime compression garments, including custom glove. 3/20/2018 completed measurements for custom flat knit compression sleeve/glove, Irena 3021.  2.  Patient or family will be able to don/doff garments independently.   Garment                                 system effectiveness will be evaluated prior to discharge for better long-term management and outcomes. 3.  Improvement in LLIS by 2-4 points. 4.   To transition patient to independent restorative phase of CDT. SUBJECTIVE REPORT:  Pt arrives to treatment with compression bandaging in place, stating she tolerated well. No additional c/o. Pt agreeable to proceeding with MLD and volume measurements today. Pt states she continues to see progress with lymphedema management. Awaiting compression garment arrival.   Pain: 1/10, heaviness, skin tightness           Gait:  indep  ADLs:  indep  Treatment Response:  Limb volume measurements stabilizing. See scanned graph. Reviewed packet received at evaluation. Function: see prior visit            TREATMENT AND OBJECTIVE DATA SUMMARY:       Therapeutic exercise:  Pt to continue the following with assist of therapist:  Hand open/close, wrist flex/ext, wrist circumduction, elbow flex/ext, rowing, paddling, overhead reach L UE x 10. Pt instructed to continue 2x/day with compression bandaging in place. Pt verbalizes and demonstrates good understanding of HEP. Manual Therapy: 9:55-10:58 am 63 minutes     Area to decongest: L UE/Upper quadrant. Manual therapy:  MLD sequence as follows:  Stimulated R axillary/L inguinal nodes. Activated AAA/CLAUDIO/L IA anastomoses ; parasternal, paraspinal, intercostal perforating precollectors. Additional time spent on trunk sequence today. Full UE sequence performed, with fibrotic technique forearm, edema technique, additional time spent wrist to elbow. Re-worked anastomoses. Diaphragmatic breathing. Limb volume measurements completed with overall reduction by 87.57 mL since measurements on 3/13/2018. Skin intact. Applied low pH lotion L UE using upward strokes.           Therapeutic activity: Therapist applied L UE multi-layer compression bandaging as follows:  Finger wraps  Stockinette  Cast padding  Short stretch 4, 6, 8 cm x 2 cm bandaging  Secured with tape/covered with G tubigrip to promote bandaging remaining in place next scheduled appt. Home management of compression bandaging as follows:  Compression bandaging instructions:  1. Compression bandaging will be applied twice a week by therapist in clinic, with adjustments to be made at home as indicated if bandaging becomes loose or uncomfortable. 2. If tolerated, remain bandaged between appointments with therapist, removing under the following conditions--DO NOT WAIT FOR A RETURN PHONE CALL FROM CLINIC:    -Numbness/tingling in extremity different from what you have experienced without the bandages in place.  -Compromise in circulation, monitoring blood refill into toes after applying gentle pressure to toes.  -Onset of pain in extremity that is sudden and severe in nature. -Redness, warmth in limb, and/or fever, flu-like symptoms, which may indicate infection. If this occurs, call your doctor right away. If you note a sudden increase in swelling in extremity, with or without redness/warmth, go to the emergency room as soon as possible to have blood clot ruled out. 3. Compression bandaging supplies that can be laundered are the brown compression wraps and any foam applied for padding. Launder in washer/dryer with NO fabric softener, bleach, woolite. Dry on a low heat. 4. Once compression garments are ordered, compression bandaging will be continued until garments arrive for fitting. This process can take several weeks. TOTAL TREATMENT 63 mins     ASSESSMENT:   Treatment effectiveness and tolerance: Progress has been limited secondary to decreased frequency of treatment when treatment initiated, with pt traveling. Pt advised that, for optimal response to treatment, she will need to be seen 2x/week, and remain bandaged between appts, which has occurred the past 2-3 weeks, and to continue next week.    Pt verbalizes understanding that custom flat knit compression armsleeve/hand piece will likely be indicated for optimal fit and management daytime garments, with measurements completed today. Compression garments ordered, patient reporting garments paid for. Progress toward goals: See updated goals. Awaiting compression garments for fit. PLAN OF CARE:   Changes to the plan of care: Patient initially stated spouse was willing to assist with bandaging, however, patient has not been bandaging at home, unable to self bandage. Assess fit of Solaris Tribute. Fit daytime compression garments upon receiving, with compression bandaging to continue until compression garments fit.      Frequency: 1-2 x/week   Other: karel Weaver, PT, CLT

## 2018-03-29 ENCOUNTER — HOSPITAL ENCOUNTER (OUTPATIENT)
Dept: PHYSICAL THERAPY | Age: 78
Discharge: HOME | End: 2018-03-29
Payer: MEDICARE

## 2018-03-29 PROCEDURE — 97140 MANUAL THERAPY 1/> REGIONS: CPT

## 2018-03-29 NOTE — PROGRESS NOTES
_                                    [facility]  [address] [place] [address] [facility]      LYmphedema Therapy  G-code 3/13/2018  Visit: 14    [x]        Daily note               [x]       30 day/10th visit progress note    NAME: Sanya Blue  Date:  3/29/2018    GOALS  Short term goals  Time frame: 2-4 weeks  1. Instruct the patient to be independent with proper skin care to prevent future skin breakdown and decrease the potential risk for infections that are associated with Lymphedema. 3/16/2018 goal met. 2. Patient will be independent with a personal lymphedema exercise program to assist with the lymphatic flow and reduce limb volume L UE by 100-150 mL. 2/9/2018 Limb volumes fluctuating. 2/22/2018 Limb volume reduction noted L UE 89.16 mL from most severe measurements on 2/6/2018. 3/13/2018 rebound in volume noted as below. 3/16/2018 ongoing  3/26/2018 Reduction by 87.57 mL from most severe level on 3/31/2018. 3. Patient will understand the signs and symptoms of acute infection. 3/16/2018 goal met. Long term goals  Time frame: 4-8 weeks      1. Patient will have knowledge of the compression options and acquire a safe and                                 appropriate daytime and night time compression system to prevent                                                 re-accumulation of fluid. 2/9/2018 Garments were sorted, with old, ineffective garments discarded with patient's permission. Discussed benefit of custom armsleeve. 3/13/2018 further advisement regarding need for custom daytime compression garments, including custom glove. 3/20/2018 completed measurements for custom flat knit compression sleeve/glove, Irena 3021.  2.  Patient or family will be able to don/doff garments independently.   Garment                                 system effectiveness will be evaluated prior to discharge for better long-term management and outcomes. 3.  Improvement in LLIS by 2-4 points. 4.   To transition patient to independent restorative phase of CDT. SUBJECTIVE REPORT:  Pt arrives to treatment with compression bandaging in place, stating she tolerated well. No additional c/o. Pt agreeable to proceeding with MLD and volume measurements today. Pt states she continues to see progress with lymphedema management. Awaiting compression garment arrival.   Pain: 1/10, heaviness, skin tightness           Gait:  indep  ADLs:  indep  Treatment Response:  Limb volume measurements stabilizing. See scanned graph. Reviewed packet received at evaluation. Function: see prior visit  TREATMENT AND OBJECTIVE DATA SUMMARY:       Therapeutic exercise:  Pt to continue the following with assist of therapist:  Hand open/close, wrist flex/ext, wrist circumduction, elbow flex/ext, rowing, paddling, overhead reach L UE x 10. Pt instructed to continue 2x/day with compression bandaging in place. Pt verbalizes and demonstrates good understanding of HEP. Manual Therapy: 9:45-10:48 am 63 minutes     Area to decongest: L UE/Upper quadrant. Manual therapy:  MLD sequence as follows:  Stimulated R axillary/L inguinal nodes. Activated AAA/CLAUDIO/L IA anastomoses ; parasternal, paraspinal, intercostal perforating precollectors. Additional time spent on trunk sequence today. Full UE sequence performed, with fibrotic technique forearm, edema technique, additional time spent wrist to elbow. Re-worked anastomoses. Diaphragmatic breathing. See scanned limb volume graph. Skin intact. Applied low pH lotion L UE using upward strokes.           Therapeutic activity: Therapist applied L UE multi-layer compression bandaging as follows:  Finger wraps  Stockinette  Cast padding  Short stretch 6, 8 cm x 2 cm bandaging  Secured with tape/covered with G tubigrip to promote bandaging remaining in place next scheduled appt. Home management of compression bandaging as follows:  Compression bandaging instructions:  1. Compression bandaging will be applied twice a week by therapist in clinic, with adjustments to be made at home as indicated if bandaging becomes loose or uncomfortable. 2. If tolerated, remain bandaged between appointments with therapist, removing under the following conditions--DO NOT WAIT FOR A RETURN PHONE CALL FROM CLINIC:    -Numbness/tingling in extremity different from what you have experienced without the bandages in place.  -Compromise in circulation, monitoring blood refill into toes after applying gentle pressure to toes.  -Onset of pain in extremity that is sudden and severe in nature. -Redness, warmth in limb, and/or fever, flu-like symptoms, which may indicate infection. If this occurs, call your doctor right away. If you note a sudden increase in swelling in extremity, with or without redness/warmth, go to the emergency room as soon as possible to have blood clot ruled out. 3. Compression bandaging supplies that can be laundered are the brown compression wraps and any foam applied for padding. Launder in washer/dryer with NO fabric softener, bleach, woolite. Dry on a low heat. 4. Once compression garments are ordered, compression bandaging will be continued until garments arrive for fitting. This process can take several weeks. TOTAL TREATMENT 63 mins     ASSESSMENT:   Treatment effectiveness and tolerance: Progressing slowly, with overall limb volume discrepancy improved to 13.95%. Awaiting compression garments, daytime custom, fit fit. Pt verbalizes understanding that custom flat knit compression armsleeve/hand piece are indicated for optimal management of lymphedema. Compression garments ordered, patient reporting garments paid for, awaiting delivery. Progress toward goals: See updated goals. Awaiting compression garments for fit.      PLAN OF CARE: Changes to the plan of care: Patient states her spouse is willing to assist with bandaging while they are out of town. Assess fit of Solaris Tribute. Fit daytime compression garments upon receiving, with compression bandaging to continue until compression garments fit.      Frequency: 1-2 x/week   Other: karel Bhatti, PT, CLT

## 2018-04-06 ENCOUNTER — HOSPITAL ENCOUNTER (OUTPATIENT)
Dept: PHYSICAL THERAPY | Age: 78
Discharge: HOME | End: 2018-04-06
Payer: MEDICARE

## 2018-04-06 PROCEDURE — 97760 ORTHOTIC MGMT&TRAING 1ST ENC: CPT

## 2018-04-06 PROCEDURE — 97140 MANUAL THERAPY 1/> REGIONS: CPT

## 2018-04-06 NOTE — PROGRESS NOTES
_                                    [facility]  [address] [facility]  Francisca Black      LYmphedema Therapy  G-code 3/13/2018  Visit: 15    [x]        Daily note               [x]       30 day/10th visit progress note    NAME: Michael Davenport  Date:  4/6/2018    GOALS  Short term goals  Time frame: 2-4 weeks  1. Instruct the patient to be independent with proper skin care to prevent future skin breakdown and decrease the potential risk for infections that are associated with Lymphedema. 3/16/2018 goal met. 2. Patient will be independent with a personal lymphedema exercise program to assist with the lymphatic flow and reduce limb volume L UE by 100-150 mL. 2/9/2018 Limb volumes fluctuating. 2/22/2018 Limb volume reduction noted L UE 89.16 mL from most severe measurements on 2/6/2018. 3/13/2018 rebound in volume noted as below. 3/16/2018 ongoing  3/26/2018 Reduction by 87.57 mL from most severe level on 3/31/2018. 4/6/2018 see limb volume measurements below. 3. Patient will understand the signs and symptoms of acute infection. 3/16/2018 goal met. Long term goals  Time frame: 4-8 weeks      1. Patient will have knowledge of the compression options and acquire a safe and                                 appropriate daytime and night time compression system to prevent                                                 re-accumulation of fluid. 2/9/2018 Garments were sorted, with old, ineffective garments discarded with patient's permission. Discussed benefit of custom armsleeve. 3/13/2018 further advisement regarding need for custom daytime compression garments, including custom glove. 3/20/2018 completed measurements for custom flat knit compression sleeve/glove, Irena 3021. 4/6/2018 fit custom flat knit compression garments  2. Patient or family will be able to don/doff garments independently.   Garment system effectiveness will be evaluated prior to discharge for better long-term                                 management and outcomes. 3.  Improvement in LLIS by 2-4 points. 4.   To transition patient to independent restorative phase of CDT. SUBJECTIVE REPORT:  Pt arrives to treatment with Katja Zanemarivel in place, stating she removed compression bandaging when out of town this week. Has received daytime custom flat knit compression garments, to be fit today. Pain: 1/10, heaviness, skin tightness           Gait:  indep  ADLs:  indep  Treatment Response:  Limb volume measurements stabilizing. See scanned graph. Reviewed packet received at evaluation. Function: see prior visit  TREATMENT AND OBJECTIVE DATA SUMMARY:       Therapeutic exercise:  Pt to continue the following with assist of therapist:  Hand open/close, wrist flex/ext, wrist circumduction, elbow flex/ext, rowing, paddling, overhead reach L UE x 10. Pt instructed to continue 2x/day with compression bandaging in place. Pt verbalizes and demonstrates good understanding of HEP. Manual Therapy: 11:10-11:27 am  Orthotic management: 11:30-12:12 pm 17 minutes  42 minutes     Area to decongest: L UE/Upper quadrant. Manual therapy:  Limb volume measurements completed. Note rebound in limb volume discrepancy, up to 16.04% today. Likely due to need to remove compression bandaging while out of town, and spouse having difficulty reapplying bandaging. Note fullness dorsal hand, mild. See scanned graph. Skin intact. Applied low pH lotion L UE using upward strokes. Orthotic management: Therapist fit custom flat knit Juzo [id number] sleeve/glove, with garments donned and monitored. Note wrist slightly tight, however, patient not at baseline due to bandaging removal required prior to visit and spouse unable to effectively reapply compression bandaging.   Garments removed, with Tribute night time garment fit assessed, with alteration allowed within 1 year. Note areas that garment are not providing adequate compression, with garment clipped and marked, RA requested to allow garment to be sent for alteration, and fit upon receiving. Therapist applied L UE multi-layer compression bandaging as follows:  Finger wraps  Stockinette  Cast padding  Short stretch 6 x 2, 8 cm cm bandaging  Secured with tape/covered with G tubigrip to promote bandaging remaining in place next scheduled appt. Home management of compression bandaging as follows:  Compression bandaging instructions:  1. Compression bandaging will be applied twice a week by therapist in clinic, with adjustments to be made at home as indicated if bandaging becomes loose or uncomfortable. 2. If tolerated, remain bandaged between appointments with therapist, removing under the following conditions--DO NOT WAIT FOR A RETURN PHONE CALL FROM CLINIC:    -Numbness/tingling in extremity different from what you have experienced without the bandages in place.  -Compromise in circulation, monitoring blood refill into toes after applying gentle pressure to toes.  -Onset of pain in extremity that is sudden and severe in nature. -Redness, warmth in limb, and/or fever, flu-like symptoms, which may indicate infection. If this occurs, call your doctor right away. If you note a sudden increase in swelling in extremity, with or without redness/warmth, go to the emergency room as soon as possible to have blood clot ruled out. 3. Compression bandaging supplies that can be laundered are the brown compression wraps and any foam applied for padding. Launder in washer/dryer with NO fabric softener, bleach, woolite. Dry on a low heat. 4. Once compression garments are ordered, compression bandaging will be continued until garments arrive for fitting. This process can take several weeks.              TOTAL TREATMENT 62 mins     ASSESSMENT:   Treatment effectiveness and tolerance: Progressing slowly, with overall limb volume discrepancy rebounding to 16.04%, up from 13.95% previous measurements. Flor Montgomery marked and sent for alteration to improve effectiveness of night time compression. Bandaging reapplied today secondary to limb volume elevated and optimal fit of compression sleeve, at wrist, not attained as noted above. Pt advised to remove bandaging on Sunday morning, apply daytime custom flat knit compression garments for daytime wear, perform pump sequence daily, and return for treatment next week. Pt verbalizes understanding that custom flat knit compression armsleeve/hand piece are indicated for optimal management of lymphedema, to report response to wear prior to next scheduled appt. Progress toward goals: See updated goals. Awaiting night time Tribute alteration. Acceptable fit of daytime custom flat knit compression garments, with fit to be reassessed next week. PLAN OF CARE:   Changes to the plan of care: Reassess fit of daytime compression garments next scheduled visit, with need to continue compression bandaging a possibility until night time garment is received for fit after alteration. Consider home bandaging at night as possible.    Frequency: 1-2 x/week   Other: karel Ken Phi, PT, CLT

## 2018-04-09 ENCOUNTER — HOSPITAL ENCOUNTER (OUTPATIENT)
Dept: PHYSICAL THERAPY | Age: 78
Discharge: HOME | End: 2018-04-09
Payer: MEDICARE

## 2018-04-09 PROCEDURE — 97016 VASOPNEUMATIC DEVICE THERAPY: CPT

## 2018-04-09 PROCEDURE — 97140 MANUAL THERAPY 1/> REGIONS: CPT

## 2018-04-09 NOTE — PROGRESS NOTES
_                                    [facility]  [address] [place] [address] [facility] [address]      LYmphedema Therapy  G-code 3/13/2018  Visit: 16    [x]        Daily note               [x]       30 day/10th visit progress note    NAME: Brittani Wilcox  Date:  4/9/2018    GOALS  Short term goals  Time frame: 2-4 weeks  1. Instruct the patient to be independent with proper skin care to prevent future skin breakdown and decrease the potential risk for infections that are associated with Lymphedema. 3/16/2018 goal met. 2. Patient will be independent with a personal lymphedema exercise program to assist with the lymphatic flow and reduce limb volume L UE by 100-150 mL. 2/9/2018 Limb volumes fluctuating. 2/22/2018 Limb volume reduction noted L UE 89.16 mL from most severe measurements on 2/6/2018. 3/13/2018 rebound in volume noted as below. 3/16/2018 ongoing  3/26/2018 Reduction by 87.57 mL from most severe level on 3/31/2018. 4/6/2018 see limb volume measurements below. 3. Patient will understand the signs and symptoms of acute infection. 3/16/2018 goal met. Long term goals  Time frame: 4-8 weeks      1. Patient will have knowledge of the compression options and acquire a safe and                                 appropriate daytime and night time compression system to prevent                                                 re-accumulation of fluid. 2/9/2018 Garments were sorted, with old, ineffective garments discarded with patient's permission. Discussed benefit of custom armsleeve. 3/13/2018 further advisement regarding need for custom daytime compression garments, including custom glove. 3/20/2018 completed measurements for custom flat knit compression sleeve/glove, Irena 3021. 4/6/2018 fit custom flat knit compression garments  2. Patient or family will be able to don/doff garments independently.   Garment system effectiveness will be evaluated prior to discharge for better long-term                                 management and outcomes. 3.  Improvement in LLIS by 2-4 points. 4.   To transition patient to independent restorative phase of CDT. SUBJECTIVE REPORT:  Pt arrives to treatment with compression bandaging in place. Patient states she is tolerating bandaging well, and agreeable to proceeding with treatment. Pain: 1/10, heaviness, skin tightness           Gait:  indep  ADLs:  indep  Treatment Response:  Limb volume measurements stabilizing. See scanned graph. Reviewed packet received at evaluation. Function: see prior visit  TREATMENT AND OBJECTIVE DATA SUMMARY:       Therapeutic exercise:  Pt to continue the following with assist of therapist:  Hand open/close, wrist flex/ext, wrist circumduction, elbow flex/ext, rowing, paddling, overhead reach L UE x 10. Pt instructed to continue 2x/day with compression bandaging in place. Pt verbalizes and demonstrates good understanding of HEP. Manual Therapy: 1:20-2:00 pm  Vasopneumatic pump: 12:30-1:15 pm 40 minutes  45 minutes     Area to decongest: L UE/Upper quadrant. Vasopneumatic pump: L UE arm sequence using Flexitouch in clinic with patient tolerating well. Manual therapy: Limb volume measurements completed L UE with 18%+ limb volume discrepancy noted. Patient requires day/night time compression. Night time compression garment has been returned for alteration, with spouse to assist patient with night time bandaging when transitioned to daytime garments. Due to limb volume discrepancy, decision made to continue compression bandaging application, with limb volumes to be repeated upon arrival to next scheduled appt 4/12/2018. Kinesiotape applied dorsal hand/forearm, fan technique, with anchor posterior proximal forearm.     Therapist applied L UE multi-layer compression bandaging as follows:  Finger wraps  Stockinette  Komprex II pads applied volar and dorsal aspect of forearm  Cast padding  Short stretch 6 x 1, 8 x 2 cm cm bandaging  Secured with tape/covered with G tubigrip to promote bandaging remaining in place next scheduled appt. Home management of compression bandaging as follows:  Compression bandaging instructions:  1. Compression bandaging will be applied twice a week by therapist in clinic, with adjustments to be made at home as indicated if bandaging becomes loose or uncomfortable. 2. If tolerated, remain bandaged between appointments with therapist, removing under the following conditions--DO NOT WAIT FOR A RETURN PHONE CALL FROM CLINIC:    -Numbness/tingling in extremity different from what you have experienced without the bandages in place.  -Compromise in circulation, monitoring blood refill into toes after applying gentle pressure to toes.  -Onset of pain in extremity that is sudden and severe in nature. -Redness, warmth in limb, and/or fever, flu-like symptoms, which may indicate infection. If this occurs, call your doctor right away. If you note a sudden increase in swelling in extremity, with or without redness/warmth, go to the emergency room as soon as possible to have blood clot ruled out. 3. Compression bandaging supplies that can be laundered are the brown compression wraps and any foam applied for padding. Launder in washer/dryer with NO fabric softener, bleach, woolite. Dry on a low heat. 4. Once compression garments are ordered, compression bandaging will be continued until garments arrive for fitting. This process can take several weeks. TOTAL TREATMENT 90 mins     ASSESSMENT:   Treatment effectiveness and tolerance: Progressing slowly, with overall limb volume discrepancy rebounding to 18% from 16.04% previous measurements. Carlos Velázquez marked and sent for alteration prior visit. Bandaging reapplied today secondary to limb volume elevated.   Pt advised to remain bandaged until next scheduled appt. Pt verbalizes understanding that custom flat knit compression armsleeve/hand piece are indicated for optimal management of lymphedema, to report response to wear prior to next scheduled appt. Progress toward goals: See updated goals. Awaiting night time Tribute alteration. Acceptable fit of daytime custom flat knit compression garments, with fit to be reassessed next visit. PLAN OF CARE:   Changes to the plan of care: Reassess fit of daytime compression garments next scheduled visit, with need to continue compression bandaging a possibility until night time garment is received for fit after alteration. Consider home bandaging at night as possible.    Frequency: 1-2 x/week   Other: karel Garrido, PT, CLT

## 2018-04-12 ENCOUNTER — HOSPITAL ENCOUNTER (OUTPATIENT)
Dept: PHYSICAL THERAPY | Age: 78
Discharge: HOME | End: 2018-04-12
Payer: MEDICARE

## 2018-04-12 PROCEDURE — 97140 MANUAL THERAPY 1/> REGIONS: CPT

## 2018-04-12 PROCEDURE — 97016 VASOPNEUMATIC DEVICE THERAPY: CPT

## 2018-04-12 NOTE — PROGRESS NOTES
_                                    [facility]  [address] [place] [address]  Francisca Black      LYmphedema Therapy  G-code 3/13/2018  Visit: 17    [x]        Daily note               [x]       30 day/10th visit progress note    NAME: Eduardo Serrano  Date:  4/12/2018    GOALS  Short term goals  Time frame: 2-4 weeks  1. Instruct the patient to be independent with proper skin care to prevent future skin breakdown and decrease the potential risk for infections that are associated with Lymphedema. 3/16/2018 goal met. 2. Patient will be independent with a personal lymphedema exercise program to assist with the lymphatic flow and reduce limb volume L UE by 100-150 mL. 2/9/2018 Limb volumes fluctuating. 2/22/2018 Limb volume reduction noted L UE 89.16 mL from most severe measurements on 2/6/2018. 3/13/2018 rebound in volume noted as below. 3/16/2018 ongoing  3/26/2018 Reduction by 87.57 mL from most severe level on 3/31/2018. 4/6/2018 see limb volume measurements below. 3. Patient will understand the signs and symptoms of acute infection. 3/16/2018 goal met. Long term goals  Time frame: 4-8 weeks      1. Patient will have knowledge of the compression options and acquire a safe and                                 appropriate daytime and night time compression system to prevent                                                 re-accumulation of fluid. 2/9/2018 Garments were sorted, with old, ineffective garments discarded with patient's permission. Discussed benefit of custom armsleeve. 3/13/2018 further advisement regarding need for custom daytime compression garments, including custom glove. 3/20/2018 completed measurements for custom flat knit compression sleeve/glove, Jasminazo 3021. 4/6/2018 fit custom flat knit compression garments  2. Patient or family will be able to don/doff garments independently.   Garment system effectiveness will be evaluated prior to discharge for better long-term                                 management and outcomes. 3.  Improvement in LLIS by 2-4 points. 4.   To transition patient to independent restorative phase of CDT. SUBJECTIVE REPORT:  Pt arrives to treatment with compression bandaging in place. Patient states she is tolerating bandaging well, and agreeable to proceeding with treatment. Pain: 1/10, heaviness, skin tightness           Gait:  indep  ADLs:  indep  Treatment Response:  Limb volume measurements stabilizing. See scanned graph. Reviewed packet received at evaluation. Function: see prior visit  TREATMENT AND OBJECTIVE DATA SUMMARY:       Therapeutic exercise:  Pt to continue the following with assist of therapist:  Hand open/close, wrist flex/ext, wrist circumduction, elbow flex/ext, rowing, paddling, overhead reach L UE x 10. Pt instructed to continue 2x/day with compression bandaging in place. Pt verbalizes and demonstrates good understanding of HEP. Manual Therapy: 12:00-12:28 pm  Vasopneumatic pump: 11:00-11:50 pm 28 minutes  50 minutes     Area to decongest: L UE/Upper quadrant. Vasopneumatic pump: L UE arm sequence using Flexitouch in clinic with patient tolerating well. Manual therapy: Limb volume measurements completed L UE with 16.71% limb volume discrepancy noted. Patient requires day/night time compression. Night time compression garment has been returned for alteration, with spouse to assist patient with night time bandaging when transitioned to daytime garments. Due to limb volume discrepancy, decision made to continue compression bandaging application, with daughter to remove bandaging at home to learn technique, for night time application until altered night time garment arrives.   Pt will transition to wear of daytime compression garments upon daughter feeling comfortable with compression bandaging application at night.    Assessed fit of custom flat knit compression garments, with good fit noted. Kinesiotape left in place as applied dorsal hand/forearm, fan technique, with anchor posterior proximal forearm. Therapist applied L UE multi-layer compression bandaging as follows:  Finger wraps  Stockinette  Komprex II pads applied volar and dorsal aspect of forearm  Cast padding  Short stretch 6, 8, 10 cm bandaging  Secured with tape/covered with G tubigrip to promote bandaging remaining in place next scheduled appt. Home management of compression bandaging as follows:  Compression bandaging instructions:  1. Compression bandaging will be applied twice a week by therapist in clinic, with adjustments to be made at home as indicated if bandaging becomes loose or uncomfortable. 2. If tolerated, remain bandaged between appointments with therapist, removing under the following conditions--DO NOT WAIT FOR A RETURN PHONE CALL FROM CLINIC:    -Numbness/tingling in extremity different from what you have experienced without the bandages in place.  -Compromise in circulation, monitoring blood refill into toes after applying gentle pressure to toes.  -Onset of pain in extremity that is sudden and severe in nature. -Redness, warmth in limb, and/or fever, flu-like symptoms, which may indicate infection. If this occurs, call your doctor right away. If you note a sudden increase in swelling in extremity, with or without redness/warmth, go to the emergency room as soon as possible to have blood clot ruled out. 3. Compression bandaging supplies that can be laundered are the brown compression wraps and any foam applied for padding. Launder in washer/dryer with NO fabric softener, bleach, woolite. Dry on a low heat. 4. Once compression garments are ordered, compression bandaging will be continued until garments arrive for fitting. This process can take several weeks.              TOTAL TREATMENT 88 mins     ASSESSMENT: Treatment effectiveness and tolerance: Progressing slowly, with overall limb volume discrepancy rebounding to 16.71%. Nelida Haile marked and sent for alteration prior visit. Bandaging reapplied today secondary to limb volume elevated. Pt advised to have daughter remove bandaging to allow her to see technique, with patient to transition to daytime garments either when daughter is comfortable with bandaging or when altered night time garment arrives. Pt verbalizes understanding that custom flat knit compression armsleeve/hand piece are indicated for optimal management of lymphedema, to report response to wear prior to next scheduled appt. Progress toward goals: See updated goals. Awaiting night time Tribute alteration. Acceptable fit of daytime custom flat knit compression garments, with fit to be reassessed next visit. PLAN OF CARE:   Changes to the plan of care: Reassess fit of daytime compression garments next scheduled visit, with need to continue compression bandaging a possibility until night time garment is received for fit after alteration. Consider home bandaging at night as possible.    Frequency: 1-2 x/week   Other: karel Gama, PT, CLT

## 2018-04-15 RX ORDER — GABAPENTIN 300 MG/1
CAPSULE ORAL
Qty: 240 CAP | Refills: 0 | Status: SHIPPED | OUTPATIENT
Start: 2018-04-15 | End: 2018-05-14 | Stop reason: SDUPTHER

## 2018-04-16 ENCOUNTER — HOSPITAL ENCOUNTER (OUTPATIENT)
Dept: PHYSICAL THERAPY | Age: 78
Discharge: HOME | End: 2018-04-16
Payer: MEDICARE

## 2018-04-16 PROCEDURE — 97140 MANUAL THERAPY 1/> REGIONS: CPT

## 2018-04-16 PROCEDURE — G8990 OTHER PT/OT CURRENT STATUS: HCPCS

## 2018-04-16 PROCEDURE — G8991 OTHER PT/OT GOAL STATUS: HCPCS

## 2018-04-16 NOTE — PROGRESS NOTES
_                                    [facility]  [address] [place] [address],[address]  [address] [place] [address]      LYmphedema Therapy  G-code 4/16/2018  Visit: 19    [x]        Daily note               [x]       30 day/10th visit progress note    NAME: Dionne Pelletier  Date:  4/12/2018    GOALS  Short term goals  Time frame: 2-4 weeks  1. Instruct the patient to be independent with proper skin care to prevent future skin breakdown and decrease the potential risk for infections that are associated with Lymphedema. 3/16/2018 goal met. 2. Patient will be independent with a personal lymphedema exercise program to assist with the lymphatic flow and reduce limb volume L UE by 100-150 mL. 2/9/2018 Limb volumes fluctuating. 2/22/2018 Limb volume reduction noted L UE 89.16 mL from most severe measurements on 2/6/2018. 3/13/2018 rebound in volume noted as below. 3/16/2018 ongoing  3/26/2018 Reduction by 87.57 mL from most severe level on 3/31/2018. 4/6/2018 see limb volume measurements below. 3. Patient will understand the signs and symptoms of acute infection. 3/16/2018 goal met. Long term goals  Time frame: 4-8 weeks      1. Patient will have knowledge of the compression options and acquire a safe and                                 appropriate daytime and night time compression system to prevent                                                 re-accumulation of fluid. 2/9/2018 Garments were sorted, with old, ineffective garments discarded with patient's permission. Discussed benefit of custom armsleeve. 3/13/2018 further advisement regarding need for custom daytime compression garments, including custom glove. 3/20/2018 completed measurements for custom flat knit compression sleeve/glove, Irena 3021. 4/6/2018 fit custom flat knit compression garments.   4/16/2018 transitioned to daytime compression garment wear, night time compression bandaging with assistance of family. 2.  Patient or family will be able to don/doff garments independently. Garment                                 system effectiveness will be evaluated prior to discharge for better long-term                                 management and outcomes. 4/16/2018 EZ slide donning aid ordered, with patient provided with un-used clinic sample to use until hers arrives. 3.  Improvement in LLIS by 2-4 points. 4/16/2018 ongoing. 4.   To transition patient to independent restorative phase of CDT. SUBJECTIVE REPORT:  Pt arrives to treatment with compression sleeve/glove, stating she was unable to don sleeve today without the EZ slide donning aid. Patient reports she and spouse have been applying compression bandaging at night without c/o. Pain: 1/10, heaviness, skin tightness           Gait:  indep  ADLs:  indep  Treatment Response:  Limb volume measurements stabilizing. See scanned graph. Reviewed packet received at evaluation. Function: In compliance with CMSs Claims Based Outcome Reporting, the following G-code set was chosen for this patient based on their primary functional limitation being treated: The outcome measure chosen to determine the severity of the functional limitation was the LLIS with a score of 15/72 which was correlated with the impairment scale. ? Other PT/OT Primary Functional Limitations:     - CURRENT STATUS: CI - 1%-19% impaired, limited or restricted    - GOAL STATUS: CH - 0% impaired, limited or restricted    - D/C STATUS:  ---------------To be determined---------------           TREATMENT AND OBJECTIVE DATA SUMMARY:       Therapeutic exercise:  Pt to continue the following with assist of therapist:  Hand open/close, wrist flex/ext, wrist circumduction, elbow flex/ext, rowing, paddling, overhead reach L UE x 10. Pt instructed to continue 2x/day with compression bandaging in place. Pt verbalizes and demonstrates good understanding of HEP. Manual Therapy: 11:10 am-12:10 pm 60 minutes     Area to decongest: L UE/Upper quadrant. Manual therapy:           Limb volume measurements completed L UE with 17.79% limb volume discrepancy noted. Hand involvement resolved. Patient requires day/night time compression. Night time compression garment has been returned for alteration, with spouse to assist patient with night time bandaging when transitioned to daytime garments. Due to limb volume discrepancy, decision made to continue compression bandaging application at night until time garment returned to patient. Patient transitioned to wear of compression sleeve/glove during daytime hours, to continue compression bandaging at night. Assessed fit of custom flat knit compression garments, with good fit noted. Assess response to transition to compression garments, with pt to resume wearing Tribute upon receiving, applying short stretch bandaging technique over Tribute if more compression required at night for management of lymphedema. Home management to continue as follows:  Compression garment routine:  1. Apply daytime compression stocking to clean, dry extremity first thing in the morning, EVERY MORNING. 2. Wear garment until bedtime, adjusting throughout the day as needed should garment wrinkle or crease. Problem areas can be at joint, and top of garment, ensuring proximal aspect of garment is positioned appropriately on extremity. 3. Launder compression garments nightly either by hand or washing machine in a garment bag or pillowcase. Use mild detergent with NO FABRIC SOFTENER, NO BLEACH, NO WOOLITE. Wash in cool/warm water. 4. Dry garment in dryer on low heat right side out in garment bag or pillowcase. 5. Daytime compression garments are NOT safe to sleep in, so remove at bedtime.     6. Perform skin care to extremity, cleansing skin daily with soap and water, and using low pH lotion, upward strokes to stimulate lymphatic vessels. 7. Apply night time compression garment as instructed to wear while sleeping, adjusting throughout the night as needed for comfort. 8. Perform exercise program with daytime compression stockings on. Signs/Symptoms of infection include:  Fever, flu-like symptoms, pain/redness/warmth in extremity, sometimes with increase in swelling present. Stop wearing your compression garment if this occurs. Call your doctor immediately or go to the Emergency room to address potential infection. Remove compression with changes in circulation or numbness in extremity, different from what you may experience when not wearing compression garment, pain, unexplained shortness of breath. Continue daily use of vasopneumatic pump for 1 hour, adding a second session as needed, with 6 hours minimum between use. TOTAL TREATMENT 60 mins     ASSESSMENT:   Treatment effectiveness and tolerance: Progressing slowly, with overall limb volume discrepancy 17.79%. Corinda Foots marked and sent for alteration prior visit, however, heard back from [employer], who state they will not alter garment due to garment appearing dirty. Therapist noted garment to be worn, but not soiled prior to marking for alteration. Garment to be returned to patient ASAP, with decision will be made regarding benefit of laundering garment and returning it for alterations vs applying short stretch bandaging over current garment for night time compression. Transitioned to daytime compression garment wear, pump use, night time compression bandaging until night time garment received for night time wear. Progress toward goals: See updated goals. Awaiting night time Tribute alteration. Acceptable fit of daytime custom flat knit compression garments, with fit to be reassessed next visit.      PLAN OF CARE:   Changes to the plan of care: Reassess fit of daytime compression garments next scheduled visit, with need to continue compression bandaging a possibility until night time garment is received for wear. Continue daily vasopneumatic pump use, daily skin care, and HEP 2x/day with compression garments in place.    Frequency: prn   Other: karel Valderrama PT, CLT

## 2018-05-02 ENCOUNTER — HOSPITAL ENCOUNTER (OUTPATIENT)
Dept: PHYSICAL THERAPY | Age: 78
Discharge: HOME | End: 2018-05-02
Payer: MEDICARE

## 2018-05-02 PROCEDURE — G8991 OTHER PT/OT GOAL STATUS: HCPCS

## 2018-05-02 PROCEDURE — G8990 OTHER PT/OT CURRENT STATUS: HCPCS

## 2018-05-02 PROCEDURE — 97140 MANUAL THERAPY 1/> REGIONS: CPT

## 2018-05-02 PROCEDURE — G8992 OTHER PT/OT  D/C STATUS: HCPCS

## 2018-05-02 PROCEDURE — 97760 ORTHOTIC MGMT&TRAING 1ST ENC: CPT

## 2018-05-02 NOTE — PROGRESS NOTES
_                                    [facility]  [address] [place] [address],[address]  Francisca Black      LYmphedema Therapy  G-code 4/16/2018  Visit: 20    [x]        Daily note               [x]       Discharge    NAME: Yari Stratton  Date: 5/2/2018    GOALS  Short term goals  Time frame: 2-4 weeks  1. Instruct the patient to be independent with proper skin care to prevent future skin breakdown and decrease the potential risk for infections that are associated with Lymphedema. 3/16/2018 goal met. 2. Patient will be independent with a personal lymphedema exercise program to assist with the lymphatic flow and reduce limb volume L UE by 100-150 mL. 2/9/2018 Limb volumes fluctuating. 2/22/2018 Limb volume reduction noted L UE 89.16 mL from most severe measurements on 2/6/2018. 3/13/2018 rebound in volume noted as below. 3/16/2018 ongoing  3/26/2018 Reduction by 87.57 mL from most severe level on 3/31/2018. 4/6/2018 see limb volume measurements below. 5/2/2018 reduced by 128.81mL since evaluation 1/2018. Goal met. 3. Patient will understand the signs and symptoms of acute infection. 3/16/2018 goal met. Long term goals  Time frame: 4-8 weeks      1. Patient will have knowledge of the compression options and acquire a safe and                                 appropriate daytime and night time compression system to prevent                                                 re-accumulation of fluid. 2/9/2018 Garments were sorted, with old, ineffective garments discarded with patient's permission. Discussed benefit of custom armsleeve. 3/13/2018 further advisement regarding need for custom daytime compression garments, including custom glove. 3/20/2018 completed measurements for custom flat knit compression sleeve/glove, [address]21. 4/6/2018 fit custom flat knit compression garments.   4/16/2018 transitioned to daytime compression garment wear, night time compression bandaging with assistance of family. 5/2/2018 goal met. 2.  Patient or family will be able to don/doff garments independently. Garment                                 system effectiveness will be evaluated prior to discharge for better long-term                                 management and outcomes. 4/16/2018 EZ slide donning aid ordered, with patient provided with un-used clinic sample to use until hers arrives. 5/2/2018 goal met. 3.  Improvement in LLIS by 2-4 points. 4/16/2018 ongoing. 5/2/2018 defer goal.  LLIS 8/72, had increased to 15/72 prior to treatment. Returns to 8/72. 4. To transition patient to independent restorative phase of CDT.5/2/2018 goal met. SUBJECTIVE REPORT:  Pt arrives to treatment with compression sleeve/glove, stating she was unable to don sleeve today without the EZ slide donning aid. Patient reports she and spouse have been applying compression bandaging at night without c/o. Pain: 10/10           Gait:  indep  ADLs:  indep  Treatment Response:  Limb volume measurements stabilizing. See scanned graph. Reviewed packet received at evaluation. Function: In compliance with CMSs Claims Based Outcome Reporting, the following G-code set was chosen for this patient based on their primary functional limitation being treated: The outcome measure chosen to determine the severity of the functional limitation was the LLIS with a score of 8/72 which was correlated with the impairment scale. ? Other PT/OT Primary Functional Limitations:     - CURRENT STATUS: CI - 1%-19% impaired, limited or restricted    - GOAL STATUS: CH - 0% impaired, limited or restricted    - D/C STATUS:  CI - 1%-19% impaired, limited or restricted             Patient's lymphedema is improved, with limb volume discrepancy reduced from up to 18.73% to 10.42% today.   Goals addressed as noted above    TREATMENT AND OBJECTIVE DATA SUMMARY:       Therapeutic exercise:  Pt to continue the following with assist of therapist:  Hand open/close, wrist flex/ext, wrist circumduction, elbow flex/ext, rowing, paddling, overhead reach L UE x 10. Pt instructed to continue 2x/day with compression garments in place. Pt verbalizes and demonstrates good understanding of HEP. Manual Therapy: 9:30-9:44 am 14 minutes     Area to decongest: L UE/Upper quadrant. Manual therapy:           Limb volume measurements completed L UE with 10.42% limb volume discrepancy, improved from 17.79% previous visit. Hand involvement resolved. Patient advised to continue skin care daily, monitoring for s/s of infection as noted below. Orthotic management: Assessed fit of custom flat knit compression garments, with good fit noted. Altered Tribute night time garment donned in clinic, with effective compression marks noted upon removal after short term wear of garment in clinic. Patient reports using vasopneumatic pump 2x/day, and agreeable to continue. Patient advised to continue day/night time compression garment wear, 1-2x/day use of vasopneumatic pump. Home management to continue as follows:  Compression garment routine:  1. Apply daytime compression stocking to clean, dry extremity first thing in the morning, EVERY MORNING. 2. Wear garment until bedtime, adjusting throughout the day as needed should garment wrinkle or crease. Problem areas can be at joint, and top of garment, ensuring proximal aspect of garment is positioned appropriately on extremity. 3. Launder compression garments nightly either by hand or washing machine in a garment bag or pillowcase. Use mild detergent with NO FABRIC SOFTENER, NO BLEACH, NO WOOLITE. Wash in cool/warm water. 4. Dry garment in dryer on low heat right side out in garment bag or pillowcase. 5. Daytime compression garments are NOT safe to sleep in, so remove at bedtime.     6. Perform skin care to extremity, cleansing skin daily with soap and water, and using low pH lotion, upward strokes to stimulate lymphatic vessels. 7. Apply night time compression garment as instructed to wear while sleeping, adjusting throughout the night as needed for comfort. 8. Perform exercise program with daytime compression stockings on. Signs/Symptoms of infection include:  Fever, flu-like symptoms, pain/redness/warmth in extremity, sometimes with increase in swelling present. Stop wearing your compression garment if this occurs. Call your doctor immediately or go to the Emergency room to address potential infection. Remove compression with changes in circulation or numbness in extremity, different from what you may experience when not wearing compression garment, pain, unexplained shortness of breath. Continue daily use of vasopneumatic pump for 1 hour, adding a second session as needed, with 6 hours minimum between use. TOTAL TREATMENT 58 mins     ASSESSMENT:   Treatment effectiveness and tolerance: Limb volume discrepancy significantly improved since last seen, with reduction from 17.79% to 10.42%. Patient advised that, with continued home compliance of phase II CDT, it is likely that she will experience further improvement in L UE lymphedema. Luciano Diane was altered by company, with effective fit of garment noted. Patient progressing well with home management of compression garments, day/night, vasopneumatic pump, skin care, HEP. Progress toward goals: See updated goals. Deferred LLIS goal.  Patient has returned to baseline after bandaging phase of treatment. PLAN OF CARE:   Changes to the plan of care: Transition to phase II CDT, with patient to call clinic prior to next scheduled appt with any questions or concerns. Daytime compression garments will require replacement next scheduled visit. Frequency: Discharge at this time. Other: Follow up in 4 months.        Alexis Craig, PT, CLT      Lymphedema Therapy discharge note      5/2/2018:  Patient will be discharged from lymphedema therapy at this time. Criteria for termination of care:    []         Patient has not returned to therapy  []         Patient has missed three or more visits without prior notification  [x]         Other: Goals addressed as noted above. Discharge at this time, with patient to continue compliance with home management phase of lymphedema as noted above. If you need any further information, please contact us at [phone].     Thank you for this referral.  Kika Gama, PT, CLT

## 2018-05-07 ENCOUNTER — HOSPITAL ENCOUNTER (OUTPATIENT)
Dept: LAB | Age: 78
Discharge: HOME | End: 2018-05-07
Payer: MEDICARE

## 2018-05-07 ENCOUNTER — HOSPITAL ENCOUNTER (OUTPATIENT)
Dept: CT IMAGING | Age: 78
Discharge: HOME | End: 2018-05-07
Attending: INTERNAL MEDICINE
Payer: MEDICARE

## 2018-05-07 ENCOUNTER — OFFICE VISIT (OUTPATIENT)
Dept: INTERNAL MEDICINE CLINIC | Age: 78
End: 2018-05-07

## 2018-05-07 VITALS
OXYGEN SATURATION: 99 % | HEIGHT: 61 IN | BODY MASS INDEX: 25.11 KG/M2 | RESPIRATION RATE: 16 BRPM | TEMPERATURE: 97.9 F | DIASTOLIC BLOOD PRESSURE: 77 MMHG | SYSTOLIC BLOOD PRESSURE: 126 MMHG | HEART RATE: 68 BPM | WEIGHT: 133 LBS

## 2018-05-07 DIAGNOSIS — R10.31 RLQ ABDOMINAL PAIN: ICD-10-CM

## 2018-05-07 DIAGNOSIS — R19.7 DIARRHEA, UNSPECIFIED TYPE: ICD-10-CM

## 2018-05-07 DIAGNOSIS — E78.5 DYSLIPIDEMIA, GOAL LDL BELOW 100: ICD-10-CM

## 2018-05-07 DIAGNOSIS — R10.31 RLQ ABDOMINAL PAIN: Primary | ICD-10-CM

## 2018-05-07 LAB — CREAT BLD-MCNC: 0.8 MG/DL (ref 0.6–1.3)

## 2018-05-07 PROCEDURE — 85025 COMPLETE CBC W/AUTO DIFF WBC: CPT

## 2018-05-07 PROCEDURE — 74177 CT ABD & PELVIS W/CONTRAST: CPT

## 2018-05-07 PROCEDURE — 74011636320 HC RX REV CODE- 636/320: Performed by: INTERNAL MEDICINE

## 2018-05-07 PROCEDURE — 36415 COLL VENOUS BLD VENIPUNCTURE: CPT

## 2018-05-07 PROCEDURE — 82565 ASSAY OF CREATININE: CPT

## 2018-05-07 PROCEDURE — 80053 COMPREHEN METABOLIC PANEL: CPT

## 2018-05-07 RX ADMIN — IOPAMIDOL 94 ML: 755 INJECTION, SOLUTION INTRAVENOUS at 17:47

## 2018-05-07 NOTE — PROGRESS NOTES
HISTORY OF PRESENT ILLNESS  Joshua Nolasco is a 70 y.o. female. MELVIN Chahal had an episode of right flank and abdominal pain in January, for which she had a CT scan here with Kalyan Ceron and was told she had fecal stasis. She notes she regularly uses three Dulcolax a day and gets diarrhea afterwards. She did this again last night and does not think she's impacted. She's been having pain for the last several days, describes it as a fairly focal area right flank and radiates towards front of right abdomen and lower abdomen. Rates it as 8/10. No nausea or vomiting. No hematuria, no change with eating or movement. Sometimes hurts a bit when she coughs. Last colonoscopy five years ago. She's not having fevers. Does have occasional chills and occasional sweats. Review of Systems   Constitutional: Negative for chills, diaphoresis, fever, malaise/fatigue and weight loss. Gastrointestinal: Positive for abdominal pain, constipation and diarrhea. Negative for blood in stool, heartburn, nausea and vomiting. Genitourinary: Positive for flank pain. Negative for dysuria, frequency, hematuria and urgency. Skin: Negative for rash. Physical Exam   Constitutional: She is oriented to person, place, and time. She appears well-developed and well-nourished. HENT:   Head: Normocephalic and atraumatic. Neck: Normal range of motion. Neck supple. Carotid bruit is not present. No thyromegaly present. Cardiovascular: Normal rate, regular rhythm, S1 normal, S2 normal, normal heart sounds and intact distal pulses. No murmur heard. Pulmonary/Chest: Effort normal and breath sounds normal. No respiratory distress. She has no wheezes. She has no rales. Abdominal: Soft. Bowel sounds are normal. She exhibits no distension. Tender rlq and focal area right side near muscles   Musculoskeletal: She exhibits no edema. Neurological: She is alert and oriented to person, place, and time. Skin: Skin is dry. No rash noted.  No erythema. Psychiatric: She has a normal mood and affect. Her behavior is normal.   Nursing note and vitals reviewed. ASSESSMENT and PLAN  Diagnoses and all orders for this visit:    1. RLQ abdominal pain-r.o diverticulitis r/o stones or hydronephrosis  Send to gi if sxs persist  Consider referred from back as well  -     CT ABD PELV W CONT; Future  -     CBC WITH AUTOMATED DIFF  -     METABOLIC PANEL, COMPREHENSIVE  -     URINALYSIS W/ RFLX MICROSCOPIC    2. Diarrhea, unspecified type    3.  Dyslipidemia, goal LDL below 100

## 2018-05-07 NOTE — MR AVS SNAPSHOT
[facility] 
 
 
 [address] 
[phone] Patient: Jaun Underwood MRN: [id number] MEZ:[id number] Visit Information Date & Time Provider Department Dept. Phone Encounter #  
 5/7/2018  2:00 PM Ryann Sampson MD [facility] [id number] Your Appointments 5/9/2018  1:00 PM  
ROUTINE CARE with Ryann Sampson MD  
[facility] Appt Note: 6 mo; 6 mo  
 [address] [place] [address] [place] [id number]  
[phone]  
  
   
 [address] [place] [id number] Upcoming Health Maintenance Date Due DTaP/Tdap/Td series (1 - Tdap) 4/10/1963 ZOSTER VACCINE AGE 60> 2/10/2002 GLAUCOMA SCREENING Q2Y 4/10/2007 MEDICARE YEARLY EXAM 3/26/2018 Pneumococcal 65+ High/Highest Risk (2 of 2 - PCV13) 5/9/2018 Influenza Age 5 to Adult 8/1/2018 Allergies as of 5/7/2018  Review Complete On: 5/7/2018 By: Ryann Sampson MD  
 No Known Allergies Current Immunizations  Never Reviewed Name Date Influenza High Dose Vaccine PF 9/27/2017 Pneumococcal Polysaccharide (PPSV-23) 5/9/2017 Not reviewed this visit You Were Diagnosed With   
  
 Codes Comments RLQ abdominal pain    -  Primary ICD-10-CM: R10.31 ICD-9-CM: 789.03 Diarrhea, unspecified type     ICD-10-CM: R19.7 ICD-9-CM: 787.91 Dyslipidemia, goal LDL below 100     ICD-10-CM: E78.5 ICD-9-CM: 272.4 Vitals BP Pulse Temp Resp Height(growth percentile) Weight(growth percentile) 126/77 (BP 1 Location: Left arm, BP Patient Position: Sitting) 68 97.9 °F (36.6 °C) (Oral) 16 5' 1\" (1.549 m) 133 lb (60.3 kg) SpO2 BMI OB Status Smoking Status 99% 25.13 kg/m2 Postmenopausal Former Smoker Vitals History BMI and BSA Data  Body Mass Index Body Surface Area  
 25.13 kg/m 2 1.61 m 2  
  
  
 Preferred Pharmacy Pharmacy Name Phone [facility] [address] [phone] Your Updated Medication List  
  
   
This list is accurate as of 5/7/18  2:30 PM.  Always use your most recent med list.  
  
  
  
  
 DULCOLAX (BISACODYL) 5 mg EC tablet Generic drug:  bisacodyl Take 15 mg by mouth daily as needed for Constipation. DULoxetine 60 mg capsule Commonly known as:  CYMBALTA Take 1 Cap by mouth daily. gabapentin 300 mg capsule Commonly known as:  NEURONTIN  
TAKE 2 CAPSULES BY MOUTH EVERY MORNING THEN 2 CAPSULES DURING THE DAY AND 4 CAPSULES AT BEDTIME  
  
 meloxicam 15 mg tablet Commonly known as:  MOBIC Take 1 Tab by mouth daily. omeprazole 20 mg capsule Commonly known as:  PRILOSEC Take 1 Cap by mouth daily. zaleplon 10 mg capsule Commonly known as:  SONATA Take 1 Cap by mouth nightly. Max Daily Amount: 10 mg. We Performed the Following CBC WITH AUTOMATED DIFF [62866 CPT(R)] METABOLIC PANEL, COMPREHENSIVE [07678 CPT(R)] URINALYSIS W/ RFLX MICROSCOPIC [61268 CPT(R)] To-Do List   
 05/07/2018 Imaging:  CT ABD PELV W CONT   
  
 10/10/2018 11:00 AM  
  Appointment with [place] at [address] [facility] ([phone]) Referral Information Referral ID Referred By Referred To  
  
 [id number] Osiel JACKSON Not Available Visits Status Start Date End Date 1 New Request 5/7/18 5/7/19 If your referral has a status of pending review or denied, additional information will be sent to support the outcome of this decision. Introducing [facility]! Emmanuelle Villatoro introduces [facility] patient portal. Now you can access parts of your medical record, email your doctor's office, and request medication refills online. 1. In your internet browser, go to [email or web address] 2. Click on the First Time User? Click Here link in the Sign In box. You will see the New Member Sign Up page. 3. Enter your [employer] Access Code exactly as it appears below. You will not need to use this code after youve completed the sign-up process. If you do not sign up before the expiration date, you must request a new code. · [employer] Access Code: [email or web address] Expires: 6/12/2018  3:20 PM 
 
4. Enter the last four digits of your Social Security Number (xxxx) and Date of Birth (mm/dd/yyyy) as indicated and click Submit. You will be taken to the next sign-up page. 5. Create a [employer] Your [employer] ID. This will be your [employer] login ID and cannot be changed, so think of one that is secure and easy to remember. 6. Create a [employer] password. You can change your password at any time. 7. Enter your Password Reset Question and Answer. This can be used at a later time if you forget your password. 8. Enter your e-mail address. You will receive e-mail notification when new information is available in [address] 9. Click Sign Up. You can now view and download portions of your medical record. 10. Click the Download Summary menu link to download a portable copy of your medical information. If you have questions, please visit the Frequently Asked Questions section of the [employer] website. Remember, [employer] is NOT to be used for urgent needs. For medical emergencies, dial 911. Now available from your iPhone and Android! Please provide this summary of care documentation to your next provider. Your primary care clinician is listed as [address]. If you have any questions after today's visit, please call [phone].

## 2018-05-08 LAB
ALBUMIN SERPL-MCNC: 4.5 G/DL (ref 3.5–4.8)
ALBUMIN/GLOB SERPL: 1.7 {RATIO} (ref 1.2–2.2)
ALP SERPL-CCNC: 67 IU/L (ref 39–117)
ALT SERPL-CCNC: 18 IU/L (ref 0–32)
AST SERPL-CCNC: 23 IU/L (ref 0–40)
BASOPHILS # BLD AUTO: 0 X10E3/UL (ref 0–0.2)
BASOPHILS NFR BLD AUTO: 1 %
BILIRUB SERPL-MCNC: 0.2 MG/DL (ref 0–1.2)
BUN SERPL-MCNC: 26 MG/DL (ref 8–27)
BUN/CREAT SERPL: 33 (ref 12–28)
CALCIUM SERPL-MCNC: 9.5 MG/DL (ref 8.7–10.3)
CHLORIDE SERPL-SCNC: 103 MMOL/L (ref 96–106)
CO2 SERPL-SCNC: 21 MMOL/L (ref 18–29)
CREAT SERPL-MCNC: 0.78 MG/DL (ref 0.57–1)
EOSINOPHIL # BLD AUTO: 0.1 X10E3/UL (ref 0–0.4)
EOSINOPHIL NFR BLD AUTO: 2 %
ERYTHROCYTE [DISTWIDTH] IN BLOOD BY AUTOMATED COUNT: 13.9 % (ref 12.3–15.4)
GFR SERPLBLD CREATININE-BSD FMLA CKD-EPI: 74 ML/MIN/1.73
GFR SERPLBLD CREATININE-BSD FMLA CKD-EPI: 85 ML/MIN/1.73
GLOBULIN SER CALC-MCNC: 2.7 G/DL (ref 1.5–4.5)
GLUCOSE SERPL-MCNC: 93 MG/DL (ref 65–99)
HCT VFR BLD AUTO: 35.8 % (ref 34–46.6)
HGB BLD-MCNC: 12.1 G/DL (ref 11.1–15.9)
IMM GRANULOCYTES # BLD: 0 X10E3/UL (ref 0–0.1)
IMM GRANULOCYTES NFR BLD: 0 %
LYMPHOCYTES # BLD AUTO: 1.5 X10E3/UL (ref 0.7–3.1)
LYMPHOCYTES NFR BLD AUTO: 26 %
MCH RBC QN AUTO: 30.3 PG (ref 26.6–33)
MCHC RBC AUTO-ENTMCNC: 33.8 G/DL (ref 31.5–35.7)
MCV RBC AUTO: 90 FL (ref 79–97)
MONOCYTES # BLD AUTO: 0.5 X10E3/UL (ref 0.1–0.9)
MONOCYTES NFR BLD AUTO: 9 %
NEUTROPHILS # BLD AUTO: 3.6 X10E3/UL (ref 1.4–7)
NEUTROPHILS NFR BLD AUTO: 62 %
PLATELET # BLD AUTO: 270 X10E3/UL (ref 150–379)
POTASSIUM SERPL-SCNC: 4.8 MMOL/L (ref 3.5–5.2)
PROT SERPL-MCNC: 7.2 G/DL (ref 6–8.5)
RBC # BLD AUTO: 4 X10E6/UL (ref 3.77–5.28)
SODIUM SERPL-SCNC: 139 MMOL/L (ref 134–144)
WBC # BLD AUTO: 5.8 X10E3/UL (ref 3.4–10.8)

## 2018-05-08 NOTE — PROGRESS NOTES
Patient's  notified of patient's CT results. Advised needs to follow up with gi as discussed at her visit. Provided  & Martha Lowery with gastrointestinal specialists.

## 2018-05-08 NOTE — PROGRESS NOTES
Notify shows only djd in spine=no diverticulitis and no renal issues needs to see gi as we discussed

## 2018-05-14 RX ORDER — GABAPENTIN 300 MG/1
CAPSULE ORAL
Qty: 240 CAP | Refills: 0 | Status: SHIPPED | OUTPATIENT
Start: 2018-05-14 | End: 2018-10-09 | Stop reason: SDUPTHER

## 2018-05-29 NOTE — PERIOP NOTES
[address]  Endoscopy Preprocedure Instructions      1. On the day of your surgery, please report to registration located on the 2nd floor of the  [facility]. yes    2. You must have a responsible adult to drive you to the hospital, stay at the hospital during your procedure and drive you home. If they leave your procedure will not be started (no exceptions). yes    3. Do not have anything to eat or drink (including water, gum, mints, coffee, and juice) after midnight. This does not apply to the medications you were instructed to take by your physician. yesIf you are currently taking Plavix, Coumadin, Aspirin, or other blood-thinning agents, contact your physician for special instructions. yes,    4. If you are having a procedure that requires bowel prep: We recommend that you have only clear liquids the day before your procedure and begin your bowel prep by 5:00 pm.  You may continue to drink clear liquids until midnight. If for any reason you are not able to complete your prep please notify your physician immediately. yes    5. Have a list of all current medications, including vitamins, herbal supplements and any other over the counter medications. yes    6. If you wear glasses, contacts, dentures and/or hearing aids, they may be removed prior to procedure, please bring a case to store them in. yes    7. You should understand that if you do not follow these instructions your procedure may be cancelled. If your physical condition changes (I.e. fever, cold or flu) please contact your doctor as soon as possible. 8. It is important that you be on time.   If for any reason you are unable to keep your appointment please call )- the day of or your physicians office prior to your scheduled procedure

## 2018-05-30 NOTE — H&P
[facility]  [address], [address] [facility]  [phone]                     History and Physical     NAME: Emerita Harry   :  1940   MRN:  [id number]     HPI:   70year old female who presents with a complaint of Abdominal Pain. The patient presents for consultation at the request of Dr. Shay Andrews). The onset of the abdominal pain has been variable and has been occurring in a persistent pattern for 3 weeks. The abdominal pain is described as sharp pain (and hard) and  is described as being located in the right lower quadrant .  The abdominal pain radiates to the right flank. The symptoms are relieved by bowel movements. The symptoms have been associated with constipation (has been on Dulcolax 3 every night in order to go.  Has not much stool just goosh. Martin Kirby does not take has very small stool.  Denies blood.),  while the symptoms have not been associated with abdominal distention, family history of colon cancer, nausea, vomiting or weight loss. Previous diagnostic tests have included CT scan. The patient had a colonoscopy 7 years (or 8) ago .  The abdominal pain is characterized as improved.      Past Surgical History:   Procedure Laterality Date    HX BREAST BIOPSY Left yrs    neg; stereotactic    HX BREAST LUMPECTOMY Left 2015    HX BREAST REDUCTION Bilateral yrs ago    HX GYN      Hysterectomy    HX ORTHOPAEDIC Right     surgery on the ankle     Past Medical History:   Diagnosis Date    Arthritis     Breast cancer ([address] 2015     left triple neg    Neurological disorder     chemotherapy induced neuropathy    Radiation therapy complication 5963    left breast ca     Social History   Substance Use Topics    Smoking status: Former Smoker    Smokeless tobacco: Never Used    Alcohol use No     No Known Allergies  Family History   Problem Relation Age of Onset    Breast Cancer Maternal Grandmother     Heart Disease Mother     Heart Disease Father No current facility-administered medications for this encounter. Current Outpatient Prescriptions   Medication Sig    linaclotide (LINZESS PO) Take  by mouth.  gabapentin (NEURONTIN) 300 mg capsule TAKE 2 CAPSULES BY MOUTH EVERY MORNING THEN 2 CAPSULES DURING THE DAY AND 4 CAPSULES AT BEDTIME    bisacodyl (DULCOLAX, BISACODYL,) 5 mg EC tablet Take 15 mg by mouth daily as needed for Constipation.  meloxicam (MOBIC) 15 mg tablet Take 1 Tab by mouth daily.  DULoxetine (CYMBALTA) 60 mg capsule Take 1 Cap by mouth daily.  omeprazole (PRILOSEC) 20 mg capsule Take 1 Cap by mouth daily.  zaleplon (SONATA) 10 mg capsule Take 1 Cap by mouth nightly. Max Daily Amount: 10 mg. PHYSICAL EXAM:  General: WD, WN. Alert, cooperative, no acute distress    HEENT: NC, Atraumatic. PERRLA, EOMI. Anicteric sclerae. Lungs:  CTA Bilaterally. No Wheezing/Rhonchi/Rales. Heart:  Regular  rhythm,  No murmur, No Rubs, No Gallops  Abdomen: Soft, Non distended, Non tender.  +Bowel sounds, no HSM  Extremities: No c/c/e  Neurologic:  CN 2-12 gi, Alert and oriented X 3. No acute neurological distress   Psych:   Good insight. Not anxious nor agitated. Assessment:   I have reviewed with the patient +/- family alternatives,benefits and risks for the procedure, as well as potential complications(with emphasis on, but not limited to, bleeding, perforation, cardiovascular/cerebrovascular/pulmonary events, reactions to the medications, infection, risk of missing a lesions/a cancer, and the imponderables including death), alternative options, and patient/family voices understanding.       Plan:   · Endoscopic procedure  · Conscious sedation or MAC

## 2018-05-31 ENCOUNTER — ANESTHESIA EVENT (OUTPATIENT)
Dept: ENDOSCOPY | Age: 78
End: 2018-05-31
Payer: MEDICARE

## 2018-05-31 ENCOUNTER — HOSPITAL ENCOUNTER (OUTPATIENT)
Age: 78
Setting detail: OUTPATIENT SURGERY
Discharge: HOME | End: 2018-05-31
Attending: INTERNAL MEDICINE | Admitting: INTERNAL MEDICINE
Payer: MEDICARE

## 2018-05-31 ENCOUNTER — ANESTHESIA (OUTPATIENT)
Dept: ENDOSCOPY | Age: 78
End: 2018-05-31
Payer: MEDICARE

## 2018-05-31 VITALS
SYSTOLIC BLOOD PRESSURE: 145 MMHG | RESPIRATION RATE: 17 BRPM | OXYGEN SATURATION: 99 % | DIASTOLIC BLOOD PRESSURE: 60 MMHG | HEIGHT: 61 IN | WEIGHT: 133 LBS | TEMPERATURE: 97.7 F | HEART RATE: 65 BPM | BODY MASS INDEX: 25.11 KG/M2

## 2018-05-31 PROCEDURE — 76040000019: Performed by: INTERNAL MEDICINE

## 2018-05-31 PROCEDURE — 74011250636 HC RX REV CODE- 250/636: Performed by: INTERNAL MEDICINE

## 2018-05-31 PROCEDURE — 74011250637 HC RX REV CODE- 250/637: Performed by: INTERNAL MEDICINE

## 2018-05-31 PROCEDURE — 76060000031 HC ANESTHESIA FIRST 0.5 HR: Performed by: INTERNAL MEDICINE

## 2018-05-31 PROCEDURE — 74011250636 HC RX REV CODE- 250/636

## 2018-05-31 RX ORDER — MIDAZOLAM HYDROCHLORIDE 1 MG/ML
.25-5 INJECTION, SOLUTION INTRAMUSCULAR; INTRAVENOUS
Status: DISCONTINUED | OUTPATIENT
Start: 2018-05-31 | End: 2018-05-31 | Stop reason: HOSPADM

## 2018-05-31 RX ORDER — EPINEPHRINE 0.1 MG/ML
1 INJECTION INTRACARDIAC; INTRAVENOUS
Status: DISCONTINUED | OUTPATIENT
Start: 2018-05-31 | End: 2018-05-31 | Stop reason: HOSPADM

## 2018-05-31 RX ORDER — CELECOXIB 200 MG/1
200 CAPSULE ORAL 2 TIMES DAILY
COMMUNITY
End: 2018-06-04 | Stop reason: SDUPTHER

## 2018-05-31 RX ORDER — SODIUM CHLORIDE 9 MG/ML
INJECTION, SOLUTION INTRAVENOUS
Status: DISCONTINUED | OUTPATIENT
Start: 2018-05-31 | End: 2018-05-31 | Stop reason: HOSPADM

## 2018-05-31 RX ORDER — PROPOFOL 10 MG/ML
INJECTION, EMULSION INTRAVENOUS AS NEEDED
Status: DISCONTINUED | OUTPATIENT
Start: 2018-05-31 | End: 2018-05-31 | Stop reason: HOSPADM

## 2018-05-31 RX ORDER — PROPOFOL 10 MG/ML
INJECTION, EMULSION INTRAVENOUS
Status: DISCONTINUED | OUTPATIENT
Start: 2018-05-31 | End: 2018-05-31 | Stop reason: HOSPADM

## 2018-05-31 RX ORDER — ATROPINE SULFATE 0.1 MG/ML
0.5 INJECTION INTRAVENOUS
Status: DISCONTINUED | OUTPATIENT
Start: 2018-05-31 | End: 2018-05-31 | Stop reason: HOSPADM

## 2018-05-31 RX ORDER — SODIUM CHLORIDE 9 MG/ML
50 INJECTION, SOLUTION INTRAVENOUS CONTINUOUS
Status: DISCONTINUED | OUTPATIENT
Start: 2018-05-31 | End: 2018-05-31 | Stop reason: HOSPADM

## 2018-05-31 RX ORDER — DEXTROMETHORPHAN/PSEUDOEPHED 2.5-7.5/.8
1.2 DROPS ORAL
Status: DISCONTINUED | OUTPATIENT
Start: 2018-05-31 | End: 2018-05-31 | Stop reason: HOSPADM

## 2018-05-31 RX ORDER — FLUMAZENIL 0.1 MG/ML
0.2 INJECTION INTRAVENOUS
Status: DISCONTINUED | OUTPATIENT
Start: 2018-05-31 | End: 2018-05-31 | Stop reason: HOSPADM

## 2018-05-31 RX ORDER — NALOXONE HYDROCHLORIDE 0.4 MG/ML
0.4 INJECTION, SOLUTION INTRAMUSCULAR; INTRAVENOUS; SUBCUTANEOUS
Status: DISCONTINUED | OUTPATIENT
Start: 2018-05-31 | End: 2018-05-31 | Stop reason: HOSPADM

## 2018-05-31 RX ADMIN — SODIUM CHLORIDE 50 ML/HR: 9 INJECTION, SOLUTION INTRAVENOUS at 06:51

## 2018-05-31 RX ADMIN — SODIUM CHLORIDE: 9 INJECTION, SOLUTION INTRAVENOUS at 07:30

## 2018-05-31 RX ADMIN — PROPOFOL 120 MCG/KG/MIN: 10 INJECTION, EMULSION INTRAVENOUS at 07:41

## 2018-05-31 RX ADMIN — PROPOFOL 100 MG: 10 INJECTION, EMULSION INTRAVENOUS at 07:41

## 2018-05-31 RX ADMIN — SIMETHICONE 80 MG: 20 SUSPENSION/ DROPS ORAL at 07:46

## 2018-05-31 NOTE — PROCEDURES
[address] [facility]  [address] [facility], [address] [facility]  [phone]                   Colonoscopy Operative Report      Indications:    Constipation     :  Alisha Bennett MD    Referring Provider: Philly Crooks MD    Sedation:  MAC anesthesia Propofol    Procedure Details:  After informed consent was obtained with all risks and benefits of procedure explained and preoperative exam completed, the patient was taken to the endoscopy suite and placed in the left lateral decubitus position. Upon sequential sedation as per above, a digital rectal exam was performed  And was normal.  The Olympus videocolonoscope  was inserted in the rectum and carefully advanced to the cecum, which was identified by the ileocecal valve and appendiceal orifice. The quality of preparation was excellent. The colonoscope was slowly withdrawn with careful evaluation between folds. Retroflexion in the rectum was performed and was normal..     Findings:   Rectum: Grade 1 internal hemorrhoid(s); Sigmoid:     - Diverticulosis  Descending Colon:     - Diverticulosis  Transverse Colon: normal  Ascending Colon: normal  Cecum: normal  Terminal Ileum: normal    Interventions:  none    Specimen Removed:  none    Complications: None. EBL:  None. Recommendations:   -For colon cancer screening in this average-risk patient, colonoscopy may be repeated in 10 years.  -High fiber diet.     -Start linzess 290 mcg daily #90  -Resume normal medication(s)  -Follow p in office as previously scheduled       Discharge Disposition:  Home in the company of a  when able to ambulate.     Alisha Bennett MD  5/31/2018  7:56 AM

## 2018-05-31 NOTE — ROUTINE PROCESS
Mario Bj  1940  [id number]    Situation:  Verbal report received from: Adriana Zuleta RN  Procedure: Procedure(s):  COLONOSCOPY    Background:    Preoperative diagnosis: CONSTIPATION  Postoperative diagnosis: Diverticulosis  Hemorrhoids    :  Dr. Alfredo Luz  Assistant(s): Endoscopy Technician-1: Ellen Leong  Endoscopy RN-1: Adriana Zuleta RN    Specimens: * No specimens in log *  H. Pylori  no    Assessment:  Intra-procedure medications   Anesthesia gave intra-procedure sedation and medications, see anesthesia flow sheet yes    Intravenous fluids: NS@ KVO     Vital signs stable     Abdominal assessment: round and soft     Recommendation:  Discharge patient per MD order  Family or Friend   Permission to share finding with family or friend yes    Endoscopy discharge instructions have been reviewed and given to patient and spouse. The patient and spouse verbalized understanding and acceptance of instructions.

## 2018-05-31 NOTE — ANESTHESIA POSTPROCEDURE EVALUATION
Post-Anesthesia Evaluation and Assessment    Patient: Williams Holley MRN: [id number]  SSN: [id number]    YOB: 1940  Age: 70 y.o. Sex: female       Cardiovascular Function/Vital Signs  Visit Vitals    /60    Pulse 65    Temp 36.5 °C (97.7 °F)    Resp 17    Ht 5' 1\" (1.549 m)    Wt 60.3 kg (133 lb)    SpO2 99%    Breastfeeding No    BMI 25.13 kg/m2       Patient is status post MAC anesthesia for Procedure(s):  COLONOSCOPY. Nausea/Vomiting: None    Postoperative hydration reviewed and adequate. Pain:  Pain Scale 1: Numeric (0 - 10) (05/31/18 0819)  Pain Intensity 1: 0 (05/31/18 0819)   Managed    Neurological Status: At baseline    Mental Status and Level of Consciousness: Arousable    Pulmonary Status:   O2 Device: Room air (05/31/18 0819)   Adequate oxygenation and airway patent    Complications related to anesthesia: None    Post-anesthesia assessment completed.  No concerns    Signed By: Angelita Willis MD     May 31, 2018

## 2018-05-31 NOTE — IP AVS SNAPSHOT
[address] [facility] [address] 70 [facility] 
[phone] Patient: Cora Farrell MRN: [id number]:[id number] About your hospitalization You were admitted on:  May 31, 2018 You last received care in the:  [facility] ENDOSCOPY You were discharged on:  May 31, 2018 Why you were hospitalized Your primary diagnosis was:  Not on File Follow-up Information None Discharge Orders None A check nettie indicates which time of day the medication should be taken. My Medications CHANGE how you take these medications Instructions Each Dose to Equal  
 Morning Noon Evening Bedtime  
 linaclotide 290 mcg Cap capsule Commonly known as:  Misael Rebollar What changed:   
- medication strength 
- how much to take - when to take this Your last dose was: Your next dose is: Take 1 Cap by mouth daily for 90 days. 290 mcg CONTINUE taking these medications Instructions Each Dose to Equal  
 Morning Noon Evening Bedtime  
 celecoxib 200 mg capsule Commonly known as:  CELEBREX Your last dose was: Your next dose is: Take 200 mg by mouth two (2) times a day. 200 mg DULCOLAX (BISACODYL) 5 mg EC tablet Generic drug:  bisacodyl Your last dose was: Your next dose is: Take 15 mg by mouth daily as needed for Constipation. 15 mg DULoxetine 60 mg capsule Commonly known as:  CYMBALTA Your last dose was: Your next dose is: Take 1 Cap by mouth daily. 60 mg  
    
   
   
   
  
 gabapentin 300 mg capsule Commonly known as:  NEURONTIN Your last dose was: Your next dose is: TAKE 2 CAPSULES BY MOUTH EVERY MORNING THEN 2 CAPSULES DURING THE DAY AND 4 CAPSULES AT BEDTIME  
     
   
   
   
  
 meloxicam 15 mg tablet Commonly known as:  MOBIC Your last dose was: Your next dose is: Take 1 Tab by mouth daily. 15 mg  
    
   
   
   
  
 omeprazole 20 mg capsule Commonly known as:  PRILOSEC Your last dose was: Your next dose is: Take 1 Cap by mouth daily. 20 mg  
    
   
   
   
  
 zaleplon 10 mg capsule Commonly known as:  SONATA Your last dose was: Your next dose is: Take 1 Cap by mouth nightly. Max Daily Amount: 10 mg.  
 10 mg Where to Get Your Medications Information on where to get these meds will be given to you by the nurse or doctor. ! Ask your nurse or doctor about these medications  
  linaclotide 290 mcg Cap capsule Discharge Instructions [address] [place] [address] [address] [facility], [address] [facility] 
[phone] [facility] [id number] 1940 COLON DISCHARGE INSTRUCTIONS DISCOMFORT: 
Redness at IV site- apply warm compress to area; if redness or soreness persist- contact your physician There may be a slight amount of blood passed from the rectum Gaseous discomfort- walking, belching will help relieve any discomfort You may not operate a vehicle for 12 hours You may not  engage in an occupation involving machinery or appliances for rest of today You may not  drink alcoholic beverages for at least 12 hours Avoid making any critical decisions for at least 24 hour DIET: 
 High fiber diet.  however -  remember your colon is empty and a heavy meal will produce gas. Avoid these foods:  vegetables, fried / greasy foods, carbonated drinks for today ACTIVITY: It is recommended that you spend the remainder of the day resting -  avoid any strenuous activity. CALL M.D. ANY SIGN OF: Increasing pain, nausea, vomiting Abdominal distension (swelling) New increased bleeding (oral or rectal) Fever (chills) Pain in chest area Bloody discharge from nose or mouth Shortness of breath You may resume medications Post procedure diagnosis:  
Diverticulosis Hemorrhoids Follow-up Instructions: 
 
If a specimen was collected, you will receive a letter with the result by mail within two  weeks. Depending on the result this letter will specify your follow up colonoscopy date. Please call us for any questions or concerns Jelani Tay [id number] 1940 DISCHARGE SUMMARY from Nurse The following personal items collected during your admission are returned to you:  
Dental Appliance: Dental Appliances: None Vision: Visual Aid: None Hearing Aid:   
Jewelry:   
Clothing:   
Other Valuables:   
Valuables sent to safe:    
 
Linaclotide (By mouth) Linaclotide ([id number]-ZZ-xlo-tide) Treats irritable bowel syndrome with constipation and chronic idiopathic constipation. Brand Name(s): Linzess There may be other brand names for this medicine. When This Medicine Should Not Be Used: This medicine is not right for everyone. Do not use it if you had an allergic reaction to linaclotide, or if you have a bowel or stomach blockage. How to Use This Medicine:  
Capsule · Your doctor will tell you how much medicine to use. Do not use more than directed. · Take this medicine on an empty stomach, at least 30 minutes before breakfast or your first meal of the day. · Swallow the capsule whole. Do not crush, break, or chew it. · If you have trouble swallowing the capsule, you may mix the contents with applesauce or water. ¨ To mix with applesauce: Open the capsule and sprinkle the beads on 1 teaspoonful of applesauce. Swallow the mixture immediately without chewing. Do not store it for later use. ¨ To mix with water: Open the capsule and sprinkle the beads into a cup with 30 mL (1 ounce) of water. Gently swirl for at least 10 seconds. Swallow the entire mixture immediately. Add another 30 mL (1 ounce) of water to the cup and swirl. Drink the water right away to make sure all of the medicine is taken. Do not store it for later use. ¨ The water mixture may also be used with a nasogastric or gastric feeding tube. After the mixture is given, flush the tube with an additional 10 mL (2 teaspoons) of water. · This medicine should come with a Medication Guide. Ask your pharmacist for a copy if you do not have one. · Missed dose: Skip the missed dose and go back to your regular dosing schedule. Do not double doses. · Store the medicine in a closed container at room temperature, away from heat, moisture, and direct light. Keep the medicine in the original bottle until you are ready to use it. The bottle contains a desiccant packet that helps protect the capsules from moisture. Do not remove the packet from the bottle. Drugs and Foods to Avoid: Ask your doctor or pharmacist before using any other medicine, including over-the-counter medicines, vitamins, and herbal products. Warnings While Using This Medicine: · Tell your doctor if you are pregnant or breastfeeding, or if you have other stomach or bowel problems. · Your doctor will check your progress and the effects of this medicine at regular visits. Keep all appointments. · Keep all medicine out of the reach of children. Never share your medicine with anyone. Possible Side Effects While Using This Medicine:  
Call your doctor right away if you notice any of these side effects: · Allergic reaction: Itching or hives, swelling in your face or hands, swelling or tingling in your mouth or throat, chest tightness, trouble breathing · Bloody or black, tarry stools · Lightheadedness, dizziness, fainting · Severe diarrhea or stomach pain If you notice these less serious side effects, talk with your doctor: · Mild diarrhea If you notice other side effects that you think are caused by this medicine, tell your doctor. Call your doctor for medical advice about side effects. You may report side effects to FDA at 3-063-FDA-8316 © 2017 [facility] Information is for End User's use only and may not be sold, redistributed or otherwise used for commercial purposes. The above information is an  only. It is not intended as medical advice for individual conditions or treatments. Talk to your doctor, nurse or pharmacist before following any medical regimen to see if it is safe and effective for you. ACO Transitions of Care Introducing [employer] offers a voluntary care coordination program to provide high quality service and care to [facility] fee-for-service beneficiaries. Dagoberto Dania was designed to help you enhance your health and well-being through the following services: ? Transitions of Care  support for individuals who are transitioning from one care setting to another (example: Hospital to home). ? Chronic and Complex Care Coordination  support for individuals and caregivers of those with serious or chronic illnesses or with more than one chronic (ongoing) condition and those who take a number of different medications. If you meet specific medical criteria, a [facility] [address] may call you directly to coordinate your care with your primary care physician and your other care providers. For questions about the [facility] programs, please, contact your physicians office. For general questions or additional information about Accountable Care Organizations: 
Please visit www.medicare.gov/acos. html or call 1-800-MEDICARE (2-540.255.8675) TTY users should call 2-890.630.7343. Introducing [facility]!    
 Emmanuelle Villatoro introduces [employer] patient portal. Now you can access parts of your medical record, email your doctor's office, and request medication refills online. 1. In your internet browser, go to [email or web address] 2. Click on the First Time User? Click Here link in the Sign In box. You will see the New Member Sign Up page. 3. Enter your [employer] Access Code exactly as it appears below. You will not need to use this code after youve completed the sign-up process. If you do not sign up before the expiration date, you must request a new code. · [employer] Access Code: [email or web address] Expires: 6/12/2018  3:20 PM 
 
4. Enter the last four digits of your Social Security Number (xxxx) and Date of Birth (mm/dd/yyyy) as indicated and click Submit. You will be taken to the next sign-up page. 5. Create a [employer] ID. This will be your [employer] login ID and cannot be changed, so think of one that is secure and easy to remember. 6. Create a [employer] password. You can change your password at any time. 7. Enter your Password Reset Question and Answer. This can be used at a later time if you forget your password. 8. Enter your e-mail address. You will receive e-mail notification when new information is available in [address] 9. Click Sign Up. You can now view and download portions of your medical record. 10. Click the Download Summary menu link to download a portable copy of your medical information. If you have questions, please visit the Frequently Asked Questions section of the [employer] website. Remember, [employer] is NOT to be used for urgent needs. For medical emergencies, dial 911. Now available from your iPhone and Android! Introducing [employer] As a [facility] patient, I wanted to make you aware of our electronic visit tool called [employer]. [facility] 24/7 allows you to connect within minutes with a medical provider 24 hours a day, seven days a week via a mobile device or tablet or logging into a secure website from your computer. You can access [employer] from anywhere in the [place]. A virtual visit might be right for you when you have a simple condition and feel like you just dont want to get out of bed, or cant get away from work for an appointment, when your regular [facility] provider is not available (evenings, weekends or holidays), or when youre out of town and need minor care. Electronic visits cost only $49 and if the [facility] 24/7 provider determines a prescription is needed to treat your condition, one can be electronically transmitted to a nearby pharmacy*. Please take a moment to enroll today if you have not already done so. The enrollment process is free and takes just a few minutes. To enroll, please download the [facility] 24/7 clive to your tablet or phone, or visit [email or web address] to enroll on your computer. And, as an [address] [place] [address] patient with a [employer] account, the results of your visits will be scanned into your electronic medical record and your primary care provider will be able to view the scanned results. We urge you to continue to see your regular [facility] provider for your ongoing medical care. And while your primary care provider may not be the one available when you seek a [employer] virtual visit, the peace of mind you get from getting a real diagnosis real time can be priceless. For more information on [employer], view our Frequently Asked Questions (FAQs) at [email or web address]. Sincerely, 
 
Booker Gresham MD 
Chief Medical Officer [facility] Maranda Phan *:  certain medications cannot be prescribed via [employer] Providers Seen During Your Hospitalization Provider Specialty Primary office phone Mohamud Hurtado MD Gastroenterology [phone] Your Primary Care Physician (PCP) Primary Care Physician Office Phone Office Fax Allen Evaristo [phone] You are allergic to the following No active allergies Recent Documentation Height Weight Breastfeeding? BMI OB Status Smoking Status 1.549 m 60.3 kg No 25.13 kg/m2 Hysterectomy Former Smoker Emergency Contacts Name Discharge Info Relation Home Work Mobile [address] CAREGIVER [3] Spouse [3] [phone] Patient Belongings The following personal items are in your possession at time of discharge: 
  Dental Appliances: None  Visual Aid: None Please provide this summary of care documentation to your next provider. Signatures-by signing, you are acknowledging that this After Visit Summary has been reviewed with you and you have received a copy. Patient Signature:  ____________________________________________________________ Date:  ____________________________________________________________  
  
Luisa Mccormick Provider Signature:  ____________________________________________________________ Date:  ____________________________________________________________

## 2018-05-31 NOTE — PERIOP NOTES
Procedure being performed under MAC; Don Macias CRNA at bedside monitoring patient at 8009. See anesthesia notes. Endoscope was pre-cleaned at bedside immediately following procedure by Ronak East at 3247. Care of patient assumed from the anesthesia provider at [phone]. Patient tolerated procedure well. Abdomen remains soft and non tender post procedure, no complaints or indication of discomfort noted at this time. See anesthesia note. Patient transferred to Endoscopy Recovery and report given to recovery nurse Judith Roy.

## 2018-05-31 NOTE — DISCHARGE INSTRUCTIONS
[address] [facility] [address]  [address] [facility] [place], [address] [facility]  [phone]                   Moody Mitchell  [id number]  1940    COLON DISCHARGE INSTRUCTIONS    DISCOMFORT:  Redness at IV site- apply warm compress to area; if redness or soreness persist- contact your physician  There may be a slight amount of blood passed from the rectum  Gaseous discomfort- walking, belching will help relieve any discomfort  You may not operate a vehicle for 12 hours  You may not  engage in an occupation involving machinery or appliances for rest of today  You may not  drink alcoholic beverages for at least 12 hours  Avoid making any critical decisions for at least 24 hour    DIET:   High fiber diet. - however -  remember your colon is empty and a heavy meal will produce gas. Avoid these foods:  vegetables, fried / greasy foods, carbonated drinks for today     ACTIVITY:  It is recommended that you spend the remainder of the day resting -  avoid any strenuous activity. CALL M.D. ANY SIGN OF:   Increasing pain, nausea, vomiting  Abdominal distension (swelling)  New increased bleeding (oral or rectal)  Fever (chills)  Pain in chest area  Bloody discharge from nose or mouth  Shortness of breath    You may resume medications    Post procedure diagnosis:   Diverticulosis  Hemorrhoids    Follow-up Instructions:    If a specimen was collected, you will receive a letter with the result by mail within two  weeks. Depending on the result this letter will specify your follow up colonoscopy date.       Please call us for any questions or concerns                     Moody Mitchell  [id number]  1940        DISCHARGE SUMMARY from Nurse    The following personal items collected during your admission are returned to you:   Dental Appliance: Dental Appliances: None  Vision: Visual Aid: None  Hearing Aid:    Jewelry:    Clothing:    Other Valuables:    Valuables sent to safe:       Linaclotide (By mouth) Linaclotide (yhd-AN-dlf-tide)  Treats irritable bowel syndrome with constipation and chronic idiopathic constipation. Brand Name(s): Linzess   There may be other brand names for this medicine. When This Medicine Should Not Be Used: This medicine is not right for everyone. Do not use it if you had an allergic reaction to linaclotide, or if you have a bowel or stomach blockage. How to Use This Medicine:   Capsule  · Your doctor will tell you how much medicine to use. Do not use more than directed. · Take this medicine on an empty stomach, at least 30 minutes before breakfast or your first meal of the day. · Swallow the capsule whole. Do not crush, break, or chew it. · If you have trouble swallowing the capsule, you may mix the contents with applesauce or water. ¨ To mix with applesauce: Open the capsule and sprinkle the beads on 1 teaspoonful of applesauce. Swallow the mixture immediately without chewing. Do not store it for later use. ¨ To mix with water: Open the capsule and sprinkle the beads into a cup with 30 mL (1 ounce) of water. Gently swirl for at least 10 seconds. Swallow the entire mixture immediately. Add another 30 mL (1 ounce) of water to the cup and swirl. Drink the water right away to make sure all of the medicine is taken. Do not store it for later use. ¨ The water mixture may also be used with a nasogastric or gastric feeding tube. After the mixture is given, flush the tube with an additional 10 mL (2 teaspoons) of water. · This medicine should come with a Medication Guide. Ask your pharmacist for a copy if you do not have one. · Missed dose: Skip the missed dose and go back to your regular dosing schedule. Do not double doses. · Store the medicine in a closed container at room temperature, away from heat, moisture, and direct light. Keep the medicine in the original bottle until you are ready to use it.  The bottle contains a desiccant packet that helps protect the capsules from moisture. Do not remove the packet from the bottle. Drugs and Foods to Avoid:      Ask your doctor or pharmacist before using any other medicine, including over-the-counter medicines, vitamins, and herbal products. Warnings While Using This Medicine:   · Tell your doctor if you are pregnant or breastfeeding, or if you have other stomach or bowel problems. · Your doctor will check your progress and the effects of this medicine at regular visits. Keep all appointments. · Keep all medicine out of the reach of children. Never share your medicine with anyone. Possible Side Effects While Using This Medicine:   Call your doctor right away if you notice any of these side effects:  · Allergic reaction: Itching or hives, swelling in your face or hands, swelling or tingling in your mouth or throat, chest tightness, trouble breathing  · Bloody or black, tarry stools  · Lightheadedness, dizziness, fainting  · Severe diarrhea or stomach pain  If you notice these less serious side effects, talk with your doctor:   · Mild diarrhea  If you notice other side effects that you think are caused by this medicine, tell your doctor. Call your doctor for medical advice about side effects. You may report side effects to FDA at 0-438-FDA-6305  © 2017 [address] Information is for End User's use only and may not be sold, redistributed or otherwise used for commercial purposes. The above information is an  only. It is not intended as medical advice for individual conditions or treatments. Talk to your doctor, nurse or pharmacist before following any medical regimen to see if it is safe and effective for you.

## 2018-05-31 NOTE — ANESTHESIA PREPROCEDURE EVALUATION
Anesthetic History   No history of anesthetic complications            Review of Systems / Medical History  Patient summary reviewed, nursing notes reviewed and pertinent labs reviewed    Pulmonary  Within defined limits                 Neuro/Psych   Within defined limits           Cardiovascular  Within defined limits                Exercise tolerance: >4 METS  Comments: Not on beta blocker   GI/Hepatic/Renal  Within defined limits              Endo/Other        Arthritis and cancer     Other Findings   Comments: chemotherapy induced neuropathy  MVP  lymphedema           Physical Exam    Airway  Mallampati: II  TM Distance: 4 - 6 cm  Neck ROM: normal range of motion   Mouth opening: Normal     Cardiovascular  Regular rate and rhythm,  S1 and S2 normal,  no murmur, click, rub, or gallop  Rhythm: regular  Rate: normal         Dental    Dentition: Caps/crowns     Pulmonary  Breath sounds clear to auscultation               Abdominal  GI exam deferred       Other Findings            Anesthetic Plan    ASA: 2  Anesthesia type: MAC            Anesthetic plan and risks discussed with: Patient

## 2018-05-31 NOTE — PERIOP NOTES
Patient medication teaching was done for jack. [address] were included with discharge instructions. Opportunities for questions given and clarification was provided. patient and spouse verbalized understanding and acceptance. Medication side effects guide given and reviewed.

## 2018-06-04 ENCOUNTER — TELEPHONE (OUTPATIENT)
Dept: INTERNAL MEDICINE CLINIC | Age: 78
End: 2018-06-04

## 2018-06-04 DIAGNOSIS — F51.01 PRIMARY INSOMNIA: Primary | ICD-10-CM

## 2018-06-04 RX ORDER — OMEPRAZOLE 20 MG/1
20 CAPSULE, DELAYED RELEASE ORAL DAILY
Qty: 90 CAP | Refills: 1 | Status: SHIPPED | OUTPATIENT
Start: 2018-06-04 | End: 2018-06-05 | Stop reason: SDUPTHER

## 2018-06-04 RX ORDER — CELECOXIB 200 MG/1
200 CAPSULE ORAL 2 TIMES DAILY
Qty: 60 CAP | Refills: 2 | Status: SHIPPED | OUTPATIENT
Start: 2018-06-04 | End: 2018-06-05 | Stop reason: SDUPTHER

## 2018-06-04 RX ORDER — ZALEPLON 10 MG/1
10 CAPSULE ORAL
Qty: 90 CAP | Refills: 0 | OUTPATIENT
Start: 2018-06-04 | End: 2018-09-05 | Stop reason: SDUPTHER

## 2018-06-04 NOTE — TELEPHONE ENCOUNTER
Pt's  stopped by the office requesting refills. He would like them filled because they are going out of town tomorrow. I verified the pharmacy and if you have any questions please call [phone] (M).       Thank you

## 2018-06-04 NOTE — TELEPHONE ENCOUNTER
Pt's  would like a return call because he does not want all of the refills phoned in to the pharmacy. He is requesting the hard copy for some of them. Please call his verified mobile.      Thanks

## 2018-06-05 RX ORDER — OMEPRAZOLE 20 MG/1
20 CAPSULE, DELAYED RELEASE ORAL DAILY
Qty: 90 CAP | Refills: 1 | Status: SHIPPED | OUTPATIENT
Start: 2018-06-05 | End: 2018-11-12 | Stop reason: SDUPTHER

## 2018-06-05 RX ORDER — CELECOXIB 200 MG/1
200 CAPSULE ORAL DAILY
Qty: 90 CAP | Refills: 1 | Status: SHIPPED | OUTPATIENT
Start: 2018-06-05 | End: 2018-11-12 | Stop reason: ALTCHOICE

## 2018-06-05 NOTE — TELEPHONE ENCOUNTER
V/m left for patient's  notifying requested scripts will be available for  today after 2:00 at the . The celebrex script has been adjusted to show taking once daily, not BID. Office number left if he has additional questions or concerns.

## 2018-06-05 NOTE — TELEPHONE ENCOUNTER
Need the actual scripts for the Omeprazole 20 mg  1 pill daily and the Celecoxib 200 mg and the for the celecoxib should be 1 cap daily - not twice a day. Would like 90 day supply for both -   They need to  by 4:30 pm today as they are [place] for [place] summer and leaving early tomorrow morning.

## 2018-06-06 ENCOUNTER — DOCUMENTATION ONLY (OUTPATIENT)
Dept: INTERNAL MEDICINE CLINIC | Age: 78
End: 2018-06-06

## 2018-08-30 DIAGNOSIS — F51.01 PRIMARY INSOMNIA: ICD-10-CM

## 2018-08-30 RX ORDER — ZALEPLON 10 MG/1
10 CAPSULE ORAL
Qty: 90 CAP | Refills: 0 | OUTPATIENT
Start: 2018-08-30

## 2018-08-30 NOTE — TELEPHONE ENCOUNTER
Notified patient per FDA regulations are are unable to call in controlled prescriptions to another state. Advised would need the original printed script to take out of state. Patient voiced understanding.

## 2018-08-30 NOTE — TELEPHONE ENCOUNTER
Patient states she's on her way to [facility] and left her Zaleplon medication behind. Patient is requesting to have a script sent to [facility] in [facility]. Patient states she's returning back to [facility] in the begin of Oct , requesting enough to last her until she comes back.       Any questions, Patient can be reached at [phone]

## 2018-09-04 DIAGNOSIS — F51.01 PRIMARY INSOMNIA: ICD-10-CM

## 2018-09-04 NOTE — TELEPHONE ENCOUNTER
----- Message from Elias Odell sent at 9/4/2018 12:53 PM EDT -----  Regarding: Dr. Chapin Ku / Rx refill  Contact: [phone]  Pt requested a Rx refill on Sonata 10 mgs, 90 days supply sent to [facility]. @ [phone].

## 2018-09-05 RX ORDER — ZALEPLON 10 MG/1
10 CAPSULE ORAL
Qty: 90 CAP | Refills: 0 | OUTPATIENT
Start: 2018-09-05 | End: 2018-11-12 | Stop reason: SDUPTHER

## 2018-10-09 RX ORDER — GABAPENTIN 300 MG/1
CAPSULE ORAL
Qty: 240 CAP | Refills: 0 | Status: SHIPPED | OUTPATIENT
Start: 2018-10-09 | End: 2018-11-12 | Stop reason: SDUPTHER

## 2018-10-09 NOTE — TELEPHONE ENCOUNTER
Patient needs a refill on her gabapentin (NEURONTIN) 300 mg capsule  #240 takes 8 a day  [facility] [phone]   Her no [phone]

## 2018-11-12 ENCOUNTER — HOSPITAL ENCOUNTER (OUTPATIENT)
Dept: LAB | Age: 78
Discharge: HOME | End: 2018-11-12
Payer: MEDICARE

## 2018-11-12 ENCOUNTER — OFFICE VISIT (OUTPATIENT)
Dept: INTERNAL MEDICINE CLINIC | Age: 78
End: 2018-11-12

## 2018-11-12 VITALS
TEMPERATURE: 97.8 F | HEIGHT: 61 IN | HEART RATE: 80 BPM | WEIGHT: 138 LBS | BODY MASS INDEX: 26.06 KG/M2 | RESPIRATION RATE: 16 BRPM | SYSTOLIC BLOOD PRESSURE: 117 MMHG | OXYGEN SATURATION: 98 % | DIASTOLIC BLOOD PRESSURE: 75 MMHG

## 2018-11-12 DIAGNOSIS — G62.9 NEUROPATHY: ICD-10-CM

## 2018-11-12 DIAGNOSIS — E78.5 DYSLIPIDEMIA, GOAL LDL BELOW 100: ICD-10-CM

## 2018-11-12 DIAGNOSIS — M25.551 PAIN OF RIGHT HIP JOINT: Primary | ICD-10-CM

## 2018-11-12 DIAGNOSIS — F51.01 PRIMARY INSOMNIA: ICD-10-CM

## 2018-11-12 PROCEDURE — 36415 COLL VENOUS BLD VENIPUNCTURE: CPT

## 2018-11-12 PROCEDURE — 80053 COMPREHEN METABOLIC PANEL: CPT

## 2018-11-12 PROCEDURE — 84443 ASSAY THYROID STIM HORMONE: CPT

## 2018-11-12 RX ORDER — OMEPRAZOLE 20 MG/1
20 CAPSULE, DELAYED RELEASE ORAL DAILY
Qty: 90 CAP | Refills: 1 | Status: SHIPPED | OUTPATIENT
Start: 2018-11-12 | End: 2019-06-18 | Stop reason: SDUPTHER

## 2018-11-12 RX ORDER — ZALEPLON 10 MG/1
10 CAPSULE ORAL
Qty: 90 CAP | Refills: 0 | Status: SHIPPED | OUTPATIENT
Start: 2018-11-12 | End: 2019-03-11 | Stop reason: SDUPTHER

## 2018-11-12 RX ORDER — GABAPENTIN 300 MG/1
CAPSULE ORAL
Qty: 240 CAP | Refills: 0 | Status: SHIPPED | OUTPATIENT
Start: 2018-11-12 | End: 2018-12-18 | Stop reason: SDUPTHER

## 2018-11-12 RX ORDER — DULOXETIN HYDROCHLORIDE 60 MG/1
60 CAPSULE, DELAYED RELEASE ORAL DAILY
Qty: 90 CAP | Refills: 1 | Status: SHIPPED | OUTPATIENT
Start: 2018-11-12 | End: 2019-06-21 | Stop reason: SDUPTHER

## 2018-11-12 RX ORDER — CELECOXIB 200 MG/1
200 CAPSULE ORAL DAILY
Qty: 90 CAP | Refills: 1 | Status: CANCELLED | OUTPATIENT
Start: 2018-11-12

## 2018-11-12 NOTE — LETTER
11/13/2018 7:43 PM 
 
Ms. Tricia Collins [address] [place] [id number] Dear Tricia Collins: 
 
Please find your most recent results below. Resulted Orders METABOLIC PANEL, COMPREHENSIVE Result Value Ref Range Glucose 89 65 - 99 mg/dL BUN 30 (H) 8 - 27 mg/dL Creatinine 0.74 0.57 - 1.00 mg/dL GFR est non-AA 79 >59 mL/min/1.73 GFR est AA 91 >59 mL/min/1.73  
 BUN/Creatinine ratio 41 (H) 12 - 28 Sodium 141 134 - 144 mmol/L Potassium 5.0 3.5 - 5.2 mmol/L Chloride 105 96 - 106 mmol/L  
 CO2 19 (L) 20 - 29 mmol/L Calcium 9.9 8.7 - 10.3 mg/dL Protein, total 7.3 6.0 - 8.5 g/dL Albumin 4.6 3.5 - 4.8 g/dL GLOBULIN, TOTAL 2.7 1.5 - 4.5 g/dL A-G Ratio 1.7 1.2 - 2.2 Bilirubin, total <0.2 0.0 - 1.2 mg/dL Alk. phosphatase 74 39 - 117 IU/L  
 AST (SGOT) 22 0 - 40 IU/L  
 ALT (SGPT) 21 0 - 32 IU/L Narrative Performed at:  [address]  [id number] : Júnior Alfonso MD, Phone:  [phone] THYROID PANEL W/TSH Result Value Ref Range TSH 1.220 0.450 - 4.500 uIU/mL T4, Total 4.8 4.5 - 12.0 ug/dL  
 T3 Uptake 27 24 - 39 % Free Thyroxine Index (FTI) 1.3 1.2 - 4.9 Narrative Performed at:  [address]  [id number] : Júnior Alfonso MD, Phone:  [phone] RECOMMENDATIONS: 
None. Keep up the good work! Please call me if you have any questions: [phone] Sincerely, 
 
 
Suzanna Liang MD

## 2018-11-12 NOTE — PROGRESS NOTES
HISTORY OF PRESENT ILLNESS Lisa Moody is a 70 y.o. female. HPI She is seen for med check. Still having hip pain and went to dr Power Nicole and Nena Cea ok and advised to take aleve bid with prilosec-she is still on celebrex and has been using both nsaids and I cautioned vs this as concerned re nephrotoxicity. She is still having neuropathy sxs on high dose gabapentin and cymbalta-no perceived side effects. Uses sonata prn and aware of concerns with risks with this. anxious re her prior breast cancer and asks how she will know if it is back-dr delacruz told her she would know. Review of Systems Constitutional: Positive for malaise/fatigue. Negative for chills, fever and weight loss. Respiratory: Negative for cough, shortness of breath and wheezing. Cardiovascular: Negative for chest pain, palpitations, orthopnea, leg swelling and PND. Gastrointestinal: Negative for heartburn and nausea. Musculoskeletal: Positive for joint pain. Negative for myalgias. Neurological: Positive for sensory change. Negative for dizziness and headaches. Psychiatric/Behavioral: The patient is nervous/anxious and has insomnia. Physical Exam  
Constitutional: She is oriented to person, place, and time. She appears well-developed and well-nourished. HENT:  
Head: Normocephalic and atraumatic. Neck: Normal range of motion. Neck supple. Carotid bruit is not present. No thyromegaly present. Cardiovascular: Normal rate, regular rhythm, S1 normal, S2 normal, normal heart sounds and intact distal pulses. No murmur heard. Pulmonary/Chest: Effort normal and breath sounds normal. No respiratory distress. She has no wheezes. She has no rales. Musculoskeletal: She exhibits no edema. Able to get on and off exam table Neurological: She is alert and oriented to person, place, and time. Psychiatric: She has a normal mood and affect. Her behavior is normal.  
Nursing note and vitals reviewed.  
 
 
ASSESSMENT and PLAN 
 Diagnoses and all orders for this visit: 1. Pain of right hip joint 
-     omeprazole (PRILOSEC) 20 mg capsule; Take 1 Cap by mouth daily. 2. Primary insomnia 
-     zaleplon (SONATA) 10 mg capsule; Take 1 Cap by mouth nightly. Max Daily Amount: 10 mg. 
 
3. Dyslipidemia, goal LDL below 491 
-     METABOLIC PANEL, COMPREHENSIVE 4. Neuropathy 
-     gabapentin (NEURONTIN) 300 mg capsule; TAKE 2 CAPSULES BY MOUTH EVERY MORNING THEN 2 CAPSULES DURING THE DAY AND 4 CAPSULES AT BEDTIME 
-     DULoxetine (CYMBALTA) 60 mg capsule; Take 1 Cap by mouth daily. 
-     THYROID PANEL W/TSH Advised to take only aleve not the celebrex and also advised appt here if needing sonata often and would consider using remeron or trazodone in place for sleep

## 2018-11-13 LAB
ALBUMIN SERPL-MCNC: 4.6 G/DL (ref 3.5–4.8)
ALBUMIN/GLOB SERPL: 1.7 {RATIO} (ref 1.2–2.2)
ALP SERPL-CCNC: 74 IU/L (ref 39–117)
ALT SERPL-CCNC: 21 IU/L (ref 0–32)
AST SERPL-CCNC: 22 IU/L (ref 0–40)
BILIRUB SERPL-MCNC: <0.2 MG/DL (ref 0–1.2)
BUN SERPL-MCNC: 30 MG/DL (ref 8–27)
BUN/CREAT SERPL: 41 (ref 12–28)
CALCIUM SERPL-MCNC: 9.9 MG/DL (ref 8.7–10.3)
CHLORIDE SERPL-SCNC: 105 MMOL/L (ref 96–106)
CO2 SERPL-SCNC: 19 MMOL/L (ref 20–29)
CREAT SERPL-MCNC: 0.74 MG/DL (ref 0.57–1)
FT4I SERPL CALC-MCNC: 1.3 (ref 1.2–4.9)
GLOBULIN SER CALC-MCNC: 2.7 G/DL (ref 1.5–4.5)
GLUCOSE SERPL-MCNC: 89 MG/DL (ref 65–99)
POTASSIUM SERPL-SCNC: 5 MMOL/L (ref 3.5–5.2)
PROT SERPL-MCNC: 7.3 G/DL (ref 6–8.5)
SODIUM SERPL-SCNC: 141 MMOL/L (ref 134–144)
T3RU NFR SERPL: 27 % (ref 24–39)
T4 SERPL-MCNC: 4.8 UG/DL (ref 4.5–12)
TSH SERPL DL<=0.005 MIU/L-ACNC: 1.22 UIU/ML (ref 0.45–4.5)

## 2018-12-18 DIAGNOSIS — G62.9 NEUROPATHY: ICD-10-CM

## 2018-12-18 RX ORDER — GABAPENTIN 300 MG/1
CAPSULE ORAL
Qty: 240 CAP | Refills: 0 | Status: SHIPPED | OUTPATIENT
Start: 2018-12-18 | End: 2019-01-15 | Stop reason: SDUPTHER

## 2019-01-15 DIAGNOSIS — G62.9 NEUROPATHY: ICD-10-CM

## 2019-01-15 RX ORDER — GABAPENTIN 300 MG/1
CAPSULE ORAL
Qty: 240 CAP | Refills: 0 | Status: SHIPPED | OUTPATIENT
Start: 2019-01-15 | End: 2019-02-15 | Stop reason: SDUPTHER

## 2019-02-15 ENCOUNTER — HOSPITAL ENCOUNTER (EMERGENCY)
Age: 79
Discharge: HOME | End: 2019-02-15
Attending: EMERGENCY MEDICINE | Admitting: EMERGENCY MEDICINE
Payer: MEDICARE

## 2019-02-15 ENCOUNTER — APPOINTMENT (OUTPATIENT)
Dept: CT IMAGING | Age: 79
End: 2019-02-15
Attending: NURSE PRACTITIONER
Payer: MEDICARE

## 2019-02-15 VITALS
WEIGHT: 139.99 LBS | TEMPERATURE: 98.4 F | RESPIRATION RATE: 20 BRPM | HEART RATE: 87 BPM | OXYGEN SATURATION: 99 % | SYSTOLIC BLOOD PRESSURE: 137 MMHG | BODY MASS INDEX: 26.45 KG/M2 | DIASTOLIC BLOOD PRESSURE: 72 MMHG
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
## 2019-02-15 DIAGNOSIS — M54.2 CERVICALGIA: ICD-10-CM

## 2019-02-15 DIAGNOSIS — S00.81XA ABRASION OF FACE, INITIAL ENCOUNTER: ICD-10-CM

## 2019-02-15 DIAGNOSIS — S06.0X0A CONCUSSION WITHOUT LOSS OF CONSCIOUSNESS, INITIAL ENCOUNTER: ICD-10-CM

## 2019-02-15 DIAGNOSIS — G62.9 NEUROPATHY: ICD-10-CM

## 2019-02-15 PROCEDURE — 90715 TDAP VACCINE 7 YRS/> IM: CPT | Performed by: NURSE PRACTITIONER

## 2019-02-15 PROCEDURE — 74011250636 HC RX REV CODE- 250/636: Performed by: NURSE PRACTITIONER

## 2019-02-15 PROCEDURE — 74011000250 HC RX REV CODE- 250: Performed by: NURSE PRACTITIONER

## 2019-02-15 PROCEDURE — 90471 IMMUNIZATION ADMIN: CPT

## 2019-02-15 PROCEDURE — 72125 CT NECK SPINE W/O DYE: CPT

## 2019-02-15 PROCEDURE — 99283 EMERGENCY DEPT VISIT LOW MDM: CPT

## 2019-02-15 PROCEDURE — 70450 CT HEAD/BRAIN W/O DYE: CPT

## 2019-02-15 RX ORDER — GABAPENTIN 300 MG/1
CAPSULE ORAL
Qty: 240 CAP | Refills: 0 | Status: SHIPPED | OUTPATIENT
Start: 2019-02-15 | End: 2019-03-11 | Stop reason: SDUPTHER

## 2019-02-15 RX ORDER — BACITRACIN 500 UNIT/G
1 PACKET (EA) TOPICAL
Status: COMPLETED | OUTPATIENT
Start: 2019-02-15 | End: 2019-02-15

## 2019-02-15 RX ADMIN — TETANUS TOXOID, REDUCED DIPHTHERIA TOXOID AND ACELLULAR PERTUSSIS VACCINE, ADSORBED 0.5 ML: 5; 2.5; 8; 8; 2.5 SUSPENSION INTRAMUSCULAR at 18:00

## 2019-02-15 RX ADMIN — BACITRACIN 1 PACKET: 500 OINTMENT TOPICAL at 18:01

## 2019-02-15 NOTE — ED PROVIDER NOTES
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 Patient states she was walking her dogs when the dog pulled her to the concrete sidewalk. She was drank a very short distance. Patient notes facial abrasions and the sides of her neck are tender. Patient denies loss of consciousness, headache, dizziness, nausea, vomiting, chest pain or shortness of breath. Patient was able to get up under her arm power and get home and call her daughter for assistance. Patient is unsure of her last tetanus. Patient has no further complaints at this time. Primary care provider:Steve Gutierrez MD 
 
 
 
The history is provided by the patient and a relative. No  was used. Past Medical History:  
Diagnosis Date  Arthritis  Breast cancer ([facility] [address] 2015  
  left triple neg  Lymphedema   
 left arm  Mitral prolapse  Neurological disorder   
 chemotherapy induced neuropathy  Radiation therapy complication 8370  
 left breast ca Past Surgical History:  
Procedure Laterality Date  COLONOSCOPY N/A 5/31/2018 COLONOSCOPY performed by Radha Day MD at [facility] [address]  COLONOSCOPY,DIAGNOSTIC  5/31/2018  HX BREAST BIOPSY Left yrs  
 neg; stereotactic  HX BREAST LUMPECTOMY Left 2015  HX BREAST REDUCTION Bilateral yrs ago  HX CHOLECYSTECTOMY  HX COLONOSCOPY    
 HX GYN Hysterectomy  HX ORTHOPAEDIC Right   
 surgery on the ankle Family History:  
Problem Relation Age of Onset  Breast Cancer Maternal Grandmother  Heart Disease Mother  Heart Disease Father Social History Socioeconomic History  Marital status:  Spouse name: Not on file  Number of children: Not on file  Years of education: Not on file  Highest education level: Not on file Social Needs  Financial resource strain: Not on file  Food insecurity - worry: Not on file  Food insecurity - inability: Not on file  Transportation needs - medical: Not on file  Transportation needs - non-medical: Not on file Occupational History  Not on file Tobacco Use  Smoking status: Former Smoker  Smokeless tobacco: Never Used Substance and Sexual Activity  Alcohol use: No  
 Drug use: No  
 Sexual activity: Yes  
  Partners: Male Other Topics Concern  Not on file Social History Narrative  Not on file ALLERGIES: Patient has no known allergies. Review of Systems Constitutional: Negative for activity change, appetite change, chills and fever. HENT: Negative. Respiratory: Negative for cough, shortness of breath and wheezing. Cardiovascular: Negative for chest pain. Gastrointestinal: Negative for abdominal pain, constipation, diarrhea, nausea and vomiting. Genitourinary: Negative for dysuria and urgency. Musculoskeletal: Positive for neck pain. Negative for back pain and neck stiffness. Skin: Positive for wound. Negative for color change and rash. Neurological: Negative for dizziness and headaches. Psychiatric/Behavioral: Negative. All other systems reviewed and are negative. Vitals:  
 02/15/19 1745 BP: 141/80 Pulse: 94 Resp: 16 Temp: 98.4 °F (36.9 °C) SpO2: 97% Weight: 63.5 kg (139 lb 15.9 oz) Physical Exam  
Constitutional: She is oriented to person, place, and time. She appears well-developed and well-nourished. HENT:  
Head: Normocephalic. Head is with abrasion. Head is without raccoon's eyes and without Schmitz's sign.   
 
 
Right Ear: Hearing, tympanic membrane, external ear and ear canal normal.  
Left Ear: Hearing, tympanic membrane, external ear and ear canal normal.  
 Nose: Nose normal. No nasal septal hematoma. Mouth/Throat: Uvula is midline, oropharynx is clear and moist and mucous membranes are normal.  
Eyes: Conjunctivae, EOM and lids are normal. Pupils are equal, round, and reactive to light. Neck: Trachea normal, normal range of motion and phonation normal. Neck supple. Muscular tenderness present. No spinous process tenderness present. Normal range of motion present. Paraspinal tenderness. No C,T or L spine bony TTP or step offs. Cardiovascular: Normal rate, regular rhythm, normal heart sounds and intact distal pulses. Pulses: 
     Radial pulses are 2+ on the right side, and 2+ on the left side. Dorsalis pedis pulses are 2+ on the right side, and 2+ on the left side. Pulmonary/Chest: Effort normal and breath sounds normal. No respiratory distress. She has no wheezes. She has no rales. She exhibits no tenderness. Abdominal: Soft. Bowel sounds are normal. There is no tenderness. There is no guarding. Musculoskeletal: Normal range of motion. No rib pain. Pelvis stable. Bilateral leg raise without pain. No change in plantar or dorsal flexion. Neurological: She is alert and oriented to person, place, and time. Skin: Skin is warm and dry. No erythema. Psychiatric: She has a normal mood and affect. Her behavior is normal. Judgment and thought content normal.  
Nursing note and vitals reviewed. MDM Procedures Assessment & Plan:  
 
Orders Placed This Encounter  CT HEAD WITHOUT CONTRAST  CT SPINE CERV WO CONT  diph,Pertuss(AC),Tet Vac-PF (BOOSTRIX) suspension 0.5 mL  bacitracin 500 unit/gram packet 1 Packet Discussed with Robinson Alarcon MD,ED Provider Misty Dozier NP 
02/15/19 
5:47 PM 
 
Labs and imaging without acute findings. Follow-up with PCP. Discussed return precautions. 6:55 PM 
The patient has been reevaluated. The patient is ready for discharge.  The patient's signs, symptoms, diagnosis, and discharge instructions have been discussed and the patient/ family has conveyed their understanding. The patient is to follow up as recommended or return to the ED should their symptoms worsen. Plan has been discussed and the patient is in agreement. LABORATORY TESTS: 
Labs Reviewed - No data to display IMAGING RESULTS: 
Ct Head Wo Cont Result Date: 2/15/2019 EXAM: CT HEAD WO CONT INDICATION: fall with abradions across the nose COMPARISON: None. CONTRAST: None. TECHNIQUE: Unenhanced CT of the head was performed using 5 mm images. Brain and bone windows were generated. CT dose reduction was achieved through use of a standardized protocol tailored for this examination and automatic exposure control for dose modulation. Adaptive statistical iterative reconstruction (ASIR) was utilized. FINDINGS: Ventricles are normal in size. There is no extra-axial fluid collection, hemorrhage or shift. No masses. IMPRESSION: No acute changes.: 
 
Ct Spine Cerv Wo Cont Result Date: 2/15/2019 EXAM:  CT CERVICAL SPINE WITHOUT CONTRAST INDICATION: Recent trauma, spine. COMPARISON: None. CONTRAST:  None. TECHNIQUE: Multislice helical CT of the cervical spine was performed without intravenous contrast administration. Sagittal and coronal reconstructions were generated. CT dose reduction was achieved through use of a standardized protocol tailored for this examination and automatic exposure control for dose modulation. Adaptive statistical iterative reconstruction (ASIR) was utilized. FINDINGS: There is no fracture or dislocation. Alignment is satisfactory. Mild to moderate DJD from C4 to C7 with spur formation slight canal narrowing and foraminal encroachment. IMPRESSION: No acute changes. MEDICATIONS GIVEN: 
Medications diph,Pertuss(AC),Tet Vac-PF (BOOSTRIX) suspension 0.5 mL (0.5 mL IntraMUSCular Given 2/15/19 1800) bacitracin 500 unit/gram packet 1 Packet (1 Packet Topical Given 2/15/19 7961) IMPRESSION: 
1. Fall, initial encounter 2. Cervicalgia 3. Abrasion of face, initial encounter PLAN: 
1. Current Discharge Medication List  
  
CONTINUE these medications which have NOT CHANGED Details DULoxetine (CYMBALTA) 60 mg capsule Take 1 Cap by mouth daily. Qty: 90 Cap, Refills: 1 Associated Diagnoses: Neuropathy  
  
omeprazole (PRILOSEC) 20 mg capsule Take 1 Cap by mouth daily. Qty: 90 Cap, Refills: 1 Associated Diagnoses: Pain of right hip joint  
  
zaleplon (SONATA) 10 mg capsule Take 1 Cap by mouth nightly. Max Daily Amount: 10 mg. 
Qty: 90 Cap, Refills: 0 Associated Diagnoses: Primary insomnia  
  
linaclotide (LINZESS) 290 mcg cap capsule Take 1 Cap by mouth daily for 90 days. Qty: 90 Cap, Refills: 0  
  
meloxicam (MOBIC) 15 mg tablet Take 1 Tab by mouth daily. Qty: 30 Tab, Refills: 5 Associated Diagnoses: Shoulder arthritis  
  
gabapentin (NEURONTIN) 300 mg capsule TAKE TWO CAPSULES BY MOUTH EVERY MORNING, THEN TAKE 2 CAPSULES DURING THE DAY, THEN TAKE 4 CAPSULES AT BEDTIME Qty: 240 Cap, Refills: 0 Associated Diagnoses: Neuropathy  
  
naproxen sodium (ALEVE PO) Take  by mouth. 2.  
Follow-up Information Follow up With Specialties Details Why Contact Info Cara Reddy MD Internal Medicine Schedule an appointment as soon as possible for a visit As needed [address] [place] [address] 
[phone] [facility] EMERGENCY DEPT Emergency Medicine  As needed, If symptoms worsen [address] [facility] 
[phone] 3. Return to ED for new or worsening symptoms Kyler Marr NP

## 2019-02-15 NOTE — ED TRIAGE NOTES
Pt ambulated to the treatment area with a steady gait. Pt states \"about 30minutes ago my dog pulled me down and I fell landing on my knees palms and face on black top. I have pain in my head and on both sides of my neck and face. I did not pass out. \"

## 2019-02-16 DIAGNOSIS — M19.019 SHOULDER ARTHRITIS: ICD-10-CM

## 2019-02-16 NOTE — DISCHARGE INSTRUCTIONS
Thank you for allowing us to care for you today. Please follow-up with your Primary Care provider in the next 2-3 days if your symptoms do not improve. Plan for home:     Tylenol 1000 mg alternating with Ibuprofen 600mg every 6 hours for pain control. Example: 8am take Ibuprofen. 11am take tylenol. 2pm take ibuprofen. 5pm take tylenol. 8pm take ibuprofen. 11pm take tylenol. You may continue this process overnight if you have continued pain/discomfort. WOUND CARE: Abrasions: Wash daily with antibacterial soap like DIAL. Pat dry. Cover in bacitracin to prevent scabs from forming. Scabs will cause scarring. Do this at least daily. Twice daily is preferred. Once your wound has healed you should apply Suncreen over the scar for the next year. This will prevent further scarring while the skin fully heals. Please call, return to the ED or see your Primary Care Provider  if there are signs or symptoms of wound infection: fevers, chills, sweating, fast heartbeat, or wounds with increased warmth, increased redness, malodor (bad smelling), purulent (pus) drainage and/or pain that is increased, new or difficult to control. No Swimming, tub baths or hot tubs until your wounds have healed. You should to follow up with your Primary Care Provider in 7-10 days should your pain not improve. You are going to have aches and pains. This is normal and can persist for up to 2 weeks. Each day your aches and pains should get a little better. You may take ibuprofen (if not taking a blood thinner) or Tylenol as directed on the container as needed for pain. The more your move the better you will feel. For the next 48 hours take a cool bath or shower. This will help prevent muscle engorgement and worsening muscle pain. Come back to the ER if you have worsening symptoms, fevers over 100.9, shaking chills, nausea or vomiting.      Patient Education        Concussion: Care Instructions  Your Care Instructions    A concussion is a kind of injury to the brain. It happens when the head receives a hard blow. The impact can jar or shake the brain against the skull. This interrupts the brain's normal activities. Although you may have cuts or bruises on your head or face, you may have no other visible signs of a brain injury. In most cases, damage to the brain from a concussion can't be seen in tests such as a CT or MRI scan. For a few weeks, you may have low energy, dizziness, trouble sleeping, a headache, ringing in your ears, or nausea. You may also feel anxious, grumpy, or depressed. You may have problems with memory and concentration. These symptoms are common after a concussion. They should slowly improve over time. Sometimes this takes weeks or even months. Someone who lives with you should know how to care for you. Please share this and all information with a caregiver who will be available to help if needed. Follow-up care is a key part of your treatment and safety. Be sure to make and go to all appointments, and call your doctor if you are having problems. It's also a good idea to know your test results and keep a list of the medicines you take. How can you care for yourself at home? Pain control  · Put ice or a cold pack on the part of your head that hurts for 10 to 20 minutes at a time. Put a thin cloth between the ice and your skin. · Be safe with medicines. Read and follow all instructions on the label. ? If the doctor gave you a prescription medicine for pain, take it as prescribed. ? If you are not taking a prescription pain medicine, ask your doctor if you can take an over-the-counter medicine. Recovery  · Follow your doctor's instructions. He or she will tell you if you need someone to watch you closely for the next 24 hours or longer. · Rest is the best way to recover from a concussion. You need to rest your body and your brain:  ? Get plenty of sleep at night.  And take rest breaks during the day.  ? Avoid activities that take a lot of physical or mental work. This includes housework, exercise, schoolwork, video games, text messaging, and using the computer. ? You may need to change your school or work schedule while you recover. ? Return to your normal activities slowly. Do not try to do too much at once. · Do not drink alcohol or use illegal drugs. Alcohol and illegal drugs can slow your recovery. And they can increase your risk of a second brain injury. · Avoid activities that could lead to another concussion. Follow your doctor's instructions for a gradual return to activity and sports. · Ask your doctor when it's okay for you to drive a car, ride a bike, or operate machinery. How should you return to activity? Your return to activity can begin after 1 to 2 days of physical and mental rest. After resting, you can gradually increase your activity as long as it does not cause new symptoms or worsen your symptoms. Doctors and concussion specialists suggest steps to follow for returning to sports after a concussion. Use these steps as a guide. You should slowly progress through the following levels of activity:  1. Limited activity. You can take part in daily activities as long as the activity doesn't increase your symptoms or cause new symptoms. 2. Light aerobic activity. This can include walking, swimming, or other exercise at less than 70% of maximum heart rate. No resistance training is included in this step. 3. Sport-specific exercise. This includes running drills or skating drills (depending on the sport), but no head impact. 4. Noncontact training drills. This includes more complex training drills such as passing. The athlete may also begin light resistance training. 5. Full-contact practice. The athlete can participate in normal training. 6. Return to normal game play. This is the final step and allows the athlete to join in normal game play. Watch and keep track of your progress. It should take at least 6 days for you to go from light activity to normal game play. Make sure that you can stay at each new level of activity for at least 24 hours without symptoms, or as long as your doctor says, before doing more. If one or more symptoms come back, return to a lower level of activity for at least 24 hours. Don't move on until all symptoms are gone. When should you call for help? Call 911 anytime you think you may need emergency care. For example, call if:    · You have a seizure.     · You passed out (lost consciousness).     · You are confused or can't stay awake.    Call your doctor now or seek immediate medical care if:    · You have new or worse vomiting.     · You feel less alert.     · You have new weakness or numbness in any part of your body.    Watch closely for changes in your health, and be sure to contact your doctor if:    · You do not get better as expected.     · You have new symptoms, such as headaches, trouble concentrating, or changes in mood. Where can you learn more? Go to http://jorge-claritza.info/. Enter X904 in the search box to learn more about \"Concussion: Care Instructions. \"  Current as of: Dania 3, 2018  Content Version: 11.9  © 8534-4316 [employer]. Care instructions adapted under license by [employer] (which disclaims liability or warranty for this information). If you have questions about a medical condition or this instruction, always ask your healthcare professional. [place] [address] any warranty or liability for your use of this information. Patient Education        Scrapes (Abrasions): Care Instructions  Your Care Instructions  Scrapes (abrasions) are wounds where your skin has been rubbed or torn off. Most scrapes do not go deep into the skin, but some may remove several layers of skin. Scrapes usually don't bleed much, but they may ooze pinkish fluid.  Scrapes on the head or face may appear worse than they are. They may bleed a lot because of the good blood supply to this area. Most scrapes heal well and may not need a bandage. They usually heal within 3 to 7 days. A large, deep scrape may take 1 to 2 weeks or longer to heal. A scab may form on some scrapes. Follow-up care is a key part of your treatment and safety. Be sure to make and go to all appointments, and call your doctor if you are having problems. It's also a good idea to know your test results and keep a list of the medicines you take. How can you care for yourself at home? · If your doctor told you how to care for your wound, follow your doctor's instructions. If you did not get instructions, follow this general advice:  ? Wash the scrape with clean water 2 times a day. Don't use hydrogen peroxide or alcohol, which can slow healing. ? You may cover the scrape with a thin layer of petroleum jelly, such as Vaseline, and a nonstick bandage. ? Apply more petroleum jelly and replace the bandage as needed. · Prop up the injured area on a pillow anytime you sit or lie down during the next 3 days. Try to keep it above the level of your heart. This will help reduce swelling. · Be safe with medicines. Take pain medicines exactly as directed. ? If the doctor gave you a prescription medicine for pain, take it as prescribed. ? If you are not taking a prescription pain medicine, ask your doctor if you can take an over-the-counter medicine. When should you call for help? Call your doctor now or seek immediate medical care if:    · You have signs of infection, such as:  ? Increased pain, swelling, warmth, or redness around the scrape. ? Red streaks leading from the scrape. ? Pus draining from the scrape. ? A fever.     · The scrape starts to bleed, and blood soaks through the bandage.  Oozing small amounts of blood is normal.    Watch closely for changes in your health, and be sure to contact your doctor if the scrape is not getting better each day. Where can you learn more? Go to http://jorge-claritza.info/. Enter A374 in the search box to learn more about \"Scrapes (Abrasions): Care Instructions. \"  Current as of: September 23, 2018  Content Version: 11.9  © 9181-7701 [employer]. Care instructions adapted under license by [employer] (which disclaims liability or warranty for this information). If you have questions about a medical condition or this instruction, always ask your healthcare professional. [address] any warranty or liability for your use of this information.

## 2019-02-17 RX ORDER — MELOXICAM 15 MG/1
TABLET ORAL
Qty: 30 TAB | Refills: 4 | Status: SHIPPED | OUTPATIENT
Start: 2019-02-17 | End: 2019-06-21 | Stop reason: ALTCHOICE

## 2019-03-11 DIAGNOSIS — F51.01 PRIMARY INSOMNIA: ICD-10-CM

## 2019-03-11 DIAGNOSIS — G62.9 NEUROPATHY: ICD-10-CM

## 2019-03-12 RX ORDER — ZALEPLON 10 MG/1
CAPSULE ORAL
Qty: 90 CAP | Refills: 0 | Status: SHIPPED | OUTPATIENT
Start: 2019-03-12 | End: 2019-06-18 | Stop reason: SDUPTHER

## 2019-03-12 RX ORDER — GABAPENTIN 300 MG/1
CAPSULE ORAL
Qty: 240 CAP | Refills: 0 | Status: SHIPPED | OUTPATIENT
Start: 2019-03-12 | End: 2019-04-18 | Stop reason: SDUPTHER

## 2019-03-22 ENCOUNTER — TELEPHONE (OUTPATIENT)
Dept: INTERNAL MEDICINE CLINIC | Age: 79
End: 2019-03-22

## 2019-03-22 NOTE — TELEPHONE ENCOUNTER
Rx previously approved & printed. Confirmed patient did not pick the script up from the office. Rx called in as written in [facility].

## 2019-03-22 NOTE — TELEPHONE ENCOUNTER
Pt is requesting refill on Sonata 10mg at [address] [place] [address] on file. Pt advised that that [facility] did not fill the medication. Best contact number is [phone].

## 2019-04-18 ENCOUNTER — TELEPHONE (OUTPATIENT)
Dept: INTERNAL MEDICINE CLINIC | Age: 79
End: 2019-04-18

## 2019-04-18 DIAGNOSIS — G62.9 NEUROPATHY: ICD-10-CM

## 2019-04-18 RX ORDER — GABAPENTIN 300 MG/1
CAPSULE ORAL
Qty: 240 CAP | Refills: 0 | Status: SHIPPED | OUTPATIENT
Start: 2019-04-18 | End: 2019-05-16 | Stop reason: SDUPTHER

## 2019-04-18 NOTE — TELEPHONE ENCOUNTER
Patient called following up on refill request. Stated that pharmacy stated they didn't have refills.        Koby Henderson  [address] [phone]

## 2019-06-09 RX ORDER — CELECOXIB 200 MG/1
CAPSULE ORAL
Qty: 60 CAP | Refills: 1 | Status: SHIPPED | OUTPATIENT
Start: 2019-06-09 | End: 2019-06-21 | Stop reason: SDUPTHER

## 2019-06-11 ENCOUNTER — HOSPITAL ENCOUNTER (OUTPATIENT)
Dept: MAMMOGRAPHY | Age: 79
Discharge: HOME | End: 2019-06-11
Attending: INTERNAL MEDICINE
Payer: MEDICARE

## 2019-06-11 DIAGNOSIS — Z85.3 PERSONAL HISTORY OF MALIGNANT NEOPLASM OF BREAST: ICD-10-CM

## 2019-06-11 PROCEDURE — 77062 BREAST TOMOSYNTHESIS BI: CPT

## 2019-06-18 DIAGNOSIS — F51.01 PRIMARY INSOMNIA: ICD-10-CM

## 2019-06-18 DIAGNOSIS — G62.9 NEUROPATHY: ICD-10-CM

## 2019-06-18 DIAGNOSIS — M25.551 PAIN OF RIGHT HIP JOINT: ICD-10-CM

## 2019-06-18 RX ORDER — GABAPENTIN 300 MG/1
CAPSULE ORAL
Qty: 240 CAP | Refills: 0 | Status: SHIPPED | OUTPATIENT
Start: 2019-06-18 | End: 2019-06-21 | Stop reason: SDUPTHER

## 2019-06-18 RX ORDER — ZALEPLON 10 MG/1
CAPSULE ORAL
Qty: 90 CAP | Refills: 0 | Status: SHIPPED | OUTPATIENT
Start: 2019-06-18 | End: 2019-06-21 | Stop reason: SDUPTHER

## 2019-06-18 RX ORDER — OMEPRAZOLE 20 MG/1
CAPSULE, DELAYED RELEASE ORAL
Qty: 90 CAP | Refills: 0 | Status: SHIPPED | OUTPATIENT
Start: 2019-06-18 | End: 2019-06-21 | Stop reason: SDUPTHER

## 2019-06-21 ENCOUNTER — HOSPITAL ENCOUNTER (OUTPATIENT)
Dept: LAB | Age: 79
Discharge: HOME | End: 2019-06-21
Payer: MEDICARE

## 2019-06-21 ENCOUNTER — OFFICE VISIT (OUTPATIENT)
Dept: INTERNAL MEDICINE CLINIC | Age: 79
End: 2019-06-21

## 2019-06-21 VITALS
DIASTOLIC BLOOD PRESSURE: 83 MMHG | WEIGHT: 145 LBS | HEIGHT: 61 IN | SYSTOLIC BLOOD PRESSURE: 126 MMHG | BODY MASS INDEX: 27.38 KG/M2 | TEMPERATURE: 98.1 F | HEART RATE: 69 BPM | RESPIRATION RATE: 16 BRPM | OXYGEN SATURATION: 96 %

## 2019-06-21 DIAGNOSIS — R39.15 URINARY URGENCY: ICD-10-CM

## 2019-06-21 DIAGNOSIS — F51.01 PRIMARY INSOMNIA: Primary | ICD-10-CM

## 2019-06-21 DIAGNOSIS — M25.551 PAIN OF RIGHT HIP JOINT: ICD-10-CM

## 2019-06-21 DIAGNOSIS — K59.09 CHRONIC CONSTIPATION: ICD-10-CM

## 2019-06-21 DIAGNOSIS — G62.9 NEUROPATHY: ICD-10-CM

## 2019-06-21 PROCEDURE — 87086 URINE CULTURE/COLONY COUNT: CPT

## 2019-06-21 PROCEDURE — 85025 COMPLETE CBC W/AUTO DIFF WBC: CPT

## 2019-06-21 PROCEDURE — 36415 COLL VENOUS BLD VENIPUNCTURE: CPT

## 2019-06-21 PROCEDURE — 80053 COMPREHEN METABOLIC PANEL: CPT

## 2019-06-21 PROCEDURE — 84443 ASSAY THYROID STIM HORMONE: CPT

## 2019-06-21 PROCEDURE — 87088 URINE BACTERIA CULTURE: CPT

## 2019-06-21 RX ORDER — ZALEPLON 10 MG/1
CAPSULE ORAL
Qty: 30 CAP | Refills: 0 | Status: SHIPPED | OUTPATIENT
Start: 2019-06-21 | End: 2019-09-16 | Stop reason: SDUPTHER

## 2019-06-21 RX ORDER — GABAPENTIN 300 MG/1
CAPSULE ORAL
Qty: 240 CAP | Refills: 5 | Status: SHIPPED | OUTPATIENT
Start: 2019-06-21 | End: 2019-08-06 | Stop reason: SDUPTHER

## 2019-06-21 RX ORDER — TRAZODONE HYDROCHLORIDE 50 MG/1
50 TABLET ORAL
Qty: 90 TAB | Refills: 1 | Status: SHIPPED | OUTPATIENT
Start: 2019-06-21 | End: 2019-10-14 | Stop reason: ALTCHOICE

## 2019-06-21 RX ORDER — OMEPRAZOLE 20 MG/1
CAPSULE, DELAYED RELEASE ORAL
Qty: 90 CAP | Refills: 1 | Status: SHIPPED | OUTPATIENT
Start: 2019-06-21 | End: 2019-09-16 | Stop reason: SDUPTHER

## 2019-06-21 RX ORDER — DULOXETIN HYDROCHLORIDE 60 MG/1
60 CAPSULE, DELAYED RELEASE ORAL DAILY
Qty: 90 CAP | Refills: 1 | Status: SHIPPED | OUTPATIENT
Start: 2019-06-21 | End: 2019-10-14 | Stop reason: SDUPTHER

## 2019-06-21 RX ORDER — LORAZEPAM 1 MG/1
1 TABLET ORAL
COMMUNITY
End: 2019-06-21 | Stop reason: ALTCHOICE

## 2019-06-21 RX ORDER — CELECOXIB 200 MG/1
CAPSULE ORAL
Qty: 90 CAP | Refills: 1 | Status: SHIPPED | OUTPATIENT
Start: 2019-06-21 | End: 2019-07-14 | Stop reason: SDUPTHER

## 2019-06-21 NOTE — PROGRESS NOTES
HISTORY OF PRESENT ILLNESS  Mariah Bustillo is a 70 y.o. female. HPI  Six month follow up, med check. Issues:  1. Insomnia. She is currently using Sonata, Gabapentin, and has recently started taking Ativan because she is not sleeping through the night. I am very concerned about risk of multiple sedative hypnotics, discussed in detail. Will stop the Ativan, will taper slowly off Sonata and use Trazodone to help with sleep. 2. Neuropathy, managed with Gabapentin and Cymbalta. If she drops her Gabapentin dose she has flare of symptoms. 3. Chronic constipation, much better with Linzess and dietary adjustment. A little bit of lower abdominal pressure. No dysuria or hematuria. No fever. Review of Systems   Constitutional: Negative for chills, fever and weight loss. Respiratory: Negative for cough, shortness of breath and wheezing. Cardiovascular: Negative for chest pain, palpitations, orthopnea, leg swelling and PND. Gastrointestinal: Negative for abdominal pain, heartburn, nausea and vomiting. Genitourinary: Positive for urgency. Negative for dysuria and hematuria. Musculoskeletal: Positive for joint pain. Negative for myalgias. Neurological: Negative for dizziness and headaches. Psychiatric/Behavioral: The patient has insomnia. Physical Exam   Constitutional: She is oriented to person, place, and time. She appears well-developed and well-nourished. HENT:   Head: Normocephalic and atraumatic. Neck: Normal range of motion. Neck supple. Carotid bruit is not present. No thyromegaly present. Cardiovascular: Normal rate, regular rhythm, S1 normal, S2 normal, normal heart sounds and intact distal pulses. No murmur heard. Pulmonary/Chest: Effort normal and breath sounds normal. No respiratory distress. She has no wheezes. She has no rales. Abdominal: Soft. Bowel sounds are normal. There is no tenderness. Musculoskeletal: She exhibits no edema.    Neurological: She is alert and oriented to person, place, and time. Psychiatric: She has a normal mood and affect. Her behavior is normal.   Nursing note and vitals reviewed. ASSESSMENT and PLAN  Diagnoses and all orders for this visit:    1. Primary insomnia  -     zaleplon (SONATA) 10 mg capsule; TAKE ONE CAPSULE BY MOUTH EVERY NIGHT AT BEDTIME  -     traZODone (DESYREL) 50 mg tablet; Take 1 Tab by mouth nightly. 2. Neuropathy  -     gabapentin (NEURONTIN) 300 mg capsule; 2 in am , 2 in pm, 4 qhs  -     DULoxetine (CYMBALTA) 60 mg capsule; Take 1 Cap by mouth daily. 3. Pain of right hip joint  -     omeprazole (PRILOSEC) 20 mg capsule; TAKE ONE CAPSULE BY MOUTH DAILY  -     celecoxib (CELEBREX) 200 mg capsule; TAKE ONE 1 po qd    4. Chronic constipation  -     linaclotide (LINZESS) 290 mcg cap capsule; Take 1 Cap by mouth daily for 90 days.  -     METABOLIC PANEL, COMPREHENSIVE  -     CBC WITH AUTOMATED DIFF  -     TSH 3RD GENERATION    5.  Urinary urgency  -     CULTURE, URINE

## 2019-06-23 LAB
ALBUMIN SERPL-MCNC: 4.8 G/DL (ref 3.5–4.8)
ALBUMIN/GLOB SERPL: 1.6 {RATIO} (ref 1.2–2.2)
ALP SERPL-CCNC: 68 IU/L (ref 39–117)
ALT SERPL-CCNC: 23 IU/L (ref 0–32)
AST SERPL-CCNC: 15 IU/L (ref 0–40)
BACTERIA UR CULT: NO GROWTH
BASOPHILS # BLD AUTO: 0 X10E3/UL (ref 0–0.2)
BASOPHILS NFR BLD AUTO: 0 %
BILIRUB SERPL-MCNC: 0.3 MG/DL (ref 0–1.2)
BUN SERPL-MCNC: 21 MG/DL (ref 8–27)
BUN/CREAT SERPL: 22 (ref 12–28)
CALCIUM SERPL-MCNC: 10.1 MG/DL (ref 8.7–10.3)
CHLORIDE SERPL-SCNC: 99 MMOL/L (ref 96–106)
CO2 SERPL-SCNC: 25 MMOL/L (ref 20–29)
CREAT SERPL-MCNC: 0.95 MG/DL (ref 0.57–1)
EOSINOPHIL # BLD AUTO: 0.1 X10E3/UL (ref 0–0.4)
EOSINOPHIL NFR BLD AUTO: 1 %
ERYTHROCYTE [DISTWIDTH] IN BLOOD BY AUTOMATED COUNT: 13.3 % (ref 12.3–15.4)
GLOBULIN SER CALC-MCNC: 3 G/DL (ref 1.5–4.5)
GLUCOSE SERPL-MCNC: 94 MG/DL (ref 65–99)
HCT VFR BLD AUTO: 37.3 % (ref 34–46.6)
HGB BLD-MCNC: 12.6 G/DL (ref 11.1–15.9)
IMM GRANULOCYTES # BLD AUTO: 0 X10E3/UL (ref 0–0.1)
IMM GRANULOCYTES NFR BLD AUTO: 0 %
INTERPRETATION: NORMAL
LYMPHOCYTES # BLD AUTO: 2 X10E3/UL (ref 0.7–3.1)
LYMPHOCYTES NFR BLD AUTO: 27 %
MCH RBC QN AUTO: 30.7 PG (ref 26.6–33)
MCHC RBC AUTO-ENTMCNC: 33.8 G/DL (ref 31.5–35.7)
MCV RBC AUTO: 91 FL (ref 79–97)
MONOCYTES # BLD AUTO: 0.9 X10E3/UL (ref 0.1–0.9)
MONOCYTES NFR BLD AUTO: 12 %
NEUTROPHILS # BLD AUTO: 4.6 X10E3/UL (ref 1.4–7)
NEUTROPHILS NFR BLD AUTO: 60 %
PLATELET # BLD AUTO: 318 X10E3/UL (ref 150–450)
POTASSIUM SERPL-SCNC: 4.3 MMOL/L (ref 3.5–5.2)
PROT SERPL-MCNC: 7.8 G/DL (ref 6–8.5)
RBC # BLD AUTO: 4.11 X10E6/UL (ref 3.77–5.28)
SODIUM SERPL-SCNC: 138 MMOL/L (ref 134–144)
TSH SERPL DL<=0.005 MIU/L-ACNC: 1.11 UIU/ML (ref 0.45–4.5)
WBC # BLD AUTO: 7.6 X10E3/UL (ref 3.4–10.8)

## 2019-06-27 RX ORDER — MELOXICAM 15 MG/1
TABLET ORAL
Qty: 90 TAB | Refills: 0 | Status: SHIPPED | OUTPATIENT
Start: 2019-06-27 | End: 2019-10-01 | Stop reason: SDUPTHER

## 2019-07-14 DIAGNOSIS — M25.551 PAIN OF RIGHT HIP JOINT: ICD-10-CM

## 2019-07-14 RX ORDER — CELECOXIB 200 MG/1
CAPSULE ORAL
Qty: 180 CAP | Refills: 0 | Status: SHIPPED | OUTPATIENT
Start: 2019-07-14 | End: 2019-08-06 | Stop reason: SDUPTHER

## 2019-08-06 DIAGNOSIS — G62.9 NEUROPATHY: ICD-10-CM

## 2019-08-06 DIAGNOSIS — M25.551 PAIN OF RIGHT HIP JOINT: ICD-10-CM

## 2019-08-07 RX ORDER — CELECOXIB 200 MG/1
CAPSULE ORAL
Qty: 180 CAP | Refills: 1 | Status: SHIPPED | OUTPATIENT
Start: 2019-08-07 | End: 2020-02-21 | Stop reason: SDUPTHER

## 2019-08-07 RX ORDER — GABAPENTIN 300 MG/1
CAPSULE ORAL
Qty: 240 CAP | Refills: 2 | OUTPATIENT
Start: 2019-08-07 | End: 2019-10-14 | Stop reason: SDUPTHER

## 2019-09-16 DIAGNOSIS — M25.551 PAIN OF RIGHT HIP JOINT: ICD-10-CM

## 2019-09-16 DIAGNOSIS — F51.01 PRIMARY INSOMNIA: ICD-10-CM

## 2019-09-16 RX ORDER — OMEPRAZOLE 20 MG/1
CAPSULE, DELAYED RELEASE ORAL
Qty: 90 CAP | Refills: 1 | Status: SHIPPED | OUTPATIENT
Start: 2019-09-16 | End: 2020-02-21 | Stop reason: SDUPTHER

## 2019-09-16 RX ORDER — ZALEPLON 10 MG/1
CAPSULE ORAL
Qty: 30 CAP | Refills: 0 | OUTPATIENT
Start: 2019-09-16 | End: 2019-10-14 | Stop reason: SDUPTHER

## 2019-09-16 NOTE — TELEPHONE ENCOUNTER
Pts  is requesting medications be called into [facility] in [place] as soon as possible. They would like to  medications tomorrow morning. Thanks.
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
## 2019-10-01 RX ORDER — MELOXICAM 15 MG/1
TABLET ORAL
Qty: 90 TAB | Refills: 0 | Status: SHIPPED | OUTPATIENT
Start: 2019-10-01 | End: 2019-10-02 | Stop reason: SDUPTHER

## 2019-10-02 RX ORDER — MELOXICAM 15 MG/1
TABLET ORAL
Qty: 90 TAB | Refills: 0 | Status: SHIPPED | OUTPATIENT
Start: 2019-10-02 | End: 2019-10-14 | Stop reason: ALTCHOICE

## 2019-10-02 NOTE — TELEPHONE ENCOUNTER
Patients  called following up on a refill request for Mobic 15 MG Tablet - 90 tabs     Patient stated that refill was sent to incorrect pharmacy.  requested refill be sent ASAP as patient is going out of town.      Please send to  [facility] [address]  [phone]

## 2019-10-14 ENCOUNTER — OFFICE VISIT (OUTPATIENT)
Dept: INTERNAL MEDICINE CLINIC | Age: 79
End: 2019-10-14

## 2019-10-14 VITALS
TEMPERATURE: 98.5 F | RESPIRATION RATE: 16 BRPM | SYSTOLIC BLOOD PRESSURE: 126 MMHG | HEART RATE: 68 BPM | DIASTOLIC BLOOD PRESSURE: 79 MMHG | BODY MASS INDEX: 27.56 KG/M2 | HEIGHT: 61 IN | WEIGHT: 146 LBS | OXYGEN SATURATION: 97 %

## 2019-10-14 DIAGNOSIS — F51.01 PRIMARY INSOMNIA: Primary | ICD-10-CM

## 2019-10-14 DIAGNOSIS — G62.9 NEUROPATHY: ICD-10-CM

## 2019-10-14 DIAGNOSIS — K59.09 CHRONIC CONSTIPATION: ICD-10-CM

## 2019-10-14 RX ORDER — GABAPENTIN 300 MG/1
CAPSULE ORAL
Qty: 240 CAP | Refills: 2 | Status: SHIPPED | OUTPATIENT
Start: 2019-10-14 | End: 2019-12-22

## 2019-10-14 RX ORDER — ZALEPLON 10 MG/1
CAPSULE ORAL
Qty: 30 CAP | Refills: 4 | Status: SHIPPED | OUTPATIENT
Start: 2019-10-14 | End: 2019-11-14

## 2019-10-14 RX ORDER — DULOXETIN HYDROCHLORIDE 60 MG/1
60 CAPSULE, DELAYED RELEASE ORAL DAILY
Qty: 90 CAP | Refills: 1 | Status: SHIPPED | OUTPATIENT
Start: 2019-10-14 | End: 2020-04-10 | Stop reason: SDUPTHER

## 2019-10-14 NOTE — PROGRESS NOTES
HISTORY OF PRESENT ILLNESS  Russell Araiza is a 70 y.o. female. HPI  Seen primarily for med check. Issues:  1. Chronic insomnia. She did not feel Trazodone was helpful. She has been using Sonata 10 mg nightly and we discussed risks of memory loss with this and indication to really try to back off at least once a week. 2. Chronic constipation. Lorena Cola has been helpful. No side effects. Would like to continue. 3. Arthritis. Has been given Meloxicam by another provider. Discussed not to combine the Celebrex and Meloxicam.  At this point will stick with Celebrex. 4. Chronic neuropathy. Has been on Gabapentin, high doses long term, as well as Cymbalta 60 mg a day, and feels these are helpful. She is not having edema. Denies any cardiac symptoms. Review of Systems   Constitutional: Negative for chills, diaphoresis, fever, malaise/fatigue and weight loss. Respiratory: Negative for cough, shortness of breath and wheezing. Cardiovascular: Negative for chest pain, palpitations, orthopnea, leg swelling and PND. Gastrointestinal: Negative for abdominal pain, constipation, diarrhea, heartburn, nausea and vomiting. Musculoskeletal: Negative for myalgias. Neurological: Positive for sensory change (feet). Negative for dizziness, focal weakness and headaches. Psychiatric/Behavioral: The patient has insomnia. Physical Exam   Constitutional: She is oriented to person, place, and time. She appears well-developed and well-nourished. HENT:   Head: Normocephalic and atraumatic. Neck: Normal range of motion. Neck supple. Carotid bruit is not present. No thyromegaly present. Cardiovascular: Normal rate, regular rhythm, S1 normal, S2 normal, normal heart sounds and intact distal pulses. No murmur heard. Pulmonary/Chest: Effort normal and breath sounds normal. No respiratory distress. She has no wheezes. She has no rales. Musculoskeletal: She exhibits no edema.    Neurological: She is alert and oriented to person, place, and time. Psychiatric: She has a normal mood and affect. Her behavior is normal.   Nursing note and vitals reviewed. ASSESSMENT and PLAN  Diagnoses and all orders for this visit:    1. Primary insomnia  -     zaleplon (SONATA) 10 mg capsule; TAKE ONE CAPSULE BY MOUTH EVERY NIGHT AT BEDTIME    2. Neuropathy  -     gabapentin (NEURONTIN) 300 mg capsule; 2 in am , 2 in pm, 4 qhs  -     DULoxetine (CYMBALTA) 60 mg capsule; Take 1 Cap by mouth daily. 3. Chronic constipation  -     linaCLOtide (LINZESS) 290 mcg cap capsule; Take 1 Cap by mouth daily for 115 days.       the following changes in treatment are made: do not combine mobic and celebrex  Dec sonata use to skip at least once a week  appt in [place]

## 2019-11-14 ENCOUNTER — APPOINTMENT (OUTPATIENT)
Dept: CT IMAGING | Age: 79
End: 2019-11-14
Attending: EMERGENCY MEDICINE
Payer: MEDICARE

## 2019-11-14 ENCOUNTER — HOSPITAL ENCOUNTER (EMERGENCY)
Age: 79
Discharge: HOME | End: 2019-11-14
Attending: EMERGENCY MEDICINE
Payer: MEDICARE

## 2019-11-14 VITALS
WEIGHT: 137 LBS | SYSTOLIC BLOOD PRESSURE: 122 MMHG | HEIGHT: 60 IN | OXYGEN SATURATION: 97 % | RESPIRATION RATE: 20 BRPM | BODY MASS INDEX: 26.9 KG/M2 | HEART RATE: 72 BPM | TEMPERATURE: 97.7 F | DIASTOLIC BLOOD PRESSURE: 98 MMHG

## 2019-11-14 DIAGNOSIS — R10.9 FLANK PAIN: Primary | ICD-10-CM

## 2019-11-14 LAB
ALBUMIN SERPL-MCNC: 4.3 G/DL (ref 3.5–5)
ALBUMIN/GLOB SERPL: 1.1 {RATIO} (ref 1.1–2.2)
ALP SERPL-CCNC: 68 U/L (ref 45–117)
ALT SERPL-CCNC: 23 U/L (ref 12–78)
ANION GAP SERPL CALC-SCNC: 10 MMOL/L (ref 5–15)
APPEARANCE UR: CLEAR
AST SERPL-CCNC: 15 U/L (ref 15–37)
BACTERIA URNS QL MICRO: NEGATIVE /HPF
BASOPHILS # BLD: 0 K/UL (ref 0–0.1)
BASOPHILS NFR BLD: 0 % (ref 0–1)
BILIRUB SERPL-MCNC: 0.3 MG/DL (ref 0.2–1)
BILIRUB UR QL: NEGATIVE
BUN SERPL-MCNC: 31 MG/DL (ref 6–20)
BUN/CREAT SERPL: 28 (ref 12–20)
CALCIUM SERPL-MCNC: 9.4 MG/DL (ref 8.5–10.1)
CHLORIDE SERPL-SCNC: 103 MMOL/L (ref 97–108)
CO2 SERPL-SCNC: 25 MMOL/L (ref 21–32)
COLOR UR: NORMAL
COMMENT, HOLDF: NORMAL
CREAT SERPL-MCNC: 1.12 MG/DL (ref 0.55–1.02)
DIFFERENTIAL METHOD BLD: ABNORMAL
EOSINOPHIL # BLD: 0.1 K/UL (ref 0–0.4)
EOSINOPHIL NFR BLD: 2 % (ref 0–7)
EPITH CASTS URNS QL MICRO: NORMAL /LPF
ERYTHROCYTE [DISTWIDTH] IN BLOOD BY AUTOMATED COUNT: 13.4 % (ref 11.5–14.5)
GLOBULIN SER CALC-MCNC: 3.8 G/DL (ref 2–4)
GLUCOSE SERPL-MCNC: 122 MG/DL (ref 65–100)
GLUCOSE UR STRIP.AUTO-MCNC: NEGATIVE MG/DL
HCT VFR BLD AUTO: 38.6 % (ref 35–47)
HGB BLD-MCNC: 12.5 G/DL (ref 11.5–16)
HGB UR QL STRIP: NEGATIVE
KETONES UR QL STRIP.AUTO: NEGATIVE MG/DL
LEUKOCYTE ESTERASE UR QL STRIP.AUTO: NEGATIVE
LIPASE SERPL-CCNC: 101 U/L (ref 73–393)
LYMPHOCYTES # BLD: 1 K/UL (ref 0.8–3.5)
LYMPHOCYTES NFR BLD: 14 % (ref 12–49)
MCH RBC QN AUTO: 30.2 PG (ref 26–34)
MCHC RBC AUTO-ENTMCNC: 32.4 G/DL (ref 30–36.5)
MCV RBC AUTO: 93.2 FL (ref 80–99)
MONOCYTES # BLD: 0.6 K/UL (ref 0–1)
MONOCYTES NFR BLD: 8 % (ref 5–13)
NEUTS SEG # BLD: 5.7 K/UL (ref 1.8–8)
NEUTS SEG NFR BLD: 76 % (ref 32–75)
NITRITE UR QL STRIP.AUTO: NEGATIVE
PH UR STRIP: 5.5 [PH] (ref 5–8)
PLATELET # BLD AUTO: 240 K/UL (ref 150–400)
PMV BLD AUTO: 9.6 FL (ref 8.9–12.9)
POTASSIUM SERPL-SCNC: 4.2 MMOL/L (ref 3.5–5.1)
PROT SERPL-MCNC: 8.1 G/DL (ref 6.4–8.2)
PROT UR STRIP-MCNC: NEGATIVE MG/DL
RBC # BLD AUTO: 4.14 M/UL (ref 3.8–5.2)
RBC #/AREA URNS HPF: NORMAL /HPF (ref 0–5)
SAMPLES BEING HELD,HOLD: NORMAL
SODIUM SERPL-SCNC: 138 MMOL/L (ref 136–145)
SP GR UR REFRACTOMETRY: 1.01 (ref 1–1.03)
UR CULT HOLD, URHOLD: NORMAL
UROBILINOGEN UR QL STRIP.AUTO: 0.2 EU/DL (ref 0.2–1)
WBC # BLD AUTO: 7.4 K/UL (ref 3.6–11)
WBC URNS QL MICRO: NORMAL /HPF (ref 0–4)

## 2019-11-14 PROCEDURE — 83690 ASSAY OF LIPASE: CPT

## 2019-11-14 PROCEDURE — 81001 URINALYSIS AUTO W/SCOPE: CPT

## 2019-11-14 PROCEDURE — 80053 COMPREHEN METABOLIC PANEL: CPT

## 2019-11-14 PROCEDURE — 85025 COMPLETE CBC W/AUTO DIFF WBC: CPT

## 2019-11-14 PROCEDURE — 96375 TX/PRO/DX INJ NEW DRUG ADDON: CPT

## 2019-11-14 PROCEDURE — 36415 COLL VENOUS BLD VENIPUNCTURE: CPT

## 2019-11-14 PROCEDURE — 74011250636 HC RX REV CODE- 250/636: Performed by: EMERGENCY MEDICINE

## 2019-11-14 PROCEDURE — 74176 CT ABD & PELVIS W/O CONTRAST: CPT

## 2019-11-14 PROCEDURE — 99284 EMERGENCY DEPT VISIT MOD MDM: CPT

## 2019-11-14 PROCEDURE — 96374 THER/PROPH/DIAG INJ IV PUSH: CPT

## 2019-11-14 RX ORDER — MORPHINE SULFATE 4 MG/ML
4 INJECTION INTRAVENOUS
Status: COMPLETED | OUTPATIENT
Start: 2019-11-14 | End: 2019-11-14

## 2019-11-14 RX ORDER — TRAZODONE HYDROCHLORIDE 50 MG/1
50 TABLET ORAL
COMMUNITY
End: 2020-03-06 | Stop reason: ALTCHOICE

## 2019-11-14 RX ORDER — KETOROLAC TROMETHAMINE 30 MG/ML
15 INJECTION, SOLUTION INTRAMUSCULAR; INTRAVENOUS
Status: COMPLETED | OUTPATIENT
Start: 2019-11-14 | End: 2019-11-14

## 2019-11-14 RX ADMIN — KETOROLAC TROMETHAMINE 15 MG: 30 INJECTION, SOLUTION INTRAMUSCULAR at 11:19

## 2019-11-14 RX ADMIN — MORPHINE SULFATE 4 MG: 4 INJECTION INTRAVENOUS at 11:20

## 2019-11-14 NOTE — ED NOTES
Patient medicated with ordered IV morphine and toradol. Tolerated well. [place] given for comfort. Call bell remains in reach. Vitals updated.

## 2019-11-14 NOTE — ED TRIAGE NOTES
Family member rpts pt with hx of chronic constipation. Began this am with RLQ pain radiating to her back. Recent car travel to [place] and a lot of heavy lifting.

## 2019-11-14 NOTE — ED NOTES
IV access established and blood samples obtained for ordered testing. Patient tolerated well. Patient updated regarding plan of care and associated time constraints; verbalizes understanding and agreement. Call bell in reach. Spouse at bedside.

## 2019-11-14 NOTE — ED PROVIDER NOTES
73-year-old female with a history of breast cancer presents with right flank pain and right lower quadrant pain. Started this morning. Has a history of chronic constipation. She had a recent car travel to [place] and has been doing a lot of heavy lifting. She denies any changes in her urination she has had no fevers or chills. She has had nausea but no vomiting. She has had a history of cholecystectomy but still has her appendix. There are no other medical concerns at this time. Abdominal Pain    Pertinent negatives include no fever, no headaches, no chest pain and no back pain.         Past Medical History:   Diagnosis Date    Arthritis     Breast cancer ([facility] [address] 2015     left triple neg    Lymphedema     left arm    Mitral prolapse     Neurological disorder     chemotherapy induced neuropathy    Radiation therapy complication 7015    left breast ca       Past Surgical History:   Procedure Laterality Date    COLONOSCOPY N/A 5/31/2018    COLONOSCOPY performed by Yoanna Jones MD at [address]  5/31/2018         HX BREAST BIOPSY Left yrs    neg; stereotactic    HX BREAST LUMPECTOMY Left 2015    HX BREAST REDUCTION Bilateral yrs ago    HX CHOLECYSTECTOMY      HX COLONOSCOPY      HX GYN      Hysterectomy    HX ORTHOPAEDIC Right     surgery on the ankle         Family History:   Problem Relation Age of Onset    Breast Cancer Maternal Grandmother     Heart Disease Mother     Heart Disease Father        Social History     Socioeconomic History    Marital status:      Spouse name: Not on file    Number of children: Not on file    Years of education: Not on file    Highest education level: Not on file   Occupational History    Not on file   Social Needs    Financial resource strain: Not on file    Food insecurity:     Worry: Not on file     Inability: Not on file    Transportation needs:     Medical: Not on file     Non-medical: Not on file   Tobacco Use    Smoking status: Former Smoker    Smokeless tobacco: Never Used   Substance and Sexual Activity    Alcohol use: No    Drug use: No    Sexual activity: Yes     Partners: Male   Lifestyle    Physical activity:     Days per week: Not on file     Minutes per session: Not on file    Stress: Not on file   Relationships    Social connections:     Talks on phone: Not on file     Gets together: Not on file     Attends [employer] service: Not on file     Active member of club or organization: Not on file     Attends meetings of clubs or organizations: Not on file     Relationship status: Not on file    Intimate partner violence:     Fear of current or ex partner: Not on file     Emotionally abused: Not on file     Physically abused: Not on file     Forced sexual activity: Not on file   Other Topics Concern    Not on file   Social History Narrative    Not on file         ALLERGIES: Patient has no known allergies. Review of Systems   Constitutional: Negative for chills and fever. HENT: Negative for ear pain and sore throat. Eyes: Negative for pain. Respiratory: Negative for chest tightness and shortness of breath. Cardiovascular: Negative for chest pain. Gastrointestinal: Positive for abdominal pain. Genitourinary: Negative for flank pain. Musculoskeletal: Negative for back pain. Skin: Negative for rash. Neurological: Negative for headaches. All other systems reviewed and are negative. Vitals:    11/14/19 1130 11/14/19 1200 11/14/19 1243 11/14/19 1244   BP: 163/84 162/83 (!) 122/98    Pulse:       Resp:       Temp:       SpO2: 98% 92%  97%   Weight:       Height:                Physical Exam   Constitutional: No distress. HENT:   Head: Normocephalic and atraumatic. Mouth/Throat: Oropharynx is clear and moist.   Eyes: Pupils are equal, round, and reactive to light. Conjunctivae are normal. No scleral icterus. Neck: Neck supple. No tracheal deviation present.    Cardiovascular: Normal rate, regular rhythm and intact distal pulses. Pulmonary/Chest: Effort normal. No respiratory distress. She has no wheezes. She has no rales. Abdominal: Soft. She exhibits no distension. There is no tenderness. Genitourinary:   Genitourinary Comments: deferred   Musculoskeletal: She exhibits no edema or deformity. Neurological: She is alert. Skin: Skin is warm and dry. Psychiatric: She has a normal mood and affect. Nursing note and vitals reviewed. MDM  Number of Diagnoses or Management Options  Flank pain:   Diagnosis management comments: 22-year-old female with a history of cholecystectomy presents with right flank and right lower quadrant pain with differential diagnosis of kidney stone, UTI, appendicitis. Patient with minimal or no tenderness on exam which makes kidney stone more likely. Labs performed and unremarkable. Urinalysis shows no blood or pyuria. CT scan performed without contrast due to higher likelihood of kidney stone showed no acute abnormality. Advised to follow-up closely with primary care doctor. ED Course as of Nov 14 1526   Thu Nov 14, 2019   1300 Urinalysis normal.  Blood work unremarkable. CAT scan showed normal appendix and no obstructing stone. Unclear etiology of patient's pain. Likely musculoskeletal versus radicular pain. Advised Tylenol and Motrin for pain at home. Follow-up closely with PCP. Given return precautions for new or worsening symptoms.     [TT]      ED Course User Index  [TT] Sheridan Milian MD       Procedures

## 2019-11-15 ENCOUNTER — OFFICE VISIT (OUTPATIENT)
Dept: INTERNAL MEDICINE CLINIC | Age: 79
End: 2019-11-15

## 2019-11-15 VITALS
HEIGHT: 60 IN | WEIGHT: 139 LBS | DIASTOLIC BLOOD PRESSURE: 60 MMHG | RESPIRATION RATE: 18 BRPM | HEART RATE: 69 BPM | TEMPERATURE: 98.6 F | BODY MASS INDEX: 27.29 KG/M2 | SYSTOLIC BLOOD PRESSURE: 110 MMHG | OXYGEN SATURATION: 96 %

## 2019-11-15 DIAGNOSIS — R10.9 RIGHT SIDED ABDOMINAL PAIN: Primary | ICD-10-CM

## 2019-11-15 RX ORDER — TIZANIDINE 2 MG/1
2 TABLET ORAL
Qty: 10 TAB | Refills: 0 | Status: SHIPPED | OUTPATIENT
Start: 2019-11-15 | End: 2019-11-22

## 2019-11-15 NOTE — PROGRESS NOTES
Subjective   Chief Complaint   Right-sided abdominal pain    Radha Garcia is a 70 y.o. female     68-year-old female with a history of chronic constipation on Linzess who presents to clinic as a follow-up for an ER visit yesterday. Patient reports that she developed right-sided flank and abdominal pain that almost made her cry. Has been taking ibuprofen and using icy hot. Had a CT abdomen and pelvis done in the ED which was negative for anything acute although it did show some right-sided stool burden. The patient reports some continued abdominal pain. Denies any nausea, vomiting, diarrhea, dysuria, hematuria. Denies any fever, chills. Recently was lifting a patient. Review of Systems   Constitutional: Negative for chills and fever. Respiratory: Negative for shortness of breath. Cardiovascular: Negative for chest pain. Gastrointestinal: Positive for abdominal pain. Objective   Vitals:       Visit Vitals  /60   Pulse 69   Temp 98.6 °F (37 °C) (Oral)   Resp 18   Ht 5' (1.524 m)   Wt 139 lb (63 kg)   SpO2 96%   BMI 27.15 kg/m²        Physical Exam   Constitutional: She is well-developed, well-nourished, and in no distress. No distress. Cardiovascular: Normal rate. Pulmonary/Chest: Effort normal.   Abdominal: Soft. Bowel sounds are normal. She exhibits no distension. There is no tenderness. Assessment and Plan   Diagnoses and all orders for this visit:    1. Right sided abdominal pain  -     tiZANidine (ZANAFLEX) 2 mg tablet; Take 1 Tab by mouth three (3) times daily as needed for Pain for up to 7 days. CT abdomen was negative. Did have some stool burden so not sure if that is contributing. Recommend taking a dose of Linzess. Instructed to give us a call if her symptoms do not improve By next week      Benefits, risks, possible drug interactions, and side effects of all new medications were reviewed with the patient. Pt verbalized understanding.     Return to clinic: As needed    Preethi Rodriguez MD  [facility]  11/15/2019    Future Appointments   Date Time Provider Dorinda Jeffery   1/6/2020  1:30 PM Bhaskar Huynh MD [address] [address]

## 2019-11-16 ENCOUNTER — HOSPITAL ENCOUNTER (EMERGENCY)
Age: 79
Discharge: HOME | End: 2019-11-16
Attending: EMERGENCY MEDICINE
Payer: MEDICARE

## 2019-11-16 ENCOUNTER — APPOINTMENT (OUTPATIENT)
Dept: GENERAL RADIOLOGY | Age: 79
End: 2019-11-16
Attending: EMERGENCY MEDICINE
Payer: MEDICARE

## 2019-11-16 VITALS
DIASTOLIC BLOOD PRESSURE: 81 MMHG | RESPIRATION RATE: 16 BRPM | OXYGEN SATURATION: 98 % | WEIGHT: 137 LBS | TEMPERATURE: 98.2 F | HEIGHT: 61 IN | BODY MASS INDEX: 25.86 KG/M2 | HEART RATE: 74 BPM | SYSTOLIC BLOOD PRESSURE: 133 MMHG

## 2019-11-16 DIAGNOSIS — R10.84 ABDOMINAL PAIN, GENERALIZED: Primary | ICD-10-CM

## 2019-11-16 DIAGNOSIS — N30.00 ACUTE CYSTITIS WITHOUT HEMATURIA: ICD-10-CM

## 2019-11-16 DIAGNOSIS — K59.00 CONSTIPATION, UNSPECIFIED CONSTIPATION TYPE: ICD-10-CM

## 2019-11-16 LAB
ALBUMIN SERPL-MCNC: 3.9 G/DL (ref 3.5–5)
ALBUMIN/GLOB SERPL: 1.1 {RATIO} (ref 1.1–2.2)
ALP SERPL-CCNC: 67 U/L (ref 45–117)
ALT SERPL-CCNC: 59 U/L (ref 12–78)
ANION GAP SERPL CALC-SCNC: 8 MMOL/L (ref 5–15)
APPEARANCE UR: CLEAR
AST SERPL-CCNC: 27 U/L (ref 15–37)
BACTERIA URNS QL MICRO: ABNORMAL /HPF
BASOPHILS # BLD: 0 K/UL (ref 0–0.1)
BASOPHILS NFR BLD: 0 % (ref 0–1)
BILIRUB DIRECT SERPL-MCNC: 0.1 MG/DL (ref 0–0.2)
BILIRUB SERPL-MCNC: 0.3 MG/DL (ref 0.2–1)
BILIRUB UR QL: NEGATIVE
BUN SERPL-MCNC: 26 MG/DL (ref 6–20)
BUN/CREAT SERPL: 22 (ref 12–20)
CALCIUM SERPL-MCNC: 9.4 MG/DL (ref 8.5–10.1)
CHLORIDE SERPL-SCNC: 100 MMOL/L (ref 97–108)
CO2 SERPL-SCNC: 25 MMOL/L (ref 21–32)
COLOR UR: ABNORMAL
COMMENT, HOLDF: NORMAL
CREAT SERPL-MCNC: 1.18 MG/DL (ref 0.55–1.02)
DIFFERENTIAL METHOD BLD: NORMAL
EOSINOPHIL # BLD: 0.1 K/UL (ref 0–0.4)
EOSINOPHIL NFR BLD: 2 % (ref 0–7)
EPITH CASTS URNS QL MICRO: ABNORMAL /LPF
ERYTHROCYTE [DISTWIDTH] IN BLOOD BY AUTOMATED COUNT: 13.4 % (ref 11.5–14.5)
GLOBULIN SER CALC-MCNC: 3.6 G/DL (ref 2–4)
GLUCOSE SERPL-MCNC: 107 MG/DL (ref 65–100)
GLUCOSE UR STRIP.AUTO-MCNC: NEGATIVE MG/DL
HCT VFR BLD AUTO: 37.2 % (ref 35–47)
HGB BLD-MCNC: 12.1 G/DL (ref 11.5–16)
HGB UR QL STRIP: NEGATIVE
KETONES UR QL STRIP.AUTO: NEGATIVE MG/DL
LEUKOCYTE ESTERASE UR QL STRIP.AUTO: ABNORMAL
LIPASE SERPL-CCNC: 107 U/L (ref 73–393)
LYMPHOCYTES # BLD: 1.4 K/UL (ref 0.8–3.5)
LYMPHOCYTES NFR BLD: 27 % (ref 12–49)
MCH RBC QN AUTO: 30 PG (ref 26–34)
MCHC RBC AUTO-ENTMCNC: 32.5 G/DL (ref 30–36.5)
MCV RBC AUTO: 92.1 FL (ref 80–99)
MONOCYTES # BLD: 0.5 K/UL (ref 0–1)
MONOCYTES NFR BLD: 10 % (ref 5–13)
NEUTS SEG # BLD: 3.2 K/UL (ref 1.8–8)
NEUTS SEG NFR BLD: 61 % (ref 32–75)
NITRITE UR QL STRIP.AUTO: NEGATIVE
PH UR STRIP: 7.5 [PH] (ref 5–8)
PLATELET # BLD AUTO: 248 K/UL (ref 150–400)
PMV BLD AUTO: 9.2 FL (ref 8.9–12.9)
POTASSIUM SERPL-SCNC: 4.1 MMOL/L (ref 3.5–5.1)
PROT SERPL-MCNC: 7.5 G/DL (ref 6.4–8.2)
PROT UR STRIP-MCNC: NEGATIVE MG/DL
RBC # BLD AUTO: 4.04 M/UL (ref 3.8–5.2)
RBC #/AREA URNS HPF: ABNORMAL /HPF (ref 0–5)
SAMPLES BEING HELD,HOLD: NORMAL
SODIUM SERPL-SCNC: 133 MMOL/L (ref 136–145)
SP GR UR REFRACTOMETRY: 1.01 (ref 1–1.03)
UR CULT HOLD, URHOLD: NORMAL
UROBILINOGEN UR QL STRIP.AUTO: 0.2 EU/DL (ref 0.2–1)
WBC # BLD AUTO: 5.2 K/UL (ref 3.6–11)
WBC URNS QL MICRO: ABNORMAL /HPF (ref 0–4)

## 2019-11-16 PROCEDURE — 80048 BASIC METABOLIC PNL TOTAL CA: CPT

## 2019-11-16 PROCEDURE — 83690 ASSAY OF LIPASE: CPT

## 2019-11-16 PROCEDURE — 99284 EMERGENCY DEPT VISIT MOD MDM: CPT

## 2019-11-16 PROCEDURE — 81001 URINALYSIS AUTO W/SCOPE: CPT

## 2019-11-16 PROCEDURE — 74011250637 HC RX REV CODE- 250/637: Performed by: EMERGENCY MEDICINE

## 2019-11-16 PROCEDURE — 85025 COMPLETE CBC W/AUTO DIFF WBC: CPT

## 2019-11-16 PROCEDURE — 96374 THER/PROPH/DIAG INJ IV PUSH: CPT

## 2019-11-16 PROCEDURE — 80076 HEPATIC FUNCTION PANEL: CPT

## 2019-11-16 PROCEDURE — 36415 COLL VENOUS BLD VENIPUNCTURE: CPT

## 2019-11-16 PROCEDURE — 74022 RADEX COMPL AQT ABD SERIES: CPT

## 2019-11-16 PROCEDURE — 74011250636 HC RX REV CODE- 250/636: Performed by: EMERGENCY MEDICINE

## 2019-11-16 PROCEDURE — 87086 URINE CULTURE/COLONY COUNT: CPT

## 2019-11-16 RX ORDER — SODIUM CHLORIDE 0.9 % (FLUSH) 0.9 %
5-40 SYRINGE (ML) INJECTION AS NEEDED
Status: DISCONTINUED | OUTPATIENT
Start: 2019-11-16 | End: 2019-11-17 | Stop reason: HOSPADM

## 2019-11-16 RX ORDER — SODIUM CHLORIDE 0.9 % (FLUSH) 0.9 %
5-40 SYRINGE (ML) INJECTION EVERY 8 HOURS
Status: DISCONTINUED | OUTPATIENT
Start: 2019-11-16 | End: 2019-11-17 | Stop reason: HOSPADM

## 2019-11-16 RX ORDER — CEPHALEXIN 250 MG/1
500 CAPSULE ORAL
Status: COMPLETED | OUTPATIENT
Start: 2019-11-16 | End: 2019-11-16

## 2019-11-16 RX ORDER — CEPHALEXIN 500 MG/1
500 CAPSULE ORAL 3 TIMES DAILY
Qty: 21 CAP | Refills: 0 | Status: SHIPPED | OUTPATIENT
Start: 2019-11-16 | End: 2019-11-23

## 2019-11-16 RX ORDER — KETOROLAC TROMETHAMINE 30 MG/ML
15 INJECTION, SOLUTION INTRAMUSCULAR; INTRAVENOUS
Status: COMPLETED | OUTPATIENT
Start: 2019-11-16 | End: 2019-11-16

## 2019-11-16 RX ADMIN — SODIUM CHLORIDE 1000 ML: 900 INJECTION, SOLUTION INTRAVENOUS at 20:43

## 2019-11-16 RX ADMIN — CEPHALEXIN 500 MG: 250 CAPSULE ORAL at 22:03

## 2019-11-16 RX ADMIN — KETOROLAC TROMETHAMINE 15 MG: 30 INJECTION, SOLUTION INTRAMUSCULAR at 20:43

## 2019-11-17 NOTE — DISCHARGE INSTRUCTIONS
We hope that we have addressed all of your medical concerns. The examination and treatment you received in the Emergency Department were for an emergent problem and were not intended as complete care. It is important that you follow up with your healthcare provider(s) for ongoing care. If your symptoms worsen or do not improve as expected, and you are unable to reach your usual health care provider(s), you should return to the Emergency Department. Today's healthcare is undergoing tremendous change, and patient satisfaction surveys are one of the many tools to assess the quality of medical care. You may receive a survey from the [employer] regarding your experience in the Emergency Department. I hope that your experience has been completely positive, particularly the medical care that I provided. As such, please participate in the survey; anything less than excellent does not meet my expectations or intentions. Formerly [facility] and [address] [place] [address] participate in nationally recognized quality of care measures. If your blood pressure is greater than 120/80, as reported below, we urge that you seek medical care to address the potential of high blood pressure, commonly known as hypertension. Hypertension can be hereditary or can be caused by certain medical conditions, pain, stress, or \"white coat syndrome. \"       Please make an appointment with your health care provider(s) for follow up of your Emergency Department visit. VITALS:   Patient Vitals for the past 8 hrs:   Temp Pulse Resp BP SpO2   11/16/19 2012 98.2 °F (36.8 °C) 73 16 134/78 100 %          Thank you for allowing us to provide you with medical care today. We realize that you have many choices for your emergency care needs. Please choose us in the future for any continued health care needs. Alphonso Aguiar, [employer]. Office: [phone]            Recent Results (from the past 24 hour(s))   URINALYSIS W/MICROSCOPIC    Collection Time: 11/16/19  8:21 PM   Result Value Ref Range    Color YELLOW/STRAW      Appearance CLEAR CLEAR      Specific gravity 1.010 1.003 - 1.030      pH (UA) 7.5 5.0 - 8.0      Protein NEGATIVE  NEG mg/dL    Glucose NEGATIVE  NEG mg/dL    Ketone NEGATIVE  NEG mg/dL    Bilirubin NEGATIVE  NEG      Blood NEGATIVE  NEG      Urobilinogen 0.2 0.2 - 1.0 EU/dL    Nitrites NEGATIVE  NEG      Leukocyte Esterase TRACE (A) NEG      WBC 5-10 0 - 4 /hpf    RBC 0-5 0 - 5 /hpf    Epithelial cells FEW FEW /lpf    Bacteria 1+ (A) NEG /hpf   URINE CULTURE HOLD SAMPLE    Collection Time: 11/16/19  8:21 PM   Result Value Ref Range    Urine culture hold        URINE ON HOLD IN MICROBIOLOGY DEPT FOR 3 DAYS. IF UNPRESERVED URINE IS SUBMITTED, IT CANNOT BE USED FOR ADDITIONAL TESTING AFTER 24 HRS, RECOLLECTION WILL BE REQUIRED. SAMPLES BEING HELD    Collection Time: 11/16/19  8:21 PM   Result Value Ref Range    SAMPLES BEING HELD 1GOLD 1RED 1BLUE     COMMENT        Add-on orders for these samples will be processed based on acceptable specimen integrity and analyte stability, which may vary by analyte. CBC WITH AUTOMATED DIFF    Collection Time: 11/16/19  8:21 PM   Result Value Ref Range    WBC 5.2 3.6 - 11.0 K/uL    RBC 4.04 3.80 - 5.20 M/uL    HGB 12.1 11.5 - 16.0 g/dL    HCT 37.2 35.0 - 47.0 %    MCV 92.1 80.0 - 99.0 FL    MCH 30.0 26.0 - 34.0 PG    MCHC 32.5 30.0 - 36.5 g/dL    RDW 13.4 11.5 - 14.5 %    PLATELET 566 033 - 802 K/uL    MPV 9.2 8.9 - 12.9 FL    NEUTROPHILS 61 32 - 75 %    LYMPHOCYTES 27 12 - 49 %    MONOCYTES 10 5 - 13 %    EOSINOPHILS 2 0 - 7 %    BASOPHILS 0 0 - 1 %    ABS. NEUTROPHILS 3.2 1.8 - 8.0 K/UL    ABS. LYMPHOCYTES 1.4 0.8 - 3.5 K/UL    ABS. MONOCYTES 0.5 0.0 - 1.0 K/UL    ABS. EOSINOPHILS 0.1 0.0 - 0.4 K/UL    ABS.  BASOPHILS 0.0 0.0 - 0.1 K/UL    DF AUTOMATED     METABOLIC PANEL, BASIC Collection Time: 11/16/19  8:21 PM   Result Value Ref Range    Sodium 133 (L) 136 - 145 mmol/L    Potassium 4.1 3.5 - 5.1 mmol/L    Chloride 100 97 - 108 mmol/L    CO2 25 21 - 32 mmol/L    Anion gap 8 5 - 15 mmol/L    Glucose 107 (H) 65 - 100 mg/dL    BUN 26 (H) 6 - 20 MG/DL    Creatinine 1.18 (H) 0.55 - 1.02 MG/DL    BUN/Creatinine ratio 22 (H) 12 - 20      GFR est AA 54 (L) >60 ml/min/1.73m2    GFR est non-AA 44 (L) >60 ml/min/1.73m2    Calcium 9.4 8.5 - 10.1 MG/DL   HEPATIC FUNCTION PANEL    Collection Time: 11/16/19  8:21 PM   Result Value Ref Range    Protein, total 7.5 6.4 - 8.2 g/dL    Albumin 3.9 3.5 - 5.0 g/dL    Globulin 3.6 2.0 - 4.0 g/dL    A-G Ratio 1.1 1.1 - 2.2      Bilirubin, total 0.3 0.2 - 1.0 MG/DL    Bilirubin, direct 0.1 0.0 - 0.2 MG/DL    Alk. phosphatase 67 45 - 117 U/L    AST (SGOT) 27 15 - 37 U/L    ALT (SGPT) 59 12 - 78 U/L   LIPASE    Collection Time: 11/16/19  8:21 PM   Result Value Ref Range    Lipase 107 73 - 393 U/L       Xr Abd Acute W 1 V Chest    Result Date: 11/16/2019  EXAM:  XR ABD ACUTE W 1 V CHEST INDICATION: Right lower quadrant abdominal pain, constipation today. Left breast carcinoma. Chronic constipation. COMPARISON: CT abdomen/pelvis on 11/14/2019. TECHNIQUE: Erect chest and abdomen and supine abdomen radiographs (acute abdominal series). FINDINGS: The cardiomediastinal and hilar contours are within normal limits. The lungs and pleural spaces are clear. No free air under the diaphragm. There is scattered air within the colon and small bowel. No bowel dilatation to suggest obstruction. No evidence of free intraperitoneal air. Decreased colonic fecal burden since 2 days ago. Surgical clips indicate cholecystectomy and left axillary lymph node dissection. No renal calcification. Left Curvature of the upper lumbar spine is unchanged. IMPRESSION: Nonobstructive bowel gas pattern without evidence of free air.  Decreased colonic fecal burden since 2 days ago, now within normal limits. Patient Education        Abdominal Pain: Care Instructions  Your Care Instructions    Abdominal pain has many possible causes. Some aren't serious and get better on their own in a few days. Others need more testing and treatment. If your pain continues or gets worse, you need to be rechecked and may need more tests to find out what is wrong. You may need surgery to correct the problem. Don't ignore new symptoms, such as fever, nausea and vomiting, urination problems, pain that gets worse, and dizziness. These may be signs of a more serious problem. Your doctor may have recommended a follow-up visit in the next 8 to 12 hours. If you are not getting better, you may need more tests or treatment. The doctor has checked you carefully, but problems can develop later. If you notice any problems or new symptoms, get medical treatment right away. Follow-up care is a key part of your treatment and safety. Be sure to make and go to all appointments, and call your doctor if you are having problems. It's also a good idea to know your test results and keep a list of the medicines you take. How can you care for yourself at home? · Rest until you feel better. · To prevent dehydration, drink plenty of fluids, enough so that your urine is light yellow or clear like water. Choose water and other caffeine-free clear liquids until you feel better. If you have kidney, heart, or liver disease and have to limit fluids, talk with your doctor before you increase the amount of fluids you drink. · If your stomach is upset, eat mild foods, such as rice, dry toast or crackers, bananas, and applesauce. Try eating several small meals instead of two or three large ones. · Wait until 48 hours after all symptoms have gone away before you have spicy foods, alcohol, and drinks that contain caffeine. · Do not eat foods that are high in fat.   · Avoid anti-inflammatory medicines such as aspirin, ibuprofen (Advil, Motrin), and naproxen (Aleve). These can cause stomach upset. Talk to your doctor if you take daily aspirin for another health problem. When should you call for help? Call 911 anytime you think you may need emergency care. For example, call if:    · You passed out (lost consciousness).     · You pass maroon or very bloody stools.     · You vomit blood or what looks like coffee grounds.     · You have new, severe belly pain.    Call your doctor now or seek immediate medical care if:    · Your pain gets worse, especially if it becomes focused in one area of your belly.     · You have a new or higher fever.     · Your stools are black and look like tar, or they have streaks of blood.     · You have unexpected vaginal bleeding.     · You have symptoms of a urinary tract infection. These may include:  ? Pain when you urinate. ? Urinating more often than usual.  ? Blood in your urine.     · You are dizzy or lightheaded, or you feel like you may faint.    Watch closely for changes in your health, and be sure to contact your doctor if:    · You are not getting better after 1 day (24 hours). Where can you learn more? Go to http://jorge-claritza.info/. Enter X090 in the search box to learn more about \"Abdominal Pain: Care Instructions. \"  Current as of: June 26, 2019  Content Version: 12.2  © 9489-0475 [employer]. Care instructions adapted under license by [employer] (which disclaims liability or warranty for this information). If you have questions about a medical condition or this instruction, always ask your healthcare professional. [address] [address] any warranty or liability for your use of this information. Patient Education        Constipation: Care Instructions  Your Care Instructions    Constipation means that you have a hard time passing stools (bowel movements). People pass stools from 3 times a day to once every 3 days.  What is normal for you may be different. Constipation may occur with pain in the rectum and cramping. The pain may get worse when you try to pass stools. Sometimes there are small amounts of bright red blood on toilet paper or the surface of stools. This is because of enlarged veins near the rectum (hemorrhoids). A few changes in your diet and lifestyle may help you avoid ongoing constipation. Your doctor may also prescribe medicine to help loosen your stool. Some medicines can cause constipation. These include pain medicines and antidepressants. Tell your doctor about all the medicines you take. Your doctor may want to make a medicine change to ease your symptoms. Follow-up care is a key part of your treatment and safety. Be sure to make and go to all appointments, and call your doctor if you are having problems. It's also a good idea to know your test results and keep a list of the medicines you take. How can you care for yourself at home? · Drink plenty of fluids, enough so that your urine is light yellow or clear like water. If you have kidney, heart, or liver disease and have to limit fluids, talk with your doctor before you increase the amount of fluids you drink. · Include high-fiber foods in your diet each day. These include fruits, vegetables, beans, and whole grains. · Get at least 30 minutes of exercise on most days of the week. Walking is a good choice. You also may want to do other activities, such as running, swimming, cycling, or playing tennis or team sports. · Take a fiber supplement, such as Citrucel or Metamucil, every day. Read and follow all instructions on the label. · Schedule time each day for a bowel movement. A daily routine may help. Take your time having your bowel movement. · Support your feet with a small step stool when you sit on the toilet. This helps flex your hips and places your pelvis in a squatting position. · Your doctor may recommend an over-the-counter laxative to relieve your constipation. Examples are Milk of Magnesia and MiraLax. Read and follow all instructions on the label. Do not use laxatives on a long-term basis. When should you call for help? Call your doctor now or seek immediate medical care if:    · You have new or worse belly pain.     · You have new or worse nausea or vomiting.     · You have blood in your stools.    Watch closely for changes in your health, and be sure to contact your doctor if:    · Your constipation is getting worse.     · You do not get better as expected. Where can you learn more? Go to http://jorge-claritza.info/. Enter 21  in the search box to learn more about \"Constipation: Care Instructions. \"  Current as of: June 26, 2019  Content Version: 12.2  © 8867-1626 [employer]. Care instructions adapted under license by [employer] (which disclaims liability or warranty for this information). If you have questions about a medical condition or this instruction, always ask your healthcare professional. [address] [address] any warranty or liability for your use of this information. Patient Education        Urinary Tract Infection in Women: Care Instructions  Your Care Instructions    A urinary tract infection, or UTI, is a general term for an infection anywhere between the kidneys and the urethra (where urine comes out). Most UTIs are bladder infections. They often cause pain or burning when you urinate. UTIs are caused by bacteria and can be cured with antibiotics. Be sure to complete your treatment so that the infection goes away. Follow-up care is a key part of your treatment and safety. Be sure to make and go to all appointments, and call your doctor if you are having problems. It's also a good idea to know your test results and keep a list of the medicines you take. How can you care for yourself at home? · Take your antibiotics as directed. Do not stop taking them just because you feel better. You need to take the full course of antibiotics. · Drink extra water and other fluids for the next day or two. This may help wash out the bacteria that are causing the infection. (If you have kidney, heart, or liver disease and have to limit fluids, talk with your doctor before you increase your fluid intake.)  · Avoid drinks that are carbonated or have caffeine. They can irritate the bladder. · Urinate often. Try to empty your bladder each time. · To relieve pain, take a hot bath or lay a heating pad set on low over your lower belly or genital area. Never go to sleep with a heating pad in place. To prevent UTIs  · Drink plenty of water each day. This helps you urinate often, which clears bacteria from your system. (If you have kidney, heart, or liver disease and have to limit fluids, talk with your doctor before you increase your fluid intake.)  · Urinate when you need to. · Urinate right after you have sex. · Change sanitary pads often. · Avoid douches, bubble baths, feminine hygiene sprays, and other feminine hygiene products that have deodorants. · After going to the bathroom, wipe from front to back. When should you call for help? Call your doctor now or seek immediate medical care if:    · Symptoms such as fever, chills, nausea, or vomiting get worse or appear for the first time.     · You have new pain in your back just below your rib cage. This is called flank pain.     · There is new blood or pus in your urine.     · You have any problems with your antibiotic medicine.    Watch closely for changes in your health, and be sure to contact your doctor if:    · You are not getting better after taking an antibiotic for 2 days.     · Your symptoms go away but then come back. Where can you learn more? Go to http://jorge-claritza.info/. Enter C747 in the search box to learn more about \"Urinary Tract Infection in Women: Care Instructions. \"  Current as of: December 19, 2018  Content Version: 12.2  © 9379-8927 [employer]. Care instructions adapted under license by [email or web address] (which disclaims liability or warranty for this information). If you have questions about a medical condition or this instruction, always ask your healthcare professional. [address] any warranty or liability for your use of this information.

## 2019-11-17 NOTE — ED TRIAGE NOTES
Pt ambulatory to room. Was here a couple days ago for same symptoms. Lower right abdominal pain.   Hx of constipation

## 2019-11-17 NOTE — ED PROVIDER NOTES
68-year-old female comes emergency room with chief complaint of abdominal pain. Patient states that she was seen 2 days ago for similar symptoms. Patient states that she has a history of chronic constipation. Patient states that she saw her PCP the next day. Patient states that her pain is continued to worsen over the past day. Patient states that she took Mesfin Mini yesterday with a bowel movement. Patient has not had a bowel movement today. Patient denies any fever or chills. Patient denies any urinary symptoms. Patient states the pain is in her right back and radiates to her right groin/lower quadrant. Patient states that the pain is sharp in the back and then dull in the front. Patient denies any nausea or vomiting. Abdominal Pain    This is a chronic problem. The current episode started more than 2 days ago. The problem occurs daily. The problem has been gradually worsening. The pain is located in the RLQ. The quality of the pain is sharp and aching. The pain is at a severity of 8/10. Associated symptoms include constipation. Pertinent negatives include no fever, no diarrhea, no nausea, no vomiting, no dysuria, no frequency, no hematuria, no myalgias, no chest pain and no back pain. The pain is worsened by palpation. The pain is relieved by nothing. Past workup includes surgery. Her past medical history is significant for irritable bowel syndrome and cancer (Breast). The patient's surgical history includes cholecystectomy and hysterectomy.        Past Medical History:   Diagnosis Date    Arthritis     Breast cancer ([facility] [address] 2015     left triple neg    Constipation     Irritable bowel syndrome (IBS)     Lymphedema     left arm    Mitral prolapse     Neurological disorder     chemotherapy induced neuropathy    Radiation therapy complication 5075    left breast ca       Past Surgical History:   Procedure Laterality Date    COLONOSCOPY N/A 5/31/2018    COLONOSCOPY performed by Nicole White MD at [address]   5/31/2018         HX BREAST BIOPSY Left yrs    neg; stereotactic    HX BREAST LUMPECTOMY Left 2015    HX BREAST REDUCTION Bilateral yrs ago    HX CHOLECYSTECTOMY      HX COLONOSCOPY      HX GYN      Hysterectomy    HX ORTHOPAEDIC Right     surgery on the ankle         Family History:   Problem Relation Age of Onset    Breast Cancer Maternal Grandmother     Heart Disease Mother     Heart Disease Father        Social History     Socioeconomic History    Marital status:      Spouse name: Not on file    Number of children: Not on file    Years of education: Not on file    Highest education level: Not on file   Occupational History    Not on file   Social Needs    Financial resource strain: Not on file    Food insecurity:     Worry: Not on file     Inability: Not on file    Transportation needs:     Medical: Not on file     Non-medical: Not on file   Tobacco Use    Smoking status: Former Smoker    Smokeless tobacco: Never Used   Substance and Sexual Activity    Alcohol use: No    Drug use: No    Sexual activity: Yes     Partners: Male   Lifestyle    Physical activity:     Days per week: Not on file     Minutes per session: Not on file    Stress: Not on file   Relationships    Social connections:     Talks on phone: Not on file     Gets together: Not on file     Attends Judaism service: Not on file     Active member of club or organization: Not on file     Attends meetings of clubs or organizations: Not on file     Relationship status: Not on file    Intimate partner violence:     Fear of current or ex partner: Not on file     Emotionally abused: Not on file     Physically abused: Not on file     Forced sexual activity: Not on file   Other Topics Concern    Not on file   Social History Narrative    Not on file     ALLERGIES: Patient has no known allergies.     Review of Systems   Constitutional: Negative for appetite change, chills, fever and unexpected weight change. HENT: Negative for ear pain, hearing loss, rhinorrhea and trouble swallowing. Eyes: Negative for pain and visual disturbance. Respiratory: Negative for cough, chest tightness and shortness of breath. Cardiovascular: Negative for chest pain and palpitations. Gastrointestinal: Positive for abdominal pain and constipation. Negative for abdominal distention, blood in stool, diarrhea, nausea and vomiting. Genitourinary: Negative for dysuria, frequency, hematuria and urgency. Musculoskeletal: Negative for back pain and myalgias. Skin: Negative for rash. Neurological: Negative for dizziness, syncope, weakness and numbness. Psychiatric/Behavioral: Negative for confusion and suicidal ideas. All other systems reviewed and are negative. Vitals:    11/16/19 2012   BP: 134/78   Pulse: 73   Resp: 16   Temp: 98.2 °F (36.8 °C)   SpO2: 100%   Weight: 62.1 kg (137 lb)   Height: 5' 1\" (1.549 m)            Physical Exam   Constitutional: She is oriented to person, place, and time. She appears well-developed and well-nourished. No distress. HENT:   Head: Normocephalic and atraumatic. Right Ear: External ear normal.   Left Ear: External ear normal.   Nose: Nose normal.   Mouth/Throat: Oropharynx is clear and moist. No oropharyngeal exudate. Eyes: Pupils are equal, round, and reactive to light. Conjunctivae and EOM are normal. Right eye exhibits no discharge. Left eye exhibits no discharge. No scleral icterus. Neck: Normal range of motion. Neck supple. No JVD present. No tracheal deviation present. Cardiovascular: Normal rate, regular rhythm, normal heart sounds and intact distal pulses. Exam reveals no gallop and no friction rub. No murmur heard. Pulmonary/Chest: Effort normal and breath sounds normal. No stridor. No respiratory distress. She has no decreased breath sounds. She has no wheezes. She has no rhonchi. She has no rales. She exhibits no tenderness. Abdominal: Soft. Bowel sounds are normal. She exhibits no distension. There is tenderness in the right lower quadrant and suprapubic area. There is no rebound and no guarding. Musculoskeletal: Normal range of motion. She exhibits no edema or tenderness. Neurological: She is alert and oriented to person, place, and time. She has normal strength and normal reflexes. She displays normal reflexes. No cranial nerve deficit or sensory deficit. She exhibits normal muscle tone. Coordination normal. GCS eye subscore is 4. GCS verbal subscore is 5. GCS motor subscore is 6. Skin: Skin is warm and dry. Capillary refill takes less than 2 seconds. No rash noted. She is not diaphoretic. No erythema. No pallor. Psychiatric: She has a normal mood and affect. Her behavior is normal. Judgment and thought content normal.   Nursing note and vitals reviewed. MDM  Number of Diagnoses or Management Options  Abdominal pain, generalized:   Acute cystitis without hematuria:   Constipation, unspecified constipation type:      Amount and/or Complexity of Data Reviewed  Clinical lab tests: ordered and reviewed  Tests in the radiology section of CPT®: ordered and reviewed  Review and summarize past medical records: yes    Risk of Complications, Morbidity, and/or Mortality  Presenting problems: moderate  Diagnostic procedures: moderate  Management options: moderate    Patient Progress  Patient progress: stable         Procedures    Chief Complaint   Patient presents with    Abdominal Pain       The patient's presenting problems have been discussed, and they are in agreement with the care plan formulated and outlined with them. I have encouraged them to ask questions as they arise throughout their visit.     MEDICATIONS GIVEN:  Medications   sodium chloride (NS) flush 5-40 mL (has no administration in time range)   sodium chloride (NS) flush 5-40 mL (has no administration in time range)   ketorolac (TORADOL) injection 15 mg (15 mg IntraVENous Given 11/16/19 2043)   sodium chloride 0.9 % bolus infusion 1,000 mL (1,000 mL IntraVENous New Bag 11/16/19 2043)   cephALEXin (KEFLEX) capsule 500 mg (500 mg Oral Given 11/16/19 2203)       LABS REVIEWED:  Recent Results (from the past 24 hour(s))   URINALYSIS W/MICROSCOPIC    Collection Time: 11/16/19  8:21 PM   Result Value Ref Range    Color YELLOW/STRAW      Appearance CLEAR CLEAR      Specific gravity 1.010 1.003 - 1.030      pH (UA) 7.5 5.0 - 8.0      Protein NEGATIVE  NEG mg/dL    Glucose NEGATIVE  NEG mg/dL    Ketone NEGATIVE  NEG mg/dL    Bilirubin NEGATIVE  NEG      Blood NEGATIVE  NEG      Urobilinogen 0.2 0.2 - 1.0 EU/dL    Nitrites NEGATIVE  NEG      Leukocyte Esterase TRACE (A) NEG      WBC 5-10 0 - 4 /hpf    RBC 0-5 0 - 5 /hpf    Epithelial cells FEW FEW /lpf    Bacteria 1+ (A) NEG /hpf   URINE CULTURE HOLD SAMPLE    Collection Time: 11/16/19  8:21 PM   Result Value Ref Range    Urine culture hold        URINE ON HOLD IN MICROBIOLOGY DEPT FOR 3 DAYS. IF UNPRESERVED URINE IS SUBMITTED, IT CANNOT BE USED FOR ADDITIONAL TESTING AFTER 24 HRS, RECOLLECTION WILL BE REQUIRED. SAMPLES BEING HELD    Collection Time: 11/16/19  8:21 PM   Result Value Ref Range    SAMPLES BEING HELD 1GOLD 1RED 1BLUE     COMMENT        Add-on orders for these samples will be processed based on acceptable specimen integrity and analyte stability, which may vary by analyte. CBC WITH AUTOMATED DIFF    Collection Time: 11/16/19  8:21 PM   Result Value Ref Range    WBC 5.2 3.6 - 11.0 K/uL    RBC 4.04 3.80 - 5.20 M/uL    HGB 12.1 11.5 - 16.0 g/dL    HCT 37.2 35.0 - 47.0 %    MCV 92.1 80.0 - 99.0 FL    MCH 30.0 26.0 - 34.0 PG    MCHC 32.5 30.0 - 36.5 g/dL    RDW 13.4 11.5 - 14.5 %    PLATELET 049 532 - 372 K/uL    MPV 9.2 8.9 - 12.9 FL    NEUTROPHILS 61 32 - 75 %    LYMPHOCYTES 27 12 - 49 %    MONOCYTES 10 5 - 13 %    EOSINOPHILS 2 0 - 7 %    BASOPHILS 0 0 - 1 %    ABS. NEUTROPHILS 3.2 1.8 - 8.0 K/UL    ABS.  LYMPHOCYTES 1.4 0.8 - 3.5 K/UL    ABS. MONOCYTES 0.5 0.0 - 1.0 K/UL    ABS. EOSINOPHILS 0.1 0.0 - 0.4 K/UL    ABS. BASOPHILS 0.0 0.0 - 0.1 K/UL    DF AUTOMATED     METABOLIC PANEL, BASIC    Collection Time: 11/16/19  8:21 PM   Result Value Ref Range    Sodium 133 (L) 136 - 145 mmol/L    Potassium 4.1 3.5 - 5.1 mmol/L    Chloride 100 97 - 108 mmol/L    CO2 25 21 - 32 mmol/L    Anion gap 8 5 - 15 mmol/L    Glucose 107 (H) 65 - 100 mg/dL    BUN 26 (H) 6 - 20 MG/DL    Creatinine 1.18 (H) 0.55 - 1.02 MG/DL    BUN/Creatinine ratio 22 (H) 12 - 20      GFR est AA 54 (L) >60 ml/min/1.73m2    GFR est non-AA 44 (L) >60 ml/min/1.73m2    Calcium 9.4 8.5 - 10.1 MG/DL   HEPATIC FUNCTION PANEL    Collection Time: 11/16/19  8:21 PM   Result Value Ref Range    Protein, total 7.5 6.4 - 8.2 g/dL    Albumin 3.9 3.5 - 5.0 g/dL    Globulin 3.6 2.0 - 4.0 g/dL    A-G Ratio 1.1 1.1 - 2.2      Bilirubin, total 0.3 0.2 - 1.0 MG/DL    Bilirubin, direct 0.1 0.0 - 0.2 MG/DL    Alk. phosphatase 67 45 - 117 U/L    AST (SGOT) 27 15 - 37 U/L    ALT (SGPT) 59 12 - 78 U/L   LIPASE    Collection Time: 11/16/19  8:21 PM   Result Value Ref Range    Lipase 107 73 - 393 U/L       VITAL SIGNS:  Patient Vitals for the past 24 hrs:   Temp Pulse Resp BP SpO2   11/16/19 2012 98.2 °F (36.8 °C) 73 16 134/78 100 %       RADIOLOGY RESULTS:  The following have been ordered and reviewed:  Xr Abd Acute W 1 V Chest    Result Date: 11/16/2019  EXAM:  XR ABD ACUTE W 1 V CHEST INDICATION: Right lower quadrant abdominal pain, constipation today. Left breast carcinoma. Chronic constipation. COMPARISON: CT abdomen/pelvis on 11/14/2019. TECHNIQUE: Erect chest and abdomen and supine abdomen radiographs (acute abdominal series). FINDINGS: The cardiomediastinal and hilar contours are within normal limits. The lungs and pleural spaces are clear. No free air under the diaphragm. There is scattered air within the colon and small bowel. No bowel dilatation to suggest obstruction.  No evidence of free intraperitoneal air. Decreased colonic fecal burden since 2 days ago. Surgical clips indicate cholecystectomy and left axillary lymph node dissection. No renal calcification. Left Curvature of the upper lumbar spine is unchanged. IMPRESSION: Nonobstructive bowel gas pattern without evidence of free air. Decreased colonic fecal burden since 2 days ago, now within normal limits. PROGRESS NOTES:  Discussed results and plan with patient and spouse. Patient will be discharged home with PCP and GI follow up. Patient instructed to return to the emergency room for any worsening symptoms or any other concerns. DIAGNOSIS:    1. Abdominal pain, generalized    2. Acute cystitis without hematuria    3. Constipation, unspecified constipation type        PLAN:  Follow-up Information     Follow up With Specialties Details Why Contact Info    Zonia Braga MD Internal Medicine Schedule an appointment as soon as possible for a visit  [phone]      Clare Weston MD Gastroenterology Schedule an appointment as soon as possible for a visit  [phone]  [phone]      [facility] DEPT Emergency Medicine  If symptoms worsen [address] [facility] [address] [id number] [phone]        Current Discharge Medication List      START taking these medications    Details   cephALEXin (KEFLEX) 500 mg capsule Take 1 Cap by mouth three (3) times daily for 7 days. Qty: 21 Cap, Refills: 0         CONTINUE these medications which have NOT CHANGED    Details   traZODone (DESYREL) 50 mg tablet Take 50 mg by mouth nightly.      gabapentin (NEURONTIN) 300 mg capsule 2 in am , 2 in pm, 4 qhs  Qty: 240 Cap, Refills: 2    Associated Diagnoses: Neuropathy      linaCLOtide (LINZESS) 290 mcg cap capsule Take 1 Cap by mouth daily for 115 days.   Qty: 90 Cap, Refills: 1    Associated Diagnoses: Chronic constipation DULoxetine (CYMBALTA) 60 mg capsule Take 1 Cap by mouth daily. Qty: 90 Cap, Refills: 1    Associated Diagnoses: Neuropathy      omeprazole (PRILOSEC) 20 mg capsule TAKE ONE CAPSULE BY MOUTH DAILY  Qty: 90 Cap, Refills: 1    Associated Diagnoses: Pain of right hip joint      celecoxib (CELEBREX) 200 mg capsule TAKE 1 CAPSULE BY MOUTH TWICE DAILY  Qty: 180 Cap, Refills: 1    Associated Diagnoses: Pain of right hip joint      tiZANidine (ZANAFLEX) 2 mg tablet Take 1 Tab by mouth three (3) times daily as needed for Pain for up to 7 days. Qty: 10 Tab, Refills: 0    Associated Diagnoses: Right sided abdominal pain             ED COURSE: The patient's hospital course has been uncomplicated.

## 2019-11-17 NOTE — ED NOTES
The patient was discharged home  in stable condition. The patient is alert and oriented, in no respiratory distress and discharge vital signs obtained. The patient's diagnosis, condition and treatment were explained. The patient expressed understanding. prescriptions given. No work/school note given. A discharge plan has been developed. A  was not involved in the process. Aftercare instructions were given. Pt ambulatory out of the ED. Pt discharged from the ED with family.

## 2019-11-18 LAB
BACTERIA SPEC CULT: NORMAL
CC UR VC: NORMAL
SERVICE CMNT-IMP: NORMAL

## 2019-12-02 ENCOUNTER — TELEPHONE (OUTPATIENT)
Dept: INTERNAL MEDICINE CLINIC | Age: 79
End: 2019-12-02

## 2019-12-02 DIAGNOSIS — K59.09 CHRONIC CONSTIPATION: ICD-10-CM

## 2019-12-02 NOTE — TELEPHONE ENCOUNTER
----- Message from Billie Coronado MA sent at 4/22/2019  4:09 PM CDT -----      ----- Message -----  From: Nella He MA  Sent: 4/18/2019   4:31 PM  To: [facility] Staff    Dr. Huston would like this patient to follow up with Dr. Lucero as soon as possible or at the soonest available  Thanks   Nella  ----- Message -----  From: Edilia Zepeda  Sent: 4/18/2019   4:14 PM  To: Nella He MA    I cannot override for Dr. Lucero, but; you can send this to [facility].     Per PCP verbal approval refill for the patient's linzess was sent to patient's pharmacy on file.

## 2019-12-21 DIAGNOSIS — G62.9 NEUROPATHY: ICD-10-CM

## 2019-12-22 RX ORDER — GABAPENTIN 300 MG/1
CAPSULE ORAL
Qty: 240 CAP | Refills: 0 | Status: SHIPPED | OUTPATIENT
Start: 2019-12-22 | End: 2020-04-24 | Stop reason: SDUPTHER

## 2020-01-07 RX ORDER — MELOXICAM 15 MG/1
TABLET ORAL
Qty: 90 TAB | Refills: 0 | Status: SHIPPED | OUTPATIENT
Start: 2020-01-07 | End: 2020-04-10 | Stop reason: ALTCHOICE

## 2020-02-21 ENCOUNTER — TELEPHONE (OUTPATIENT)
Dept: INTERNAL MEDICINE CLINIC | Age: 80
End: 2020-02-21

## 2020-02-21 DIAGNOSIS — M25.551 PAIN OF RIGHT HIP JOINT: ICD-10-CM

## 2020-02-21 RX ORDER — CELECOXIB 200 MG/1
CAPSULE ORAL
Qty: 180 CAP | Refills: 0 | Status: SHIPPED | OUTPATIENT
Start: 2020-02-21 | End: 2020-03-16

## 2020-02-21 RX ORDER — OMEPRAZOLE 20 MG/1
CAPSULE, DELAYED RELEASE ORAL
Qty: 90 CAP | Refills: 0 | Status: SHIPPED | OUTPATIENT
Start: 2020-02-21 | End: 2020-03-16

## 2020-02-21 NOTE — TELEPHONE ENCOUNTER
Requested refills sent. Please notify she will be due for an appt in March or April for a med check. Please assist with scheduling as last visit in Nov was for an acute issue not meds.

## 2020-02-21 NOTE — TELEPHONE ENCOUNTER
Dr. Carlos Stokes   Received:  Today   Message Contents   Eliza Pang sent to [employer]  Phone Number: [phone]         Caller (if not patient): Tylor Marion   Relationship of caller (if not patient):    Best contact number(s): [phone]   Name of medication and dosage if known: \"Omeprazole\" 20mg, \"Celecoxib\" 200mg   Is patient out of this medication (yes/no): No, 2 days left   Pharmacy name: [facility] listed in chart? (yes/no): Yes   Pharmacy phone number: N/A   Date of last visit: November 15, 2019   Details to clarify the request: N/A

## 2020-03-06 ENCOUNTER — OFFICE VISIT (OUTPATIENT)
Dept: INTERNAL MEDICINE CLINIC | Age: 80
End: 2020-03-06

## 2020-03-06 VITALS
HEART RATE: 64 BPM | HEIGHT: 61 IN | BODY MASS INDEX: 25.68 KG/M2 | WEIGHT: 136 LBS | SYSTOLIC BLOOD PRESSURE: 133 MMHG | DIASTOLIC BLOOD PRESSURE: 80 MMHG | RESPIRATION RATE: 16 BRPM | TEMPERATURE: 97.6 F | OXYGEN SATURATION: 98 %
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
## 2020-03-06 DIAGNOSIS — F51.02 ADJUSTMENT INSOMNIA: ICD-10-CM

## 2020-03-06 DIAGNOSIS — S00.03XA SCALP HEMATOMA, INITIAL ENCOUNTER: ICD-10-CM

## 2020-03-06 DIAGNOSIS — K59.09 CHRONIC CONSTIPATION: ICD-10-CM

## 2020-03-06 RX ORDER — TRAZODONE HYDROCHLORIDE 100 MG/1
100 TABLET ORAL
Qty: 30 TAB | Refills: 4 | Status: SHIPPED | OUTPATIENT
Start: 2020-03-06 | End: 2020-04-24 | Stop reason: ALTCHOICE

## 2020-03-06 NOTE — PROGRESS NOTES
HISTORY OF PRESENT ILLNESS  Ana Luisa Spain is a 70 y.o. female. HPI  Follow up, several issues:  1. Two days ago she got up at bedtime to go to the bathroom and fell, hitting the back of her head. She thinks her knees gave out. She does not remember all the details because she admits to being groggy. She is taking four Gabapentin at night and I am concerned that this does significantly increase her risk of falls. She has a small hematoma, but she is not having any significant headaches, dizzy spells, did not have nausea, vomiting or changes in vision. 2. Insomnia, despite 1,200 mg of Gabapentin at bedtime and Trazodone 50, she notes she is not sleeping well. She wants Sonata. We discussed again that this is really contraindicated given her risk of falls and other sedating medications. Discussed options. Will try increasing Trazodone. She declines Mirtazapine because of concern about side effects. 3. Low back pain. Went to the ER in November, feels her pain got better with five days of antibiotics. We reviewed that her urine culture was no growth. Review of Systems   Constitutional: Positive for malaise/fatigue. Negative for chills, fever and weight loss. Eyes: Negative for blurred vision and double vision. Respiratory: Negative for cough, shortness of breath and wheezing. Cardiovascular: Negative for chest pain, palpitations, orthopnea, leg swelling and PND. Gastrointestinal: Negative for heartburn, nausea and vomiting. Musculoskeletal: Positive for falls. Negative for myalgias. Neurological: Negative for dizziness, sensory change, speech change, focal weakness and headaches. Psychiatric/Behavioral: The patient has insomnia. Physical Exam  Vitals signs and nursing note reviewed. Constitutional:       Appearance: She is well-developed. HENT:      Head: Normocephalic and atraumatic.       Comments: Small hematoma right parietal scalp  Neck:      Musculoskeletal: Normal range of motion and neck supple. Thyroid: No thyromegaly. Vascular: No carotid bruit. Cardiovascular:      Rate and Rhythm: Normal rate and regular rhythm. Heart sounds: Normal heart sounds, S1 normal and S2 normal. No murmur. Pulmonary:      Effort: Pulmonary effort is normal. No respiratory distress. Breath sounds: Normal breath sounds. No wheezing or rales. Neurological:      Mental Status: She is alert and oriented to person, place, and time. Cranial Nerves: Cranial nerves are intact. Sensory: Sensation is intact. Motor: Motor function is intact. Coordination: Coordination is intact. Psychiatric:         Behavior: Behavior normal.         ASSESSMENT and PLAN  Diagnoses and all orders for this visit:    1. Fall, initial encounter-I am very worried  Re her ongoing risk of falls with the high dose gabapentin and do not feel comfortable adding any sedative hypnotics  Discussed sleep hygiene and  Inc trazodone dose  Encouraged her to dec total dose of gabapentin    2. Adjustment insomnia  -     traZODone (DESYREL) 100 mg tablet; Take 1 Tab by mouth nightly. 3. Chronic constipation    4.  Scalp hematoma, initial encounter  Discussed warning signs of subdural to go to er if develops any  Low  Concern for  This  appt in 1mo

## 2020-03-06 NOTE — PATIENT INSTRUCTIONS

## 2020-03-16 DIAGNOSIS — M25.551 PAIN OF RIGHT HIP JOINT: ICD-10-CM

## 2020-03-16 RX ORDER — MELOXICAM 15 MG/1
TABLET ORAL
Qty: 90 TAB | Refills: 0 | OUTPATIENT
Start: 2020-03-16

## 2020-03-16 RX ORDER — CELECOXIB 200 MG/1
CAPSULE ORAL
Qty: 180 CAP | Refills: 0 | Status: SHIPPED | OUTPATIENT
Start: 2020-03-16 | End: 2020-04-10 | Stop reason: SDUPTHER

## 2020-03-16 RX ORDER — OMEPRAZOLE 20 MG/1
CAPSULE, DELAYED RELEASE ORAL
Qty: 90 CAP | Refills: 0 | Status: SHIPPED | OUTPATIENT
Start: 2020-03-16 | End: 2020-07-28

## 2020-03-16 NOTE — TELEPHONE ENCOUNTER
Left a message that pharmacy requested refill on both celebrex and mobic and she should not be using both  meds so advised only celebrex and did not refill mobic

## 2020-04-10 ENCOUNTER — VIRTUAL VISIT (OUTPATIENT)
Dept: INTERNAL MEDICINE CLINIC | Age: 80
End: 2020-04-10

## 2020-04-10 VITALS — DIASTOLIC BLOOD PRESSURE: 98 MMHG | TEMPERATURE: 98 F | HEART RATE: 67 BPM | SYSTOLIC BLOOD PRESSURE: 142 MMHG

## 2020-04-10 DIAGNOSIS — M19.019 SHOULDER ARTHRITIS: ICD-10-CM

## 2020-04-10 DIAGNOSIS — G62.9 NEUROPATHY: ICD-10-CM

## 2020-04-10 DIAGNOSIS — R03.0 ELEVATED BP WITHOUT DIAGNOSIS OF HYPERTENSION: ICD-10-CM

## 2020-04-10 DIAGNOSIS — M25.551 PAIN OF RIGHT HIP JOINT: ICD-10-CM

## 2020-04-10 DIAGNOSIS — J06.9 URI, ACUTE: Primary | ICD-10-CM

## 2020-04-10 RX ORDER — CELECOXIB 200 MG/1
CAPSULE ORAL
Qty: 30 CAP | Refills: 0
Start: 2020-04-10 | End: 2020-06-16 | Stop reason: ALTCHOICE

## 2020-04-10 RX ORDER — DULOXETIN HYDROCHLORIDE 60 MG/1
60 CAPSULE, DELAYED RELEASE ORAL DAILY
Qty: 90 CAP | Refills: 1 | Status: SHIPPED | OUTPATIENT
Start: 2020-04-10 | End: 2020-08-20 | Stop reason: SDUPTHER

## 2020-04-10 NOTE — PROGRESS NOTES
Consent: Olive Parsons, who was seen by synchronous (real-time) audio-video technology, and/or her healthcare decision maker, is aware that this patient-initiated, Telehealth encounter on 4/10/2020 is a billable service, with coverage as determined by her insurance carrier. She is aware that she may receive a bill and has provided verbal consent to proceed: YES  712  Subjective:   Olive Parsons is a 68 y.o. female who was seen for Follow-up (pt says she is getting over possibly the flu for the past 3 weeks, today pt states no fever and just a stuffy nose ) and Other (pt would like to know which arthritis medication would be best to take between Celebrex and Meloxicam because she was told not to take them at the same time )      1. She had 3 weeks of uri with fatigue and congestion-now doing better never had fever- sick at same time  2. Arthritis-using celebrex alt with mobic-bp is up-I asked her to stop mobic and only use celebrex 200 mg  Prn severe pain and use tylenol as first choice for pain  3. Inc bp-no ha or cp-advised minimize nsaid and watch bp and appt in 2 weeks with bp report  4. Neuropathy-cont on neurontin and cymbalta and when tries to dec dose of neurontin has trouble-requests sonata but I advised against adding this to the neurontin    Current Outpatient Medications   Medication Sig    celecoxib (CELEBREX) 200 mg capsule TAKE ONE CAPSULE every day prn    DULoxetine (CYMBALTA) 60 mg capsule Take 1 Cap by mouth daily.  omeprazole (PRILOSEC) 20 mg capsule TAKE ONE CAPSULE BY MOUTH DAILY    traZODone (DESYREL) 100 mg tablet Take 1 Tab by mouth nightly.  gabapentin (NEURONTIN) 300 mg capsule TAKE TWO CAPSULES BY MOUTH EVERY MORNING TAKE TWO CAPSULES BY MOUTH EVERY EVENING AND TAKE FOUR CAPSULES BY MOUTH EVERY NIGHT AT BEDTIME     No current facility-administered medications for this visit.         No Known Allergies    Past Medical History:   Diagnosis Date    Arthritis     Breast cancer ([facility] [address] 2015     left triple neg    Constipation     Irritable bowel syndrome (IBS)     Lymphedema     left arm    Mitral prolapse     Neurological disorder     chemotherapy induced neuropathy    Radiation therapy complication 9962    left breast ca       ROS  All other systems reviewed and negative, unless mentioned in HPI    Objective:   Vital Signs: (As obtained by patient/caregiver at home)  Visit Vitals  BP (!) 142/98 (BP 1 Location: Left arm, BP Patient Position: Sitting)   Pulse 67   Temp 98 °F (36.7 °C) (Oral)        [INSTRUCTIONS:  \"[x]\" Indicates a positive item  \"[]\" Indicates a negative item  -- DELETE ALL ITEMS NOT EXAMINED]    Constitutional: [x] Appears well-developed and well-nourished [x] No apparent distress      [] Abnormal -     Mental status: [x] Alert and awake  [x] Oriented to person/place/time [x] Able to follow commands    [] Abnormal -     Eyes:   EOM    [x]  Normal    [] Abnormal -   Sclera  [x]  Normal    [] Abnormal -          Discharge [x]  None visible   [] Abnormal -     HENT: [x] Normocephalic, atraumatic  [] Abnormal -       External Ears [x] Normal  [] Abnormal -    Neck: [x] No visualized mass [] Abnormal -     Pulmonary/Chest: [x] Respiratory effort normal   [x] No visualized signs of difficulty breathing or respiratory distress        [] Abnormal -      Musculoskeletal:            [x] Normal range of motion of neck        [] Abnormal -     Neurological:        [x] No Facial Asymmetry (Cranial nerve 7 motor function) (limited exam due to video visit)          [x] No gaze palsy        [] Abnormal -          Skin:        [x] No significant exanthematous lesions or discoloration noted on facial skin         [] Abnormal -            Psychiatric:       [x] Normal Affect [] Abnormal -           Other pertinent observable physical exam findings:looks well        Assessment & Plan:   Diagnoses and all orders for this visit:    1. URI, acute-suspect was covid-doing better now 3 weeks out    2. Shoulder arthritis-use only prn and limit given inc in bp  -     celecoxib (CELEBREX) 200 mg capsule; TAKE ONE CAPSULE every day prn    3. Pain of right hip joint  -     celecoxib (CELEBREX) 200 mg capsule; TAKE ONE CAPSULE every day prn    4. Elevated BP without diagnosis of hypertension-track with dec use of  nsaid and  appt in 2 weeks with bp report  5. Neuropathy  -     DULoxetine (CYMBALTA) 60 mg capsule; Take 1 Cap by mouth daily. We discussed the expected course, resolution and complications of the diagnosis(es) in detail. Medication risks, benefits, costs, interactions, and alternatives were discussed as indicated. I advised her to contact the office if her condition worsens, changes or fails to improve as anticipated. She expressed understanding with the diagnosis(es) and plan. Ancelmo Ramos is a 68 y.o. female being evaluated by a video visit encounter for concerns as above. A caregiver was present when appropriate. Due to this being a TeleHealth encounter (During SAGJK-05 public health emergency), evaluation of the following organ systems was limited: Vitals/Constitutional/EENT/Resp/CV/GI//MS/Neuro/Skin/Heme-Lymph-Imm. Pursuant to the emergency declaration under the [facility], 1135 waiver authority and the [employer] and [employer] Act, this Virtual  Visit was conducted, with patient's (and/or legal guardian's) consent, to reduce the patient's risk of exposure to COVID-19 and provide necessary medical care. Services were provided through a video synchronous discussion virtually to substitute for in-person clinic visit. Patient and provider were located at their individual homes.

## 2020-04-10 NOTE — PROGRESS NOTES
Reached out to patient to assist w/ scheduling a 2 wk eval w/ BP LOG via Vv per PCP.  Patient has been scheduled for 04/24 @ 10:45 - thank you

## 2020-04-24 ENCOUNTER — VIRTUAL VISIT (OUTPATIENT)
Dept: INTERNAL MEDICINE CLINIC | Age: 80
End: 2020-04-24

## 2020-04-24 VITALS — SYSTOLIC BLOOD PRESSURE: 144 MMHG | DIASTOLIC BLOOD PRESSURE: 82 MMHG

## 2020-04-24 DIAGNOSIS — G62.9 NEUROPATHY: ICD-10-CM

## 2020-04-24 DIAGNOSIS — R03.0 ELEVATED BP WITHOUT DIAGNOSIS OF HYPERTENSION: ICD-10-CM

## 2020-04-24 DIAGNOSIS — F51.02 ADJUSTMENT INSOMNIA: Primary | ICD-10-CM

## 2020-04-24 RX ORDER — CLONIDINE HYDROCHLORIDE 0.1 MG/1
0.1 TABLET ORAL 2 TIMES DAILY
Qty: 60 TAB | Refills: 1 | Status: SHIPPED | OUTPATIENT
Start: 2020-04-24 | End: 2020-06-16 | Stop reason: DRUGHIGH

## 2020-04-24 RX ORDER — GABAPENTIN 300 MG/1
CAPSULE ORAL
Qty: 240 CAP | Refills: 0 | Status: SHIPPED | OUTPATIENT
Start: 2020-04-24 | End: 2020-06-16 | Stop reason: SDUPTHER

## 2020-04-24 NOTE — PROGRESS NOTES
Consent: Marilyn Reddy, who was seen by synchronous (real-time) audio-video technology, and/or her healthcare decision maker, is aware that this patient-initiated, Telehealth encounter on 4/24/2020 is a billable service, with coverage as determined by her insurance carrier. She is aware that she may receive a bill and has provided verbal consent to proceed: YES  712  Subjective:   Marilyn Reddy is a 68 y.o. female who was seen for Medication Evaluation (c/o back pain ) and Sleep Problem (c/o poor sleep)      Ongoing insomnia trazodone 100 mg no  Help-on neurontin chronically for back  Pain -will try clonidine sophia given bp staying up  bp 144-148/ no ha no cp   Some cough with being outside dry and  No fever or chills or sob    Current Outpatient Medications   Medication Sig    gabapentin (NEURONTIN) 300 mg capsule TAKE TWO CAPSULES BY MOUTH EVERY MORNING TAKE TWO CAPSULES BY MOUTH EVERY EVENING AND TAKE FOUR CAPSULES BY MOUTH EVERY NIGHT AT BEDTIME    cloNIDine HCL (CATAPRES) 0.1 mg tablet Take 1 Tab by mouth two (2) times a day.  celecoxib (CELEBREX) 200 mg capsule TAKE ONE CAPSULE every day prn    DULoxetine (CYMBALTA) 60 mg capsule Take 1 Cap by mouth daily.  omeprazole (PRILOSEC) 20 mg capsule TAKE ONE CAPSULE BY MOUTH DAILY     No current facility-administered medications for this visit.         No Known Allergies    Past Medical History:   Diagnosis Date    Arthritis     Breast cancer ([facility] [address] 2015     left triple neg    Constipation     Irritable bowel syndrome (IBS)     Lymphedema     left arm    Mitral prolapse     Neurological disorder     chemotherapy induced neuropathy    Radiation therapy complication 8984    left breast ca       ROS  All other systems reviewed and negative, unless mentioned in HPI    Objective:   Vital Signs: (As obtained by patient/caregiver at home)  Visit Vitals  /82        [INSTRUCTIONS:  \"[x]\" Indicates a positive item  \"[]\" Indicates a negative item Pt restraints released, pt resting in bed peacefully, not pulling at medical equipment kd   -- DELETE ALL ITEMS NOT EXAMINED]    Constitutional: [x] Appears well-developed and well-nourished [x] No apparent distress      [] Abnormal -     Mental status: [x] Alert and awake  [x] Oriented to person/place/time [x] Able to follow commands    [] Abnormal -     Eyes:   EOM    [x]  Normal    [] Abnormal -   Sclera  [x]  Normal    [] Abnormal -          Discharge [x]  None visible   [] Abnormal -     HENT: [x] Normocephalic, atraumatic  [] Abnormal -       External Ears [x] Normal  [] Abnormal -    Neck: [x] No visualized mass [] Abnormal -     Pulmonary/Chest: [x] Respiratory effort normal   [x] No visualized signs of difficulty breathing or respiratory distress        [] Abnormal -      Musculoskeletal:            [x] Normal range of motion of neck        [] Abnormal -     Neurological:        [x] No Facial Asymmetry (Cranial nerve 7 motor function) (limited exam due to video visit)          [x] No gaze palsy        [] Abnormal -          Skin:        [x] No significant exanthematous lesions or discoloration noted on facial skin         [] Abnormal -            Psychiatric:       [x] Normal Affect [] Abnormal -           Other pertinent observable physical exam findings:-        Assessment & Plan:   Diagnoses and all orders for this visit:    1. Adjustment insomnia    2. Neuropathy  -     gabapentin (NEURONTIN) 300 mg capsule; TAKE TWO CAPSULES BY MOUTH EVERY MORNING TAKE TWO CAPSULES BY MOUTH EVERY EVENING AND TAKE FOUR CAPSULES BY MOUTH EVERY NIGHT AT BEDTIME    3. Elevated BP without diagnosis of hypertension-add clonidine for bp and hopefully to help with sleep as well  Add allegra for cough  appt in 2 weeks vv  -     cloNIDine HCL (CATAPRES) 0.1 mg tablet; Take 1 Tab by mouth two (2) times a day. We discussed the expected course, resolution and complications of the diagnosis(es) in detail. Medication risks, benefits, costs, interactions, and alternatives were discussed as indicated.   I advised her to contact the office if her condition worsens, changes or fails to improve as anticipated. She expressed understanding with the diagnosis(es) and plan. Dennis Jean is a 68 y.o. female being evaluated by a video visit encounter for concerns as above. A caregiver was present when appropriate. Due to this being a TeleHealth encounter (During [facility]-85 public health emergency), evaluation of the following organ systems was limited: Vitals/Constitutional/EENT/Resp/CV/GI//MS/Neuro/Skin/Heme-Lymph-Imm. Pursuant to the emergency declaration under the [address] [place] [address], [facility] waiver authority and the [employer] and [employer] Act, this Virtual  Visit was conducted, with patient's (and/or legal guardian's) consent, to reduce the patient's risk of exposure to COVID-19 and provide necessary medical care. Services were provided through a video synchronous discussion virtually to substitute for in-person clinic visit. Patient and provider were located at their individual homes.

## 2020-04-27 RX ORDER — GABAPENTIN 300 MG/1
CAPSULE ORAL
Qty: 240 CAP | Refills: 0 | Status: SHIPPED | OUTPATIENT
Start: 2020-04-27 | End: 2020-06-19

## 2020-05-21 ENCOUNTER — TELEPHONE (OUTPATIENT)
Dept: INTERNAL MEDICINE CLINIC | Age: 80
End: 2020-05-21

## 2020-05-22 NOTE — TELEPHONE ENCOUNTER
Info for [employer] provided to patient. Patient did not have any blood pressure concerns at this time.

## 2020-06-16 ENCOUNTER — VIRTUAL VISIT (OUTPATIENT)
Dept: INTERNAL MEDICINE CLINIC | Age: 80
End: 2020-06-16

## 2020-06-16 DIAGNOSIS — G62.9 NEUROPATHY: ICD-10-CM

## 2020-06-16 DIAGNOSIS — R03.0 ELEVATED BP WITHOUT DIAGNOSIS OF HYPERTENSION: ICD-10-CM

## 2020-06-16 DIAGNOSIS — F51.02 ADJUSTMENT INSOMNIA: Primary | ICD-10-CM

## 2020-06-16 DIAGNOSIS — M19.019 SHOULDER ARTHRITIS: ICD-10-CM

## 2020-06-16 RX ORDER — DULOXETINE HCL 100 %
60 POWDER (GRAM) MISCELLANEOUS DAILY
COMMUNITY
Start: 2020-04-10 | End: 2020-06-16 | Stop reason: SDUPTHER

## 2020-06-16 RX ORDER — CLONIDINE HYDROCHLORIDE 0.2 MG/1
0.2 TABLET ORAL
Qty: 90 TAB | Refills: 1 | Status: SHIPPED | OUTPATIENT
Start: 2020-06-16 | End: 2020-08-21 | Stop reason: ALTCHOICE

## 2020-06-16 RX ORDER — MELOXICAM 15 MG/1
15 TABLET ORAL DAILY
COMMUNITY
End: 2020-10-02 | Stop reason: ALTCHOICE

## 2020-06-16 NOTE — PROGRESS NOTES
Consent: Samantha Ramesh, who was seen by synchronous (real-time) audio-video technology, and/or her healthcare decision maker, is aware that this patient-initiated, Telehealth encounter on 6/16/2020 is a billable service, with coverage as determined by her insurance carrier. She is aware that she may receive a bill and has provided verbal consent to proceed: YES  712  Subjective:   Samantha Ramesh is a 68 y.o. female who was seen for Medication Refill (colonodine refill) and Medication Evaluation    Seen at two month follow up concerning chronic insomnia. Trazodone she found not helpful. She has used leftover Sonata and it helped, but I am concerned about risk of respiratory depression given her high doses of Gabapentin and have suggested against this. She is using Clonidine 0.1 mg at night and so far not sure it has made a difference. It has not pushed her blood pressure too low, which is in the 140/73 range. We are going to try bumping the Clonidine dose at night. She also remains on Cymbalta 60 in the day. She does have chronic neuropathy and joint pain and has really not been able to decrease her Gabapentin dose because it does help with her pain. She is now on Meloxicam in place of Celebrex from orthopedic doctor and feels Meloxicam is doing better. She will be in [place] for much of the summer. She denies GI symptoms. Current Outpatient Medications   Medication Sig    meloxicam (Mobic) 15 mg tablet Take 15 mg by mouth daily.  cloNIDine HCL (CATAPRES) 0.2 mg tablet Take 1 Tab by mouth nightly.  gabapentin (NEURONTIN) 300 mg capsule TAKE TWO CAPSULES BY MOUTH EVERY MORNING, TAKE TWO CAPSULES BY MOUTH EVERY EVENING, AND TAKE FOUR CAPSULES BY MOUTH EVERY NIGHT AT BEDTIME    DULoxetine (CYMBALTA) 60 mg capsule Take 1 Cap by mouth daily.  omeprazole (PRILOSEC) 20 mg capsule TAKE ONE CAPSULE BY MOUTH DAILY     No current facility-administered medications for this visit.         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 [INSTRUCTIONS:  \"[x]\" Indicates a positive item  \"[]\" Indicates a negative item  -- DELETE ALL ITEMS NOT EXAMINED]    Constitutional: [x] Appears well-developed and well-nourished [x] No apparent distress      [] Abnormal -     Mental status: [x] Alert and awake  [x] Oriented to person/place/time [x] Able to follow commands    [] Abnormal -     Eyes:   EOM    [x]  Normal    [] Abnormal -   Sclera  [x]  Normal    [] Abnormal -          Discharge [x]  None visible   [] Abnormal -     HENT: [x] Normocephalic, atraumatic  [] Abnormal -       External Ears [x] Normal  [] Abnormal -    Neck: [x] No visualized mass [] Abnormal -     Pulmonary/Chest: [x] Respiratory effort normal   [x] No visualized signs of difficulty breathing or respiratory distress        [] Abnormal -      Musculoskeletal:            [x] Normal range of motion of neck        [] Abnormal -     Neurological:        [x] No Facial Asymmetry (Cranial nerve 7 motor function) (limited exam due to video visit)          [x] No gaze palsy        [] Abnormal -          Skin:        [x] No significant exanthematous lesions or discoloration noted on facial skin         [] Abnormal -            Psychiatric:       [x] Normal Affect [] Abnormal -           Other pertinent observable physical exam findings:-        Assessment & Plan:   Diagnoses and all orders for this visit:    1.  Adjustment insomnia  Encouraged non pharmacologic methods to help she notes when more active in day sleeps better  Will also use inc dose clonidine qhs to help  2. Elevated BP without diagnosis of hypertension  -     cloNIDine HCL (CATAPRES) 0.2 mg tablet; Take 1 Tab by mouth nightly. 3. Shoulder arthritis-now on  mobic  4. Neuropathy  Cont gabapentin and cymbalta appt in 3mo          We discussed the expected course, resolution and complications of the diagnosis(es) in detail. Medication risks, benefits, costs, interactions, and alternatives were discussed as indicated. I advised her to contact the office if her condition worsens, changes or fails to improve as anticipated. She expressed understanding with the diagnosis(es) and plan. Paty Alvarado is a 68 y.o. female being evaluated by a video visit encounter for concerns as above. A caregiver was present when appropriate. Due to this being a TeleHealth encounter (During CXPTQ-46 public health emergency), evaluation of the following organ systems was limited: Vitals/Constitutional/EENT/Resp/CV/GI//MS/Neuro/Skin/Heme-Lymph-Imm. Pursuant to the emergency declaration under the [facility], 1135 waiver authority and the [employer] and [employer] Act, this Virtual  Visit was conducted, with patient's (and/or legal guardian's) consent, to reduce the patient's risk of exposure to COVID-19 and provide necessary medical care. Services were provided through a video synchronous discussion virtually to substitute for in-person clinic visit. Patient and provider were located at their individual homes.

## 2020-06-16 NOTE — PROGRESS NOTES
[id number]       Identified pt with two pt identifiers(name and ). Reviewed record in preparation for visit and have obtained necessary documentation. All patient medications has been reviewed. Chief Complaint   Patient presents with    Medication Refill     colonodine refill    Medication Evaluation       Health Maintenance Due   Topic    Shingrix Vaccine Age 50> (1 of 2)    GLAUCOMA SCREENING Q2Y     Medicare Yearly Exam        There were no vitals filed for this visit. 1.Have you traveled outside of the [address] [place] [address] [address] in the last month No    2. Have you been in close contact with someone who is suspected to have COVID-19 or has tested positive. No    3. Do you have any signs or symptoms. ? 4. Have you been to the ER, urgent care clinic since your last visit? Hospitalized since your last visit? No    5. Have you seen or consulted any other health care providers outside of the [address] [place] [address] since your last visit? Include any pap smears or colon screening. No      7. Are you fasting for blood work today? NO    8. Do you have an Advanced Directive/ Living Will in place? YES  If yes, do we have a copy on file NO  If no, would you like information NO    Patient is accompanied by self I have received verbal consent from Olive Parsons to discuss any/all medical information while they are present in the room.

## 2020-06-18 DIAGNOSIS — G62.9 NEUROPATHY: ICD-10-CM

## 2020-06-19 RX ORDER — GABAPENTIN 300 MG/1
CAPSULE ORAL
Qty: 240 CAP | Refills: 0 | Status: SHIPPED | OUTPATIENT
Start: 2020-06-19 | End: 2020-07-07 | Stop reason: SDUPTHER

## 2020-07-07 DIAGNOSIS — G62.9 NEUROPATHY: ICD-10-CM

## 2020-07-07 NOTE — TELEPHONE ENCOUNTER
----- Message from Jean Garcia sent at 7/6/2020  4:46 PM EDT -----  Regarding: DR Auguste Sic /  REFILL  General Message/Vendor Calls    Gabapentin 300 mg       To be called into [facility]: P  [phone]      Callback required   Best contact number(s): [phone]            Jean [facility]Louis [facility]

## 2020-07-08 RX ORDER — GABAPENTIN 300 MG/1
CAPSULE ORAL
Qty: 240 CAP | Refills: 0 | Status: SHIPPED | OUTPATIENT
Start: 2020-07-08 | End: 2020-08-20 | Stop reason: SDUPTHER

## 2020-07-08 NOTE — PROGRESS NOTES
Pharmacy in [place] so could not send electronically. I called in to pharmacist there.      West Ballard, PharmD, BCPS, [facility]

## 2020-07-28 DIAGNOSIS — M25.551 PAIN OF RIGHT HIP JOINT: ICD-10-CM

## 2020-07-28 RX ORDER — OMEPRAZOLE 20 MG/1
CAPSULE, DELAYED RELEASE ORAL
Qty: 90 CAP | Refills: 0 | Status: SHIPPED | OUTPATIENT
Start: 2020-07-28 | End: 2020-10-12

## 2020-08-20 DIAGNOSIS — G62.9 NEUROPATHY: ICD-10-CM

## 2020-08-20 RX ORDER — DULOXETIN HYDROCHLORIDE 60 MG/1
60 CAPSULE, DELAYED RELEASE ORAL DAILY
Qty: 90 CAP | Refills: 1 | Status: SHIPPED | OUTPATIENT
Start: 2020-08-20 | End: 2021-04-26

## 2020-08-20 RX ORDER — GABAPENTIN 300 MG/1
CAPSULE ORAL
Qty: 240 CAP | Refills: 0 | Status: SHIPPED | OUTPATIENT
Start: 2020-08-20 | End: 2020-09-21 | Stop reason: SDUPTHER

## 2020-08-20 NOTE — TELEPHONE ENCOUNTER
Requesting 90 day supply    Also requesting a script for \"cinotta\" sleeping aid medication , states patient can not sleep and other medications are not working.        He can be reached at [phone]

## 2020-08-20 NOTE — TELEPHONE ENCOUNTER
Notified patient's spouse PCP is unable to e-scribe controlled meds to another state. He requests it be sent to [facility] & he will see if [facility] can transfer to [place] internally. Reviewed her  & last refill for Gabapentin was filled on 6/19/2020 at [facility]. Advised virtual visit to discuss options for sleep meds. Virtual scheduled for tomorrow at 9:15.

## 2020-08-21 ENCOUNTER — TELEPHONE (OUTPATIENT)
Dept: INTERNAL MEDICINE CLINIC | Age: 80
End: 2020-08-21

## 2020-08-21 ENCOUNTER — VIRTUAL VISIT (OUTPATIENT)
Dept: INTERNAL MEDICINE CLINIC | Age: 80
End: 2020-08-21
Payer: MEDICARE

## 2020-08-21 DIAGNOSIS — K21.9 GASTROESOPHAGEAL REFLUX DISEASE WITHOUT ESOPHAGITIS: Primary | ICD-10-CM

## 2020-08-21 PROCEDURE — 99443 PR PHYS/QHP TELEPHONE EVALUATION 21-30 MIN: CPT | Performed by: INTERNAL MEDICINE

## 2020-08-21 RX ORDER — OMEPRAZOLE 20 MG/1
20 CAPSULE, DELAYED RELEASE ORAL DAILY
Qty: 30 CAP | Refills: 5 | Status: SHIPPED | OUTPATIENT
Start: 2020-08-21 | End: 2021-04-05 | Stop reason: SDUPTHER

## 2020-08-21 NOTE — PROGRESS NOTES
Phone call during  covid crisis-she is in [address] and has minimal wifi connection so can not do doxy  She went ot [place] without her gabapentin or prilosec bottles and wants me to call these to [facility]-explained that as the gabapentin is controlled we can not call it across state lines-also explained that 240 capsules of this med was authorized at the [facility] in [place] yesterday so can not do a new rx at another pharmacy in [place]-did call in the prilosec for her.   She has chronic insomnia and felt the clonidine was not helpful so weaned off it-requests sedative but I ajm  Not comfortable with this given exceedingly high doses of gabapentin that she takes chronically  Offered mirtazepine and trazodone as options she declines  Discussed could transition from gabapentin to lyrica when she is back in [place]-anticipates return in 2 weeks  Explained would need slow titration off the high dose gabapentin  Time on phone 22 min

## 2020-08-21 NOTE — TELEPHONE ENCOUNTER
Patient is calling back , declined to leave information with PSR, she can be reached at [phone]

## 2020-09-21 DIAGNOSIS — G62.9 NEUROPATHY: ICD-10-CM

## 2020-09-21 NOTE — TELEPHONE ENCOUNTER
----- Message from Reyes Lincoln sent at 9/21/2020  1:11 PM EDT -----  Regarding: Dr. Valentino Spanner (if not patient): Imani Anna   Relationship of caller (if not patient):     Best contact number(s): [phone]  Name of medication and dosage if known: Gabapentin 300 mg   Is patient out of this medication (yes/no): Yes   Pharmacy name: [address] [place] [address] listed in chart? (yes/no): Yes   Pharmacy phone number: [phone]   Date of last visit: 08/21/2020  Details to clarify the request: Pt would like this rushed.

## 2020-09-22 RX ORDER — GABAPENTIN 300 MG/1
CAPSULE ORAL
Qty: 240 CAP | Refills: 0 | Status: SHIPPED | OUTPATIENT
Start: 2020-09-22 | End: 2020-11-06

## 2020-09-22 NOTE — TELEPHONE ENCOUNTER
----- Message from Bigg Mujica sent at 9/22/2020  3:12 PM EDT -----  Regarding: Dr. Lyric Quinn (if not patient): Luis Pelaez  Relationship of caller (if not patient):   Best contact number(s): [phone]  Name of medication and dosage if known: gabapentin 300mg   Is patient out of this medication (yes/no): yes  Pharmacy name: [facility] listed in chart? (yes/no):  Pharmacy phone number: n/a  Date of last visit: 8/21/20  Details to clarify the request- Pt needs meds today, due to  leaving out of town.  Mr Hernandez Late called yesterday  for request

## 2020-09-28 ENCOUNTER — TELEPHONE (OUTPATIENT)
Dept: INTERNAL MEDICINE CLINIC | Age: 80
End: 2020-09-28

## 2020-09-28 ENCOUNTER — TRANSCRIBE ORDER (OUTPATIENT)
Dept: FAMILY MEDICINE CLINIC | Age: 80
End: 2020-09-28

## 2020-09-28 NOTE — TELEPHONE ENCOUNTER
Reached out to patient ( x 3 ) to assist w/ scheduling a Vv for medication evaluation per PCP.  No answer left VM  - thank you

## 2020-09-28 NOTE — TELEPHONE ENCOUNTER
----- Message from Weston Kilgore sent at 9/28/2020  1:28 PM EDT -----  Regarding: Rocael Dennis MD  General Message/Vendor Calls    Caller's first and last name: Pita Chapa [[id number]]      Reason for call: rx consultation      Callback required yes/no and why: Yes      Best contact number(s): [phone]; [phone] (C)      Details to clarify the request: pt is requesting callback-pt visited Urgent care-Dr. Xiomy Ford ([phone]) located [place]-elevated bp; headaches rx consultation-chlorthalidone 25mg-pt would like consultation with current pcp regarding rx- pt advised she is currently taking all rx on file including rx prescribed by Dr. Elena Dominguez.       Terry Ravi

## 2020-09-29 ENCOUNTER — TELEPHONE (OUTPATIENT)
Dept: INTERNAL MEDICINE CLINIC | Age: 80
End: 2020-09-29

## 2020-09-29 NOTE — TELEPHONE ENCOUNTER
Per PCP cannot address blood pressure & headache concerns over the telephone. Really needs a visit in-person to be evaluated clinically to give proper advice. Can continue in-person care in [place] until can be seen in-person here. V/m message left to this affect. Advised if headaches do become more severe may need another ER visit to be evaluated clinically. Office number left if patient has additional questions or concerns.

## 2020-09-29 NOTE — TELEPHONE ENCOUNTER
----- Message from Lilly Ramirez sent at 9/29/2020  1:20 PM EDT -----  Regarding: Dr. Rosa Maria Vega first and last name: pt  Reason for call: Information  Callback required yes/no and why: Yes, consultation   Best contact number(s): [phone] or [phone] (can leave a message to either one)  Details to clarify the request: pt is currently in [place]  for another month, she has some information to relay: CBC came back normal, CMP (CMT?) blood test came in clear, but BP on Sunday was 170/98, last was 180/110. She is now on \"chlorthalidone\" 25mg and is wondering if Dr. Anastasiya Haley wants more tests to be done. Also says that now it is only over her right eye (was over whole head for a month), so medication is working. Can call emergency care place phone number is [phone] fax is [phone] ( is Particia Erps, is not in [facility])  Does she need CAT scan or not?  X-rays were normal

## 2020-09-30 NOTE — TELEPHONE ENCOUNTER
----- Message from Nelida Anderson sent at 9/29/2020  6:04 PM EDT -----  Regarding: Dr. Scar Mccord Message/Vendor Calls    Caller's first and last name: Patient      Reason for call: Requesting to schedule a VV with Dr. Mirta Morley in regards to a recent elevated BP reading.        Callback required yes/no and why: Yes      Best contact number(s): [phone]      Details to clarify the request: Patient is currently in [place], and the best time to reach her will be from 8-12 am.       Nelida Anderson

## 2020-10-02 ENCOUNTER — VIRTUAL VISIT (OUTPATIENT)
Dept: INTERNAL MEDICINE CLINIC | Age: 80
End: 2020-10-02
Payer: MEDICARE

## 2020-10-02 DIAGNOSIS — I10 HTN, GOAL BELOW 130/80: Primary | ICD-10-CM

## 2020-10-02 DIAGNOSIS — Z85.3 HISTORY OF LEFT BREAST CANCER: ICD-10-CM

## 2020-10-02 DIAGNOSIS — F51.02 ADJUSTMENT INSOMNIA: ICD-10-CM

## 2020-10-02 PROCEDURE — 99442 PR PHYS/QHP TELEPHONE EVALUATION 11-20 MIN: CPT | Performed by: INTERNAL MEDICINE

## 2020-10-02 RX ORDER — CHLORTHALIDONE 25 MG/1
25 TABLET ORAL DAILY
COMMUNITY
End: 2020-10-12 | Stop reason: ALTCHOICE

## 2020-10-05 ENCOUNTER — TELEPHONE (OUTPATIENT)
Dept: INTERNAL MEDICINE CLINIC | Age: 80
End: 2020-10-05

## 2020-10-05 ENCOUNTER — HOSPITAL ENCOUNTER (EMERGENCY)
Facility: HOSPITAL | Age: 80
Discharge: HOME | End: 2020-10-05
Attending: EMERGENCY MEDICINE
Payer: MEDICARE

## 2020-10-05 VITALS
TEMPERATURE: 97.3 F | HEART RATE: 84 BPM | SYSTOLIC BLOOD PRESSURE: 123 MMHG | RESPIRATION RATE: 18 BRPM | OXYGEN SATURATION: 97 % | DIASTOLIC BLOOD PRESSURE: 69 MMHG

## 2020-10-05 DIAGNOSIS — E86.0 DEHYDRATION: ICD-10-CM

## 2020-10-05 DIAGNOSIS — R51.9 ACUTE INTRACTABLE HEADACHE, UNSPECIFIED HEADACHE TYPE: Primary | ICD-10-CM

## 2020-10-05 DIAGNOSIS — R03.0 ELEVATED BLOOD PRESSURE READING: ICD-10-CM

## 2020-10-05 LAB
ANION GAP SERPL CALC-SCNC: 10 MEQ/L (ref 3–15)
BACTERIA URNS QL MICRO: ABNORMAL /HPF
BASOPHILS # BLD: 0.05 K/UL (ref 0.01–0.1)
BASOPHILS NFR BLD: 0.9 %
BILIRUB UR QL STRIP.AUTO: NEGATIVE MG/DL
BUN SERPL-MCNC: 27 MG/DL (ref 8–20)
CALCIUM SERPL-MCNC: 9.8 MG/DL (ref 8.9–10.3)
CHLORIDE SERPL-SCNC: 100 MEQ/L (ref 98–109)
CLARITY UR REFRACT.AUTO: CLEAR
CO2 SERPL-SCNC: 25 MEQ/L (ref 22–32)
COLOR UR AUTO: YELLOW
CREAT SERPL-MCNC: 1.1 MG/DL (ref 0.6–1.1)
DIFFERENTIAL METHOD BLD: ABNORMAL
EOSINOPHIL # BLD: 0.35 K/UL (ref 0.04–0.36)
EOSINOPHIL NFR BLD: 6.3 %
ERYTHROCYTE [DISTWIDTH] IN BLOOD BY AUTOMATED COUNT: 12.8 % (ref 11.7–14.4)
GFR SERPL CREATININE-BSD FRML MDRD: 48 ML/MIN/1.73M*2
GLUCOSE SERPL-MCNC: 125 MG/DL (ref 70–99)
GLUCOSE UR STRIP.AUTO-MCNC: NEGATIVE MG/DL
HCT VFR BLDCO AUTO: 38.2 % (ref 35–45)
HGB BLD-MCNC: 12.7 G/DL (ref 11.8–15.7)
HGB UR QL STRIP.AUTO: NEGATIVE
HYALINE CASTS #/AREA URNS LPF: ABNORMAL /LPF
IMM GRANULOCYTES # BLD AUTO: 0.02 K/UL (ref 0–0.08)
IMM GRANULOCYTES NFR BLD AUTO: 0.4 %
KETONES UR STRIP.AUTO-MCNC: NEGATIVE MG/DL
LEUKOCYTE ESTERASE UR QL STRIP.AUTO: 2
LYMPHOCYTES # BLD: 1.45 K/UL (ref 1.2–3.5)
LYMPHOCYTES NFR BLD: 26.2 %
MCH RBC QN AUTO: 30.5 PG (ref 28–33.2)
MCHC RBC AUTO-ENTMCNC: 33.2 G/DL (ref 32.2–35.5)
MCV RBC AUTO: 91.6 FL (ref 83–98)
MONOCYTES # BLD: 0.5 K/UL (ref 0.28–0.8)
MONOCYTES NFR BLD: 9 %
NEUTROPHILS # BLD: 3.16 K/UL (ref 1.7–7)
NEUTS SEG NFR BLD: 57.2 %
NITRITE UR QL STRIP.AUTO: NEGATIVE
NRBC BLD-RTO: 0 %
PDW BLD AUTO: 8.9 FL (ref 9.4–12.3)
PH UR STRIP.AUTO: 6.5 [PH]
PLATELET # BLD AUTO: 323 K/UL (ref 150–369)
POTASSIUM SERPL-SCNC: 3.8 MEQ/L (ref 3.6–5.1)
PROT UR QL STRIP.AUTO: NEGATIVE
RBC # BLD AUTO: 4.17 M/UL (ref 3.93–5.22)
RBC #/AREA URNS HPF: ABNORMAL /HPF
SODIUM SERPL-SCNC: 135 MEQ/L (ref 136–144)
SP GR UR REFRACT.AUTO: 1.01
SQUAMOUS URNS QL MICRO: ABNORMAL /HPF
TROPONIN I SERPL-MCNC: <0.02 NG/ML
UROBILINOGEN UR STRIP-ACNC: 0.2 EU/DL
WBC # BLD AUTO: 5.53 K/UL (ref 3.8–10.5)
WBC #/AREA URNS HPF: ABNORMAL /HPF

## 2020-10-05 PROCEDURE — 84484 ASSAY OF TROPONIN QUANT: CPT | Performed by: PHYSICIAN ASSISTANT

## 2020-10-05 PROCEDURE — 63600000 HC DRUGS/DETAIL CODE: Performed by: PHYSICIAN ASSISTANT

## 2020-10-05 PROCEDURE — 87015 SPECIMEN INFECT AGNT CONCNTJ: CPT | Performed by: PHYSICIAN ASSISTANT

## 2020-10-05 PROCEDURE — 86618 LYME DISEASE ANTIBODY: CPT | Performed by: PHYSICIAN ASSISTANT

## 2020-10-05 PROCEDURE — 93005 ELECTROCARDIOGRAM TRACING: CPT | Performed by: PHYSICIAN ASSISTANT

## 2020-10-05 PROCEDURE — 99284 EMERGENCY DEPT VISIT MOD MDM: CPT | Mod: 25

## 2020-10-05 PROCEDURE — 85025 COMPLETE CBC W/AUTO DIFF WBC: CPT

## 2020-10-05 PROCEDURE — 25800000 HC PHARMACY IV SOLUTIONS: Performed by: PHYSICIAN ASSISTANT

## 2020-10-05 PROCEDURE — 3E0333Z INTRODUCTION OF ANTI-INFLAMMATORY INTO PERIPHERAL VEIN, PERCUTANEOUS APPROACH: ICD-10-PCS | Performed by: EMERGENCY MEDICINE

## 2020-10-05 PROCEDURE — 87086 URINE CULTURE/COLONY COUNT: CPT | Performed by: PHYSICIAN ASSISTANT

## 2020-10-05 PROCEDURE — 85652 RBC SED RATE AUTOMATED: CPT | Performed by: PHYSICIAN ASSISTANT

## 2020-10-05 PROCEDURE — 81003 URINALYSIS AUTO W/O SCOPE: CPT | Performed by: PHYSICIAN ASSISTANT

## 2020-10-05 PROCEDURE — 85025 COMPLETE CBC W/AUTO DIFF WBC: CPT | Performed by: EMERGENCY MEDICINE

## 2020-10-05 PROCEDURE — 3E0337Z INTRODUCTION OF ELECTROLYTIC AND WATER BALANCE SUBSTANCE INTO PERIPHERAL VEIN, PERCUTANEOUS APPROACH: ICD-10-PCS | Performed by: EMERGENCY MEDICINE

## 2020-10-05 PROCEDURE — U0002 COVID-19 LAB TEST NON-CDC: HCPCS | Performed by: PHYSICIAN ASSISTANT

## 2020-10-05 PROCEDURE — 87207 SMEAR SPECIAL STAIN: CPT | Performed by: PHYSICIAN ASSISTANT

## 2020-10-05 PROCEDURE — 36415 COLL VENOUS BLD VENIPUNCTURE: CPT

## 2020-10-05 PROCEDURE — 80048 BASIC METABOLIC PNL TOTAL CA: CPT | Mod: 59 | Performed by: EMERGENCY MEDICINE

## 2020-10-05 PROCEDURE — 96361 HYDRATE IV INFUSION ADD-ON: CPT

## 2020-10-05 PROCEDURE — 96374 THER/PROPH/DIAG INJ IV PUSH: CPT

## 2020-10-05 RX ORDER — OMEPRAZOLE 20 MG/1
CAPSULE, DELAYED RELEASE ORAL
COMMUNITY
Start: 2020-08-21

## 2020-10-05 RX ORDER — METOCLOPRAMIDE HYDROCHLORIDE 5 MG/ML
5 INJECTION INTRAMUSCULAR; INTRAVENOUS ONCE
Status: COMPLETED | OUTPATIENT
Start: 2020-10-05 | End: 2020-10-05

## 2020-10-05 RX ORDER — DIPHENHYDRAMINE HCL 50 MG/ML
25 VIAL (ML) INJECTION ONCE
Status: COMPLETED | OUTPATIENT
Start: 2020-10-05 | End: 2020-10-05

## 2020-10-05 RX ORDER — MELOXICAM 15 MG/1
TABLET ORAL
COMMUNITY
Start: 2020-09-06

## 2020-10-05 RX ORDER — KETOROLAC TROMETHAMINE 15 MG/ML
15 INJECTION, SOLUTION INTRAMUSCULAR; INTRAVENOUS ONCE
Status: COMPLETED | OUTPATIENT
Start: 2020-10-05 | End: 2020-10-05

## 2020-10-05 RX ORDER — GABAPENTIN 300 MG/1
CAPSULE ORAL
COMMUNITY
Start: 2020-09-22

## 2020-10-05 RX ORDER — CHLORTHALIDONE 25 MG/1
TABLET ORAL
COMMUNITY
Start: 2020-09-27

## 2020-10-05 RX ORDER — LINACLOTIDE 290 UG/1
CAPSULE, GELATIN COATED ORAL
COMMUNITY
Start: 2020-08-02

## 2020-10-05 RX ADMIN — KETOROLAC TROMETHAMINE 15 MG: 15 INJECTION, SOLUTION INTRAMUSCULAR; INTRAVENOUS at 22:17

## 2020-10-05 RX ADMIN — SODIUM CHLORIDE 500 ML: 9 INJECTION, SOLUTION INTRAVENOUS at 20:52

## 2020-10-05 RX ADMIN — DIPHENHYDRAMINE HYDROCHLORIDE 25 MG: 50 INJECTION INTRAMUSCULAR; INTRAVENOUS at 20:52

## 2020-10-05 RX ADMIN — METOCLOPRAMIDE 5 MG: 5 INJECTION, SOLUTION INTRAMUSCULAR; INTRAVENOUS at 20:52

## 2020-10-05 SDOH — HEALTH STABILITY: MENTAL HEALTH: HOW OFTEN DO YOU HAVE A DRINK CONTAINING ALCOHOL?: NEVER

## 2020-10-05 ASSESSMENT — ENCOUNTER SYMPTOMS
SORE THROAT: 0
DIAPHORESIS: 0
COUGH: 0
HEADACHES: 1
SPEECH DIFFICULTY: 0
RHINORRHEA: 0
NAUSEA: 0
HEMATURIA: 0
NUMBNESS: 1
FEVER: 0
SINUS PAIN: 0
SHORTNESS OF BREATH: 0
VOMITING: 0
BACK PAIN: 0
WEAKNESS: 0
ABDOMINAL PAIN: 0
FACIAL ASYMMETRY: 0
DIZZINESS: 1
NECK PAIN: 0
DIFFICULTY URINATING: 0
CONFUSION: 0
PHOTOPHOBIA: 1

## 2020-10-05 NOTE — TELEPHONE ENCOUNTER
----- Message from Shala Avila sent at 10/2/2020  9:36 PM EDT -----  Regarding: Dr. Maryam Hui Message/Vendor Calls    Caller's first and last name:  Hipolito Moreira, [place]      Reason for call:  Need to possibly be seen by an neurologist      Callback required yes/no and why:  Yes      Best contact number(s):  [phone]      Details to clarify the request:  Pt states after talking with Dr. Kamila Harvey on 10/02/20, she went to the [facility] and had a CT Scan done, but nothing was wrong. Pt states the ER physician told her to follow-up with her PCP and get a referral to see a neurologist.  Pt is requesting to see a neurologist.  Please contact Pt.     Thanks,  Shala Avila

## 2020-10-05 NOTE — TELEPHONE ENCOUNTER
Patient reports bp readings ranging from systolic of 310-824 & diastolic 502-931. She is currently living in [place] & has been to the ER for eval to be told nothing acute is going on. She has an upcoming appt with cardiology on 10/20. Patient questions if it is safe to drive back to [place] to see a neurologist or should she see one in [place] which is closer. Advised patient given how high her bp has been advised she see neuro in [facility] since this provider is closer to her current address. Patient agreed. Patient had no further questions or concerns at this time.

## 2020-10-06 LAB
ATRIAL RATE: 81
ERYTHROCYTE [SEDIMENTATION RATE] IN BLOOD BY WESTERGREN METHOD: 3 MM/HR
P AXIS: 50
PARASITE BLD: NORMAL
PARASITE BLD: NORMAL
PR INTERVAL: 176
QRS DURATION: 86
QT INTERVAL: 394
QTC CALCULATION(BAZETT): 457
R AXIS: 26
SARS-COV-2 RNA RESP QL NAA+PROBE: NEGATIVE
T WAVE AXIS: 31
VENTRICULAR RATE: 81

## 2020-10-06 RX ORDER — CLONIDINE HYDROCHLORIDE 0.2 MG/1
0.2 TABLET ORAL
Qty: 30 TAB | Refills: 0 | Status: SHIPPED | OUTPATIENT
Start: 2020-10-06 | End: 2020-10-06 | Stop reason: SDUPTHER

## 2020-10-06 RX ORDER — MELOXICAM 15 MG/1
15 TABLET ORAL DAILY
Qty: 30 TAB | Refills: 5
Start: 2020-10-06 | End: 2020-10-12

## 2020-10-06 RX ORDER — CLONIDINE HYDROCHLORIDE 0.2 MG/1
0.2 TABLET ORAL
Qty: 30 TAB | Refills: 0 | Status: SHIPPED | OUTPATIENT
Start: 2020-10-06 | End: 2020-10-12 | Stop reason: ALTCHOICE

## 2020-10-06 NOTE — DISCHARGE INSTRUCTIONS
Check your blood pressure twice daily and keep record to review with your family doctor    Take 650 mg of Tylenol every 4 hours as needed for pain as instructed on the bottle     Drink plenty of fluids    Return to the emergency department for worsening of symptoms or if you develop any new concerning symptoms

## 2020-10-06 NOTE — ED PROVIDER NOTES
HPI     Chief Complaint   Patient presents with   • Headache       80-year-old female with history of breast cancer last treated with chemo radiation 5 years ago with secondary left upper extremity lymphedema and bilateral feet neuropathy presents ED for evaluation of headache and elevated blood pressure.  Patient has had issues with elevated blood pressure since April.  She reports having a telemetry med appointment with her family doctor in [place] at that time where her  checked her blood pressure and systolically was 140s.  She reports her baseline blood pressure is normally 110s over 70.  She was started on a medication at that time which she is unsure what it was but she had significant nausea so she stopped it after taking 2 weeks.  She has not taken any medication or checked her blood pressure regularly since then.  Over the last month she has had chronic daily frontal headaches that worsened throughout the day.  Occasionally associated with photophobia.  She has been spending her summer in [place] and was seen out of [place] urgent care 8 days ago where she was started on 25 mg of chlorthalidone after presenting for a headache and blood pressure was 180/110.  She was then evaluated 3 days ago at [facility] for her headache again and found to have a blood pressure of 195/109.  She reports having a negative scan of her head which she brought the report with her and was told she had protein in her urine.  She followed up again at the [place] urgent care yesterday and was found to have blood pressure systolically in the 130s.  She came here locally to visit her daughter after she was concerned with the patient being alone in [place] and found to have a blood pressure today of 185/105.  She is planning to returning to [place] tomorrow.  She reports having fleeting left-sided chest pain 2 days ago, none since.  She reports right arm numbness for less than a minute while she was  sleeping the night after she had had her scan of her head, denies any new extremity numbness since that time.  patient reports chronic dizziness which is unchanged.  She denies history of migraines, recent head injury, extremity weakness, shortness of breath, increase in salt intake, fainting, vision changes.  Patient's daughter denies facial droop, slurred speech, confusion.       History provided by:  Patient (daughter)  Headache  Associated symptoms: dizziness (chronic), numbness (right arm <1 minute while sleeping 3 nights ago, none since. chronic BL feet neuroopathy) and photophobia    Associated symptoms: no abdominal pain, no back pain, no congestion, no cough, no fever, no nausea, no neck pain, no neck stiffness, no sore throat, no vomiting and no weakness         Patient History     History reviewed. No pertinent past medical history.    History reviewed. No pertinent surgical history.    History reviewed. No pertinent family history.    Social History     Tobacco Use   • Smoking status: Never Smoker   • Smokeless tobacco: Never Used   Substance Use Topics   • Alcohol use: Never     Frequency: Never   • Drug use: Never       Systems Reviewed from Nursing Triage:          Review of Systems     Review of Systems   Constitutional: Negative for diaphoresis and fever.   HENT: Negative for congestion, rhinorrhea, sinus pain and sore throat.    Eyes: Positive for photophobia. Negative for visual disturbance.   Respiratory: Negative for cough and shortness of breath.    Cardiovascular: Negative for chest pain.   Gastrointestinal: Negative for abdominal pain, nausea and vomiting.   Genitourinary: Negative for difficulty urinating and hematuria.   Musculoskeletal: Negative for back pain, gait problem, neck pain and neck stiffness.   Skin: Negative for rash.   Neurological: Positive for dizziness (chronic), numbness (right arm <1 minute while sleeping 3 nights ago, none since. chronic BL feet neuroopathy) and  headaches. Negative for syncope, facial asymmetry, speech difficulty and weakness.   Psychiatric/Behavioral: Negative for confusion.        Physical Exam     ED Triage Vitals [10/05/20 1918]   Temp Heart Rate Resp BP SpO2   36.3 °C (97.3 °F) 86 18 123/69 98 %      Temp src Heart Rate Source Patient Position BP Location FiO2 (%) (Set)   -- -- -- -- --       Pulse Ox %: 98 % (10/05/20 1918)  Pulse Ox Interpretation: Normal (10/05/20 1918)  Heart Rate: 86 (10/05/20 1918)  Rhythm Strip Interpretation: Normal Sinus Rhythm (10/05/20 1918)    No data found.                                       Physical Exam  Vitals signs and nursing note reviewed.   Constitutional:       General: She is not in acute distress.     Appearance: Normal appearance. She is not diaphoretic.   HENT:      Head: Normocephalic and atraumatic.      Right Ear: Tympanic membrane normal.      Left Ear: Tympanic membrane normal.      Nose: Nose normal.      Right Sinus: No maxillary sinus tenderness or frontal sinus tenderness.      Left Sinus: No maxillary sinus tenderness or frontal sinus tenderness.      Comments: No temporal artery TTP     Mouth/Throat:      Mouth: Mucous membranes are moist.   Eyes:      Extraocular Movements: Extraocular movements intact.      Conjunctiva/sclera: Conjunctivae normal.      Pupils: Pupils are equal, round, and reactive to light.   Neck:      Musculoskeletal: Neck supple.      Comments: No meningeal signs  Cardiovascular:      Rate and Rhythm: Normal rate and regular rhythm.      Heart sounds: No murmur.   Pulmonary:      Effort: Pulmonary effort is normal. No respiratory distress.      Breath sounds: Normal breath sounds.   Abdominal:      General: Abdomen is flat.      Tenderness: There is no abdominal tenderness. There is no guarding or rebound.   Musculoskeletal:      Right lower leg: Edema (trace ) present.      Left lower leg: Edema (trace) present.   Skin:     General: Skin is warm and dry.   Neurological:       General: No focal deficit present.      Mental Status: She is alert and oriented to person, place, and time.      Cranial Nerves: No cranial nerve deficit, dysarthria or facial asymmetry.      Sensory: Sensation is intact.      Motor: Motor function is intact. No pronator drift.      Coordination: Finger-Nose-Finger Test normal.   Psychiatric:         Mood and Affect: Mood normal.              Procedures    Labs Reviewed   CBC AND DIFF - Abnormal       Result Value    WBC 5.53      RBC 4.17      Hemoglobin 12.7      Hematocrit 38.2      MCV 91.6      MCH 30.5      MCHC 33.2      RDW 12.8      Platelets 323      MPV 8.9 (*)     Differential Type Auto      nRBC 0.0      Immature Granulocytes 0.4      Neutrophils 57.2      Lymphocytes 26.2      Monocytes 9.0      Eosinophils 6.3      Basophils 0.9      Immature Granulocytes, Absolute 0.02      Neutrophils, Absolute 3.16      Lymphocytes, Absolute 1.45      Monocytes, Absolute 0.50      Eosinophils, Absolute 0.35      Basophils, Absolute 0.05     BASIC METABOLIC PANEL - Abnormal    Sodium 135 (*)     Potassium 3.8      Chloride 100      CO2 25      BUN 27 (*)     Creatinine 1.1      Glucose 125 (*)     Calcium 9.8      eGFR 48.0 (*)     Anion Gap 10     UA REFLEX CULTURE (MACROSCOPIC) - Abnormal    Color, Urine Yellow      Clarity, Urine Clear      Specific Gravity, Urine 1.013      pH, Urine 6.5      Leukocyte Esterase +2 (*)     Nitrite, Urine Negative      Protein, Urine Negative      Glucose, Urine Negative      Ketones, Urine Negative      Urobilinogen, Urine 0.2      Bilirubin, Urine Negative      Blood, Urine Negative     UA MICROSCOPIC - Abnormal    RBC, Urine 0 TO 4      WBC, Urine 10 TO 20 (*)     Squamous Epithelial None Seen      Hyaline Cast None Seen      Bacteria, Urine None Seen     BABESIA SMEAR - Normal    Babesia Smear No Parasites Seen     ANAPLASMA / EHRLICHIA SMEAR - Normal    Anaplasma / Ehrlicha Smear No Anaplasma/Ehrlichia species observed.       SARS-COV-2 (COVID 19), PCR - Normal    SARS-CoV-2 (COVID-19) Negative     TROPONIN I - Normal    Troponin I <0.02     SEDIMENTATION RATE - Normal    Sed Rate 3     URINE CULTURE   RAINBOW DRAW PANEL    Narrative:     The following orders were created for panel order [place] Draw Panel.  Procedure                               Abnormality         Status                     ---------                               -----------         ------                     RAINBOW LT BLUE[[id number]]                                  Final result               RAINBOW GOLD[[id number]]                                     Final result                 Please view results for these tests on the individual orders.   RAINBOW DRAW PANEL    Narrative:     The following orders were created for panel order [place] Draw Panel.  Procedure                               Abnormality         Status                     ---------                               -----------         ------                     RAINBOW LT GREEN[[id number]]                                 Final result                 Please view results for these tests on the individual orders.   URINALYSIS REFLEX CULTURE (ED AND OUTPATIENT ONLY)    Narrative:     The following orders were created for panel order UA with reflex culture.  Procedure                               Abnormality         Status                     ---------                               -----------         ------                     UA Reflex to Culture (Ma...[[id number]]  Abnormal            Final result               UA Microscopic[[id number]]               Abnormal            Final result                 Please view results for these tests on the individual orders.   LYME ABS TOTAL W/RELEX WESTERN BLOT   RAINBOW LT BLUE   RAINBOW GOLD   RAINBOW LT GREEN       ECG 12 lead   Final Result                  ED Course & MDM     MDM  Number of Diagnoses or Management Options  Acute intractable headache, unspecified  headache type:   Dehydration:   Elevated blood pressure reading:      Amount and/or Complexity of Data Reviewed  Clinical lab tests: ordered and reviewed  Decide to obtain previous medical records or to obtain history from someone other than the patient: yes  Obtain history from someone other than the patient: yes  Independent visualization of images, tracings, or specimens: yes    Patient Progress  Patient progress: resolved           ED Course as of Oct 07 0001   Mon Oct 05, 2020   2023 Cbc and bmp reviewed, BUN elevated concerning for dehydration  Nml wbc, Cr    Added troponin due to brief left sided chest discomfort 2 days ago  UA due to pt being told about proteinuria a few days ago  IVF and IV benadryl/reglan for HA and dehydration on labs  CT head without IV contrast from 3 days ago in [facility] report reviewed: no acute findings seen    [TC]   2108 EKG interpretation: 81 NSR, nml axis, no St elevation, nml QT/Qtc    [TC]   2121 Troponin I: <0.02 [TC]   2146 Headache improved but still present.  Will order Toradol.  [place] panel added    [TC]   2230 UA without protein  Spoke with MRI tech who is coming in currently to perform MRI for stroke pt in ED, will not perform MRI brain with IV contrast to r/o mets due to testing not being emergent   to attending, esr, re-assessment, and dispo pending    [TC]   2256 After Toradol patient is now pain-free.  Patient feels well enough to go home.  Will recommend outpatient MRI    [CHALINO]      ED Course User Index  [CHALINO] Curt Short DO  [TC] Ashley Mc PA C         Clinical Impressions as of Oct 07 0001   Acute intractable headache, unspecified headache type   Elevated blood pressure reading   Dehydration     Disposition  Discharged      Ashley Mc PA C  10/07/20 0002

## 2020-10-06 NOTE — ED ATTESTATION NOTE
I have personally seen and examined the patient.  I reviewed and agree with physician assistant / nurse practitioner’s assessment and plan of care.     Exam: Patient is uncomfortable but stable and in no acute distress.  Her vital signs are normal and she is afebrile.  Patient is awake alert and oriented without any focal neuro deficit.  She has some mild photophobia but otherwise her neuro exam is intact.  Heart is regular and there is no respiratory distress.    Plan: Patient's been suffering from a headache for approximately a month.  She is been on multiple medications as an outpatient had an outpatient CT scan that was also read as unremarkable.  Will initiate migraine protocol and try to arrange for an MRI of the brain to rule out metastatic disease in a patient with breast cancer.  If you are unable to get the patient comfortable patient will be admitted and MRI can be done as an inpatient           Curt Short DO  10/05/20 [id number]

## 2020-10-07 ENCOUNTER — TELEPHONE (OUTPATIENT)
Dept: INTERNAL MEDICINE CLINIC | Age: 80
End: 2020-10-07

## 2020-10-07 LAB — B BURGDOR AB SER IA-ACNC: 0.52 RATIO

## 2020-10-07 ASSESSMENT — ENCOUNTER SYMPTOMS: NECK STIFFNESS: 0

## 2020-10-07 NOTE — TELEPHONE ENCOUNTER
Patient called to advise she was staying with a friend in [facility]. Patient had covid test done & would like to know if PCP received results. PSR advised patient to contact provider who ordered covid test for results. Patient was thankful for information 7 will call hospital where test was done. Patient request nurse please call to advise if PCP received documentation for testing for PCP to keep on file. Please call patient to advise.   [phone]

## 2020-10-08 ENCOUNTER — TELEPHONE (OUTPATIENT)
Dept: INTERNAL MEDICINE CLINIC | Age: 80
End: 2020-10-08

## 2020-10-08 LAB
BACTERIA UR CULT: NORMAL
BACTERIA UR CULT: NORMAL

## 2020-10-08 NOTE — TELEPHONE ENCOUNTER
----- Message from Debbie Martinez sent at 10/7/2020  6:15 PM EDT -----  Regarding: Dr Ollie Cam /elena Chowdary first and last name: Pt  Reason for call: Requesting a MRI during scheduled appt on 10/9 at 8:40am.  Dav Jarvis required yes/no and why: yes  Best contact number(s):   Details to clarify the request: N/A

## 2020-10-09 ENCOUNTER — OFFICE VISIT (OUTPATIENT)
Dept: INTERNAL MEDICINE CLINIC | Age: 80
End: 2020-10-09
Payer: MEDICARE

## 2020-10-09 VITALS
HEART RATE: 73 BPM | DIASTOLIC BLOOD PRESSURE: 60 MMHG | OXYGEN SATURATION: 96 % | TEMPERATURE: 99 F | SYSTOLIC BLOOD PRESSURE: 99 MMHG | BODY MASS INDEX: 25.7 KG/M2 | HEIGHT: 61 IN | RESPIRATION RATE: 12 BRPM

## 2020-10-09 DIAGNOSIS — I10 HYPERTENSION, UNSPECIFIED TYPE: ICD-10-CM

## 2020-10-09 DIAGNOSIS — R51.9 INTRACTABLE HEADACHE, UNSPECIFIED CHRONICITY PATTERN, UNSPECIFIED HEADACHE TYPE: Primary | ICD-10-CM

## 2020-10-09 PROCEDURE — G8432 DEP SCR NOT DOC, RNG: HCPCS | Performed by: INTERNAL MEDICINE

## 2020-10-09 PROCEDURE — 3288F FALL RISK ASSESSMENT DOCD: CPT | Performed by: INTERNAL MEDICINE

## 2020-10-09 PROCEDURE — 99214 OFFICE O/P EST MOD 30 MIN: CPT | Performed by: INTERNAL MEDICINE

## 2020-10-09 PROCEDURE — 1100F PTFALLS ASSESS-DOCD GE2>/YR: CPT | Performed by: INTERNAL MEDICINE

## 2020-10-09 PROCEDURE — 1090F PRES/ABSN URINE INCON ASSESS: CPT | Performed by: INTERNAL MEDICINE

## 2020-10-09 PROCEDURE — G8536 NO DOC ELDER MAL SCRN: HCPCS | Performed by: INTERNAL MEDICINE

## 2020-10-09 PROCEDURE — G0463 HOSPITAL OUTPT CLINIC VISIT: HCPCS | Performed by: INTERNAL MEDICINE

## 2020-10-09 PROCEDURE — G8419 CALC BMI OUT NRM PARAM NOF/U: HCPCS | Performed by: INTERNAL MEDICINE

## 2020-10-09 PROCEDURE — G8427 DOCREV CUR MEDS BY ELIG CLIN: HCPCS | Performed by: INTERNAL MEDICINE

## 2020-10-09 PROCEDURE — G8399 PT W/DXA RESULTS DOCUMENT: HCPCS | Performed by: INTERNAL MEDICINE

## 2020-10-09 NOTE — PROGRESS NOTES
Assessment and Plan   Diagnoses and all orders for this visit:    1. Intractable headache, unspecified chronicity pattern, unspecified headache type  -     MRI BRAIN WO CONT; Future  2. Hypertension, unspecified type  Patient reports that she has been having a headache for the past 6 weeks that is described as severe and sharp behind her right eye. She eventually went to the emergency department 2 weeks ago and was noted to have blood pressures in the low 200s and was started on chlorthalidone. She was seen in the emergency room again a couple days ago and was given clonidine to be used as needed for blood pressure 150/90 or greater. Her headaches have since gotten better although is still present. However, her blood pressure is now low. Her last dose of clonidine was last night. Reports some lightheadedness. Has also noted increased shortness of breath with exertion over the past few weeks. Reports that she had a CT scan and EKG done during her emergency room visits which were normal.    Unclear etiology of headaches. Shocklike symptoms sound like trigeminal neuralgia. However, patient has a history of breast cancer and she is concerned. Given new onset headaches that are persistent, will get an MRI. Recommend holding her blood pressure medications for today since she is low. Recommend monitoring over the weekend and if her blood pressure is 286 systolic or greater, she should could take 12.5 chlorthalidone. Wonder if headaches are causing elevated BP instead of the other way around. Recommend calling our office on Monday to see how her blood pressures did over the weekend and figure out next steps with her blood pressure. Consider further workup of SOB on exertion. Benefits, risks, possible drug interactions, and side effects of all new medications were reviewed with the patient. Pt verbalized understanding.     Return to clinic: Depends on how her blood pressure does over the weekend    Henry Gardner MD  [facility]  10/9/2020    Future Appointments   Date Time Provider Dorinda Jeffery   10/11/2020  9:00 AM [facility] MRI 1 [id number]   10/19/2020  2:40 PM Bill Mcbride MD [facility] BS AMB   10/20/2020  1:30 PM [facility] MAM 2 [facility]        Subjective   Chief Complaint   Headache, blood pressure    Cynthia Garcia is a 68 y.o. female         Review of Systems   Constitutional: Negative for chills and fever. Respiratory: Positive for shortness of breath. Cardiovascular: Negative for chest pain. Objective   Vitals:       Visit Vitals  BP 99/60   Pulse 73   Temp 99 °F (37.2 °C) (Oral)   Resp 12   Ht 5' 1\" (1.549 m)   SpO2 96%   BMI 25.70 kg/m²        Physical Exam  Constitutional:       Appearance: Normal appearance. She is not ill-appearing. Cardiovascular:      Rate and Rhythm: Normal rate and regular rhythm. Heart sounds: No murmur. No friction rub. No gallop. Pulmonary:      Effort: No respiratory distress. Breath sounds: No wheezing, rhonchi or rales. Abdominal:      General: Bowel sounds are normal. There is no distension. Palpations: Abdomen is soft. Tenderness: There is no abdominal tenderness. There is no guarding. Neurological:      Mental Status: She is alert. Gait: Gait normal.   Psychiatric:         Mood and Affect: Mood normal.         Thought Content: Thought content normal.         Judgment: Judgment normal.          Current Outpatient Medications   Medication Sig    cloNIDine HCL (CATAPRES) 0.2 mg tablet Take 1 Tab by mouth two (2) times daily as needed (for BP > 150/90).  chlorthalidone (HYGROTEN) 25 mg tablet Take 25 mg by mouth daily. 1 po qd    gabapentin (NEURONTIN) 300 mg capsule Take 2 caps by mouth every morning, then 2 caps by mouth every evening, then 4 caps by mouth at bedtime    linaCLOtide (Linzess) 290 mcg cap capsule Take 290 mcg by mouth as needed.     omeprazole (PRILOSEC) 20 mg capsule Take 1 Cap by mouth daily.  DULoxetine (CYMBALTA) 60 mg capsule Take 1 Cap by mouth daily.  omeprazole (PRILOSEC) 20 mg capsule TAKE ONE CAPSULE BY MOUTH DAILY    meloxicam (MOBIC) 15 mg tablet Take 1 Tab by mouth daily. (Patient taking differently: Take 15 mg by mouth daily. Indications: not taking)    zaleplon (Sonata) 10 mg capsule Take 10 mg by mouth nightly. Indications: not taking     No current facility-administered medications for this visit.

## 2020-10-09 NOTE — PATIENT INSTRUCTIONS
Do not take any blood pressure medication (chlorthalidone, clonidine) on Friday 10/9. On Saturday, take your blood pressure. If the top number is greater than 160, take half of a tablet of chlorthalidone (so you'll be taking 12.5mg). On Sunday, take your blood pressure. If the top number is greater than 160, take half of a tablet of chlorthalidone (so you'll be taking 12.5mg). Continue to do the same thing Monday and Tuesday. Call us on Monday and let us know how you're doing.

## 2020-10-09 NOTE — Clinical Note
Hi!  Saw this patient for headaches and elevated BP. It was difficult to get a straight history from her - she kept jumping around and then she and her daughter kept interrupting each other. She came in hypotensive, so I was more worried about that. Told her to hold her BP meds and monitor over the weekend and take chlorthalidone if systolic BP >057. Told them to follow up with you all on Monday to figure out next steps. I ordered an MRI for further eval of her headaches. She also then noted inc SOB with exertion x a few weeks and I decided to defer further workup - she's been in ER's and has had reported normal EKGs. Lung exam was normal.  Thanks!

## 2020-10-11 ENCOUNTER — HOSPITAL ENCOUNTER (OUTPATIENT)
Dept: MRI IMAGING | Age: 80
Discharge: HOME | End: 2020-10-11
Attending: INTERNAL MEDICINE
Payer: MEDICARE

## 2020-10-11 DIAGNOSIS — R51.9 INTRACTABLE HEADACHE, UNSPECIFIED CHRONICITY PATTERN, UNSPECIFIED HEADACHE TYPE: ICD-10-CM

## 2020-10-11 PROCEDURE — 70551 MRI BRAIN STEM W/O DYE: CPT

## 2020-10-11 NOTE — PROGRESS NOTES
Please call the pt and let her know that her MRI was normal and did not show any masses. She was also supposed to give us a call on Monday to tell us how her BP is doing and she needs close follow up with Ricardo Licona but she was booked next week and I didn't want to suggest overbooking to the patient. Recommend coordinating with Huang Hernandez  - I had sent a message to Ricardo Licona about this patient, so she might be telling Huang Loud to do something.

## 2020-10-11 NOTE — PROGRESS NOTES
Addendum   10/11/20   Please call the pt and let her know that her MRI was normal and did not show any masses.

## 2020-10-12 ENCOUNTER — TELEPHONE (OUTPATIENT)
Dept: INTERNAL MEDICINE CLINIC | Age: 80
End: 2020-10-12

## 2020-10-12 ENCOUNTER — OFFICE VISIT (OUTPATIENT)
Dept: INTERNAL MEDICINE CLINIC | Age: 80
End: 2020-10-12
Payer: MEDICARE

## 2020-10-12 VITALS
SYSTOLIC BLOOD PRESSURE: 115 MMHG | BODY MASS INDEX: 25.3 KG/M2 | HEART RATE: 80 BPM | DIASTOLIC BLOOD PRESSURE: 74 MMHG | OXYGEN SATURATION: 95 % | WEIGHT: 134 LBS | HEIGHT: 61 IN | RESPIRATION RATE: 18 BRPM

## 2020-10-12 DIAGNOSIS — R51.9 INTRACTABLE HEADACHE, UNSPECIFIED CHRONICITY PATTERN, UNSPECIFIED HEADACHE TYPE: Primary | ICD-10-CM

## 2020-10-12 DIAGNOSIS — I10 HTN, GOAL BELOW 130/80: ICD-10-CM

## 2020-10-12 DIAGNOSIS — F51.01 PRIMARY INSOMNIA: ICD-10-CM

## 2020-10-12 DIAGNOSIS — G62.9 NEUROPATHY: ICD-10-CM

## 2020-10-12 PROCEDURE — G8510 SCR DEP NEG, NO PLAN REQD: HCPCS | Performed by: INTERNAL MEDICINE

## 2020-10-12 PROCEDURE — G8536 NO DOC ELDER MAL SCRN: HCPCS | Performed by: INTERNAL MEDICINE

## 2020-10-12 PROCEDURE — 99214 OFFICE O/P EST MOD 30 MIN: CPT | Performed by: INTERNAL MEDICINE

## 2020-10-12 PROCEDURE — G0463 HOSPITAL OUTPT CLINIC VISIT: HCPCS | Performed by: INTERNAL MEDICINE

## 2020-10-12 PROCEDURE — G8427 DOCREV CUR MEDS BY ELIG CLIN: HCPCS | Performed by: INTERNAL MEDICINE

## 2020-10-12 PROCEDURE — 1100F PTFALLS ASSESS-DOCD GE2>/YR: CPT | Performed by: INTERNAL MEDICINE

## 2020-10-12 PROCEDURE — 1090F PRES/ABSN URINE INCON ASSESS: CPT | Performed by: INTERNAL MEDICINE

## 2020-10-12 PROCEDURE — G8419 CALC BMI OUT NRM PARAM NOF/U: HCPCS | Performed by: INTERNAL MEDICINE

## 2020-10-12 PROCEDURE — G8399 PT W/DXA RESULTS DOCUMENT: HCPCS | Performed by: INTERNAL MEDICINE

## 2020-10-12 PROCEDURE — 3288F FALL RISK ASSESSMENT DOCD: CPT | Performed by: INTERNAL MEDICINE

## 2020-10-12 NOTE — TELEPHONE ENCOUNTER
Patient is scheduled to see PCP today at 3:15. Made patient's daughter aware of appt. She will let her mother know. Chad Six had no further questions or concerns.

## 2020-10-12 NOTE — PATIENT INSTRUCTIONS
Gabapentin 600 mg  In am 600 mg in pm  600 mg at bedtime  For  2 weeks Then decrease to  300 mg in am  300 mg in pm  And  600 mg at bedtime Then appt  In 1 month to evaluate  Neuropathy

## 2020-10-12 NOTE — TELEPHONE ENCOUNTER
I called pts daughter, no answer. LM on VM that her mother needs to f/up with PCP regarding her MRI results. Please call the office back to r/s appt.

## 2020-10-12 NOTE — TELEPHONE ENCOUNTER
----- Message from Zehra Faye MD sent at 40/11/5233 11:58 AM EDT -----  Please call the pt and let her know that her MRI was normal and did not show any masses. She was also supposed to give us a call on Monday to tell us how her BP is doing and she needs close follow up with Agatha Xavier but she was booked next week and I didn't want to suggest overbooking to the patient. Recommend coordinating with Candelaria Gutierrez  - I had sent a message to Agatha Xavier about this patient, so she might be telling Candelaria Gutierrez to do something.

## 2020-10-13 NOTE — PROGRESS NOTES
HISTORY OF PRESENT ILLNESS  Brandy Wilson is a 68 y.o. female. HPI  Ladsai Olivarez is seen today accompanied by daughter, Vanetta Boxer, with significant concerns recently:  1. Six weeks of a throbbing headache, described as going from left to right side of head, across to ear, occasionally with a shooting sensation. No other neurologic symptoms. She has had a head CT, which was negative, an MRI, which was negative, and labs, which were unrevealing. Her headache interestingly has finally resolved. No report of rash, diplopia, slurred speech. She is concerned it is somehow related to previous history of breast cancer. 2. Labile hypertension. While in [place], pressure had spiked to as high as 263 systolic. She was there given Chlorthalidone, she has also had Clonidine prn. However, recently over the weekend her blood pressure has been as low as 90/40 and then 130-140/70. I question rebound from Clonidine. She thinks this is unlikely. Denies chest pain or shortness of breath. 3. Chronic neuropathy, currently on a total of 2,400 mg of Gabapentin daily for management. Did discuss that this is a high dose and if we are going to consider other medications to help with insomnia, it would be worth titrating down on the Gabapentin. She is in agreement and we are going to titrate down over a month to 300 mg t.i.d. with slow titration outlined. 4. Recurrent insomnia. Notes now if she goes to bed when she first feels tired, she is actually able to sleep. She has tried to work on sleep hygiene. Current Medications:  Linzess, Omeprazole, Gabapentin and Cymbalta. Review of Systems   Constitutional: Negative for chills, diaphoresis, fever, malaise/fatigue and weight loss. HENT: Negative for congestion, hearing loss, sinus pain and sore throat. Eyes: Negative for blurred vision and double vision. Respiratory: Negative for cough, shortness of breath and wheezing.     Cardiovascular: Negative for chest pain, palpitations, orthopnea, leg swelling and PND. Gastrointestinal: Negative for abdominal pain, heartburn and nausea. Musculoskeletal: Negative for myalgias. Neurological: Negative for dizziness, sensory change, speech change, focal weakness and headaches. Psychiatric/Behavioral: The patient has insomnia. Physical Exam  Vitals signs and nursing note reviewed. Constitutional:       Appearance: She is well-developed. HENT:      Head: Normocephalic and atraumatic. Right Ear: Tympanic membrane and ear canal normal.      Left Ear: Tympanic membrane and ear canal normal.   Neck:      Musculoskeletal: Normal range of motion and neck supple. Thyroid: No thyromegaly. Vascular: No carotid bruit. Cardiovascular:      Rate and Rhythm: Normal rate and regular rhythm. Heart sounds: Normal heart sounds, S1 normal and S2 normal. No murmur. Pulmonary:      Effort: Pulmonary effort is normal. No respiratory distress. Breath sounds: Normal breath sounds. No wheezing or rales. Lymphadenopathy:      Cervical: No cervical adenopathy. Neurological:      General: No focal deficit present. Mental Status: She is alert and oriented to person, place, and time. Psychiatric:         Behavior: Behavior normal.         ASSESSMENT and PLAN  Diagnoses and all orders for this visit:    1. Intractable headache, unspecified chronicity pattern, unspecified headache type  -     REFERRAL TO NEUROLOGY    2. HTN, goal below 130/80  ? Rebound from the prior clonidine bp now is on low side off meds-will see cardiology in a few weeks  3. Neuropathy-begin slow titration down on gabapentin and in 1 mo will be on 300 mg bid and 600 mg qhs and appt with  Me then  Consider change to lyrica at that time    4.  Primary insomnia  Suggested sleep specialist she declines for now will work on sleep  Hygiene  appt in [facility]

## 2020-10-19 ENCOUNTER — OFFICE VISIT (OUTPATIENT)
Dept: CARDIOLOGY CLINIC | Age: 80
End: 2020-10-19
Payer: MEDICARE

## 2020-10-19 VITALS
HEART RATE: 92 BPM | WEIGHT: 138 LBS | BODY MASS INDEX: 26.06 KG/M2 | SYSTOLIC BLOOD PRESSURE: 120 MMHG | HEIGHT: 61 IN | OXYGEN SATURATION: 96 % | DIASTOLIC BLOOD PRESSURE: 78 MMHG

## 2020-10-19 DIAGNOSIS — R07.9 CHEST PAIN, UNSPECIFIED TYPE: ICD-10-CM

## 2020-10-19 DIAGNOSIS — I10 HYPERTENSION, UNSPECIFIED TYPE: Primary | ICD-10-CM

## 2020-10-19 DIAGNOSIS — R01.1 MURMUR: ICD-10-CM

## 2020-10-19 DIAGNOSIS — Z76.89 ESTABLISHING CARE WITH NEW DOCTOR, ENCOUNTER FOR: ICD-10-CM

## 2020-10-19 DIAGNOSIS — E78.5 DYSLIPIDEMIA, GOAL LDL BELOW 100: ICD-10-CM

## 2020-10-19 DIAGNOSIS — R68.84 JAW PAIN: ICD-10-CM

## 2020-10-19 PROCEDURE — 93010 ELECTROCARDIOGRAM REPORT: CPT | Performed by: INTERNAL MEDICINE

## 2020-10-19 PROCEDURE — G0463 HOSPITAL OUTPT CLINIC VISIT: HCPCS | Performed by: INTERNAL MEDICINE

## 2020-10-19 PROCEDURE — 99204 OFFICE O/P NEW MOD 45 MIN: CPT | Performed by: INTERNAL MEDICINE

## 2020-10-19 PROCEDURE — 1090F PRES/ABSN URINE INCON ASSESS: CPT | Performed by: INTERNAL MEDICINE

## 2020-10-19 PROCEDURE — 93005 ELECTROCARDIOGRAM TRACING: CPT | Performed by: INTERNAL MEDICINE

## 2020-10-19 NOTE — PROGRESS NOTES
Reason for Consult: HTN. Referring: Tsering Light MD    HPI: Adriana Harvey is a 68 y.o. female with past medical history significant for left breast cancer status post chemotherapy and radiation, insomnia, arthritis, possible mitral valve prolapse is being referred for further evaluation. She had an episode of severe hypertension few weeks ago when she was in [place] and was traveling. She had symptoms of headache and when she went to the ER her blood pressure was significantly elevated at 692 systolic. Of note she was taking clonidine only for insomnia. There was some concern about clonidine causing rebound hypertension therefore clonidine was discontinued and she was started on chlorthalidone. About 4-5 days later she was admitted to the ER again and this time her blood pressure was low therefore even chlorthalidone was discontinued. She is returning today to see me to further evaluate. Denies any symptoms of chest pain, shortness of breath, lightheadedness or dizziness. She has symptoms of jaw pain which is off and on. There is no relationship of the jaw pain with the activities. Family history is significant for her brother having an alcohol ablation of the septal wall which tells me that he probably has hypertrophic cardiomyopathy. EKG in my office today demonstrated normal sinus rhythm, normal axis, normal intervals, normal ST segment. Records from October 5, 2020 from [place] were personally reviewed. Plan:    1. Hypertension: Episode of high blood pressure is unclear. Likely was related to clonidine however cannot improved. Continue to monitor blood pressure at home. Check echocardiogram.    2.  Jaw pain: We will further evaluate and rule out CAD. Proceed with Lexiscan stress nuclear study. 3.  Murmur: Likely this is due to aortic sclerosis however there is a possibility of hypertrophic cardiomyopathy given that her brother likely had that diagnosis.     4. See Dr. Maria R Almanza in 1 month. ATTENTION:   This medical record was transcribed using an electronic medical records/speech recognition system. Although proofread, it may and can contain electronic, spelling and other errors. Corrections may be executed at a later time. Please feel free to contact us for any clarifications as needed.       Past Medical History:   Diagnosis Date    Arthritis     Breast cancer ([address] 2015     left triple neg    Constipation     Irritable bowel syndrome (IBS)     Lymphedema     left arm    Mitral prolapse     Neurological disorder     chemotherapy induced neuropathy    Radiation therapy complication 2438    left breast ca            Past Surgical History:   Procedure Laterality Date    COLONOSCOPY N/A 5/31/2018    COLONOSCOPY performed by Juan Jose Gautam [facility] at [facility]  5/31/2018         HX BREAST BIOPSY Left yrs    neg; stereotactic    HX BREAST LUMPECTOMY Left 2015    HX BREAST REDUCTION Bilateral yrs ago    HX CHOLECYSTECTOMY      HX COLONOSCOPY      HX GYN      Hysterectomy    HX ORTHOPAEDIC Right     surgery on the ankle             Family History   Problem Relation Age of Onset    Breast Cancer Maternal Grandmother     Heart Disease Mother     Heart Disease Father            Social History     Socioeconomic History    Marital status:      Spouse name: Not on file    Number of children: Not on file    Years of education: Not on file    Highest education level: Not on file   Occupational History    Not on file   Social Needs    Financial resource strain: Not on file    Food insecurity     Worry: Not on file     Inability: Not on file    Transportation needs     Medical: Not on file     Non-medical: Not on file   Tobacco Use    Smoking status: Former Smoker    Smokeless tobacco: Never Used   Substance and Sexual Activity    Alcohol use: No    Drug use: No    Sexual activity: Yes     Partners: Male   Lifestyle  Physical activity     Days per week: Not on file     Minutes per session: Not on file    Stress: Not on file   Relationships    Social connections     Talks on phone: Not on file     Gets together: Not on file     Attends Scientologist service: Not on file     Active member of club or organization: Not on file     Attends meetings of clubs or organizations: Not on file     Relationship status: Not on file    Intimate partner violence     Fear of current or ex partner: Not on file     Emotionally abused: Not on file     Physically abused: Not on file     Forced sexual activity: Not on file   Other Topics Concern    Not on file   Social History Narrative    Not on file         No Known Allergies         Current Outpatient Medications   Medication Sig Dispense Refill    gabapentin (NEURONTIN) 300 mg capsule Take 2 caps by mouth every morning, then 2 caps by mouth every evening, then 4 caps by mouth at bedtime 240 Cap 0    linaCLOtide (Linzess) 290 mcg cap capsule Take 290 mcg by mouth as needed.  omeprazole (PRILOSEC) 20 mg capsule Take 1 Cap by mouth daily. 30 Cap 5    DULoxetine (CYMBALTA) 60 mg capsule Take 1 Cap by mouth daily. 90 Cap 1        ROS:  12 point review of systems was performed. All negative except for HPI     Physical Exam:  Visit Vitals  /78 (BP 1 Location: Left arm, BP Patient Position: Sitting)   Pulse 92   Ht 5' 1\" (1.549 m)   Wt 138 lb (62.6 kg)   SpO2 96%   BMI 26.07 kg/m²       Gen:  Well-developed, well-nourished, in no acute distress  HEENT:  Pink conjunctivae, PERRL, hearing intact to voice, moist mucous membranes  Neck:  Supple, without masses, thyroid non-tender  Resp:  No accessory muscle use, clear breath sounds without wheezes rales or rhonchi  Card:  ESM grade 3/6 in aortic region. Normal S1, S2 without thrills, bruits.  Trace peripheral edema  Abd:  Soft, non-tender, non-distended, normoactive bowel sounds are present, no palpable organomegaly and no detectable hernias  Lymph:  No cervical or inguinal adenopathy  Musc:  No cyanosis or clubbing  Skin:  No rashes or ulcers, skin turgor is good  Neuro:  Cranial nerves are grossly intact, no focal motor weakness, follows commands appropriately  Psych:  Good insight, oriented to person, place and time, alert     Labs:     Lab Results   Component Value Date/Time    WBC 5.2 11/16/2019 08:21 PM    HGB 12.1 11/16/2019 08:21 PM    HCT 37.2 11/16/2019 08:21 PM    PLATELET 463 35/53/3090 08:21 PM    MCV 92.1 11/16/2019 08:21 PM     Lab Results   Component Value Date/Time    Glucose 107 (H) 11/16/2019 08:21 PM    LDL, calculated 137 (H) 11/21/2017 10:06 AM    Creatinine (POC) 0.8 05/07/2018 05:36 PM    Creatinine 1.18 (H) 11/16/2019 08:21 PM      Lab Results   Component Value Date/Time    Cholesterol, total 239 (H) 11/21/2017 10:06 AM    HDL Cholesterol 74 11/21/2017 10:06 AM    LDL, calculated 137 (H) 11/21/2017 10:06 AM    Triglyceride 141 11/21/2017 10:06 AM     Lab Results   Component Value Date/Time    ALT (SGPT) 59 11/16/2019 08:21 PM    Alk.  phosphatase 67 11/16/2019 08:21 PM    Bilirubin, direct 0.1 11/16/2019 08:21 PM    Bilirubin, total 0.3 11/16/2019 08:21 PM    Albumin 3.9 11/16/2019 08:21 PM    Protein, total 7.5 11/16/2019 08:21 PM    PLATELET 599 58/11/5280 08:21 PM     No results found for: INR, INREXT, PTMR, PTP, PT1, PT2, INREXT   Lab Results   Component Value Date/Time    GFR est non-AA 44 (L) 11/16/2019 08:21 PM    GFRNA, POC >60 05/07/2018 05:36 PM    GFR est AA 54 (L) 11/16/2019 08:21 PM    GFRAA, POC >60 05/07/2018 05:36 PM    Creatinine 1.18 (H) 11/16/2019 08:21 PM    Creatinine (POC) 0.8 05/07/2018 05:36 PM    BUN 26 (H) 11/16/2019 08:21 PM    Sodium 133 (L) 11/16/2019 08:21 PM    Potassium 4.1 11/16/2019 08:21 PM    Chloride 100 11/16/2019 08:21 PM    CO2 25 11/16/2019 08:21 PM     No results found for: PSA, Verlinda Rumsey, [id number], [id number], PSALT  Lab Results   Component Value Date/Time    TSH 1.110 06/21/2019 08:38 AM    T3 Uptake 27 11/12/2018 03:47 PM    T4, Free 1.27 11/21/2017 10:06 AM    T4, Total 4.8 11/12/2018 03:47 PM      Lab Results   Component Value Date/Time    Glucose 107 (H) 11/16/2019 08:21 PM      No results found for: CPK, RCK1, RCK2, RCK3, RCK4, CKMB, CKNDX, CKND1, TROPT, TROIQ, BNPP, BNP   No results found for: BNP, BNPP, BNPPPOC, XBNPT, BNPNT   Lab Results   Component Value Date/Time    Sodium 133 (L) 11/16/2019 08:21 PM    Potassium 4.1 11/16/2019 08:21 PM    Chloride 100 11/16/2019 08:21 PM    CO2 25 11/16/2019 08:21 PM    Anion gap 8 11/16/2019 08:21 PM    Glucose 107 (H) 11/16/2019 08:21 PM    BUN 26 (H) 11/16/2019 08:21 PM    Creatinine 1.18 (H) 11/16/2019 08:21 PM    BUN/Creatinine ratio 22 (H) 11/16/2019 08:21 PM    GFR est AA 54 (L) 11/16/2019 08:21 PM    GFR est non-AA 44 (L) 11/16/2019 08:21 PM    Calcium 9.4 11/16/2019 08:21 PM      Lab Results   Component Value Date/Time    Sodium 133 (L) 11/16/2019 08:21 PM    Potassium 4.1 11/16/2019 08:21 PM    Chloride 100 11/16/2019 08:21 PM    CO2 25 11/16/2019 08:21 PM    Anion gap 8 11/16/2019 08:21 PM    Glucose 107 (H) 11/16/2019 08:21 PM    BUN 26 (H) 11/16/2019 08:21 PM    Creatinine 1.18 (H) 11/16/2019 08:21 PM    BUN/Creatinine ratio 22 (H) 11/16/2019 08:21 PM    GFR est AA 54 (L) 11/16/2019 08:21 PM    GFR est non-AA 44 (L) 11/16/2019 08:21 PM    Calcium 9.4 11/16/2019 08:21 PM    Bilirubin, total 0.3 11/16/2019 08:21 PM    ALT (SGPT) 59 11/16/2019 08:21 PM    Alk. phosphatase 67 11/16/2019 08:21 PM    Protein, total 7.5 11/16/2019 08:21 PM    Albumin 3.9 11/16/2019 08:21 PM    Globulin 3.6 11/16/2019 08:21 PM    A-G Ratio 1.1 11/16/2019 08:21 PM      No results found for: HBA1C, FBI3XSFJ, HGBE8, GYF1MIPQ, FAU6DQDV      No results for input(s): CPK, CKMB, TROIQ in the last 72 hours.     No lab exists for component: CKQMB, CPKMB        Problem List:     Problem List  Date Reviewed: 10/12/2020          Codes Class Noted    Malignant neoplasm of left female breast [facility]) ICD-10-CM: X36.058  ICD-9-CM: 174.9  5/9/2017        Dyslipidemia, goal LDL below 100 ICD-10-CM: E78.5  ICD-9-CM: 272.4  5/9/2017                Bharathi Gorman MD, [facility] [place]

## 2020-10-19 NOTE — PROGRESS NOTES
Fareed Shore is a 68 y.o. female    Visit Vitals  /78 (BP 1 Location: Left arm, BP Patient Position: Sitting)   Pulse 92   Ht 5' 1\" (1.549 m)   Wt 138 lb (62.6 kg)   SpO2 96%   BMI 26.07 kg/m²       Chief Complaint   Patient presents with    Other     establish care       Chest pain no  SOB yes occastionally    Dizziness yes occasionally    Swelling no  Recent hospital visit no  Refills no

## 2020-10-19 NOTE — PROGRESS NOTES
All orders entered per verbal orders by Dr. Dale Viera study- Chest pain. Echo- Murmur, HTN. See Dr. Clyde Garcia in 1 month.

## 2020-10-20 ENCOUNTER — HOSPITAL ENCOUNTER (OUTPATIENT)
Dept: MAMMOGRAPHY | Age: 80
Discharge: HOME | End: 2020-10-20
Attending: INTERNAL MEDICINE
Payer: MEDICARE

## 2020-10-20 ENCOUNTER — TRANSCRIBE ORDER (OUTPATIENT)
Dept: MAMMOGRAPHY | Age: 80
End: 2020-10-20

## 2020-10-20 DIAGNOSIS — Z85.3 HX OF BREAST CANCER: Primary | ICD-10-CM

## 2020-10-20 DIAGNOSIS — Z85.3 HX OF BREAST CANCER: ICD-10-CM

## 2020-10-20 PROCEDURE — 77062 BREAST TOMOSYNTHESIS BI: CPT

## 2020-10-21 ENCOUNTER — OFFICE VISIT (OUTPATIENT)
Dept: NEUROLOGY | Age: 80
End: 2020-10-21
Payer: MEDICARE

## 2020-10-21 VITALS
TEMPERATURE: 97.3 F | HEIGHT: 61 IN | BODY MASS INDEX: 26.06 KG/M2 | WEIGHT: 138 LBS | HEART RATE: 94 BPM | OXYGEN SATURATION: 98 % | DIASTOLIC BLOOD PRESSURE: 70 MMHG | SYSTOLIC BLOOD PRESSURE: 112 MMHG

## 2020-10-21 DIAGNOSIS — R51.9 NEW ONSET HEADACHE: Primary | ICD-10-CM

## 2020-10-21 DIAGNOSIS — G62.0 CHEMOTHERAPY-INDUCED PERIPHERAL NEUROPATHY (HCC): ICD-10-CM

## 2020-10-21 DIAGNOSIS — F51.04 PSYCHOPHYSIOLOGICAL INSOMNIA: ICD-10-CM

## 2020-10-21 DIAGNOSIS — T45.1X5A CHEMOTHERAPY-INDUCED PERIPHERAL NEUROPATHY (HCC): ICD-10-CM

## 2020-10-21 PROCEDURE — 3288F FALL RISK ASSESSMENT DOCD: CPT | Performed by: NURSE PRACTITIONER

## 2020-10-21 PROCEDURE — G8419 CALC BMI OUT NRM PARAM NOF/U: HCPCS | Performed by: NURSE PRACTITIONER

## 2020-10-21 PROCEDURE — 99204 OFFICE O/P NEW MOD 45 MIN: CPT | Performed by: NURSE PRACTITIONER

## 2020-10-21 PROCEDURE — 1100F PTFALLS ASSESS-DOCD GE2>/YR: CPT | Performed by: NURSE PRACTITIONER

## 2020-10-21 PROCEDURE — 1090F PRES/ABSN URINE INCON ASSESS: CPT | Performed by: NURSE PRACTITIONER

## 2020-10-21 PROCEDURE — G8432 DEP SCR NOT DOC, RNG: HCPCS | Performed by: NURSE PRACTITIONER

## 2020-10-21 PROCEDURE — G8536 NO DOC ELDER MAL SCRN: HCPCS | Performed by: NURSE PRACTITIONER

## 2020-10-21 PROCEDURE — G8427 DOCREV CUR MEDS BY ELIG CLIN: HCPCS | Performed by: NURSE PRACTITIONER

## 2020-10-21 PROCEDURE — G8399 PT W/DXA RESULTS DOCUMENT: HCPCS | Performed by: NURSE PRACTITIONER

## 2020-10-21 NOTE — PROGRESS NOTES
[address] [facility],5Th Floor  [address]   [place] [address] [facility] [address] [place] [address]   [phone] Office   [phone] Fax           Date:  10/21/20     Name:  Darnell Shearer  :  1940  MRN:  [id number]     PCP:  Tsering Light MD    Chief Complaint   Patient presents with    Headache       HISTORY OF PRESENT ILLNESS: This is a 70yo, right handed, woman who presents today for further evaluation of headaches. About 2 months ago, she developed a daily persistent headache that was located in the right side of her head. It is described as a stabbing/throbbing sensation. It was associated with right retro-orbital pain and mild blurred vision in the right eye. This lasted about six weeks and resolved about a week and a half ago. [place] normal CT of the head. MRI of the brain was performed  which was normal.  At about the time that she started developing the headache, she did experience a significant spike in her blood pressure from her normal of the 770N-341F systolic to as much as 309 systolic. As her blood pressure has come down, it does seem as though the headaches have improved as well. She does have a PMH of breast cancer and had some concerns that this might be related but there is no evidence of any metastatic process to the brain. 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 She is currently being weaned off of the gabapentin for this and has not noticed any significant change in the abnormal sensations. Overall, they are not really that bothersome at this point. She indicates that part of the impetus behind stopping the gabapentin was difficulty sleeping.   She was using Sonata at night to help her sleep as she notes that when she falls asleep she is able to sleep well. She has had a sleep study in the past and while she does not sleep long, only about 5 hours, she was told that she sleeps well during that timeframe. She does mention that she oftentimes stay up until everything that is on her med list of things to do is done. If that takes her into 3 AM the med takes her until 3 AM.  She also tends to have her mind constantly going. If she has nothing to do that is on her own list of things to do that she starts worrying about what might be going on with her family which she can do for them. She also mentions today that she may have some intermittent tingling in her fingertips as well. Except as noted above, denies  fever, chills, cough. No CP or SOB. No dysuria, loss of bowel or bladder control. No Weight loss. Appetite good. Sleeping well. No sweats. No edema. No bruising or bleeding. No nausea or vomit. No diarrhea. No frequency, urgency, No depressive sxs. No anxiety. Denies sore throat, nasal congestion, nasal discharge, epistaxis, tinnitus, hearing loss, back pain, muscle pain, or joint pain. Current Outpatient Medications   Medication Sig    gabapentin (NEURONTIN) 300 mg capsule Take 2 caps by mouth every morning, then 2 caps by mouth every evening, then 4 caps by mouth at bedtime    linaCLOtide (Linzess) 290 mcg cap capsule Take 290 mcg by mouth as needed.  omeprazole (PRILOSEC) 20 mg capsule Take 1 Cap by mouth daily.  DULoxetine (CYMBALTA) 60 mg capsule Take 1 Cap by mouth daily. No current facility-administered medications for this visit.       No Known Allergies  Past Medical History:   Diagnosis Date    Arthritis     Breast cancer ([facility] [address] 2015     left triple neg    Constipation     Irritable bowel syndrome (IBS)     Lymphedema     left arm    Mitral prolapse     Neurological disorder     chemotherapy induced neuropathy    Radiation therapy complication 4411    left breast ca     Past Surgical History: Procedure Laterality Date    COLONOSCOPY N/A 5/31/2018    COLONOSCOPY performed by Aishwarya Vásquez MD at [address]  5/31/2018         HX BREAST BIOPSY Left yrs    neg; stereotactic    HX BREAST LUMPECTOMY Left 2015    HX BREAST REDUCTION Bilateral yrs ago    HX CHOLECYSTECTOMY      HX COLONOSCOPY      HX GYN      Hysterectomy    HX ORTHOPAEDIC Right     surgery on the ankle     Social History     Socioeconomic History    Marital status:      Spouse name: Not on file    Number of children: Not on file    Years of education: Not on file    Highest education level: Not on file   Occupational History    Not on file   Social Needs    Financial resource strain: Not on file    Food insecurity     Worry: Not on file     Inability: Not on file    Transportation needs     Medical: Not on file     Non-medical: Not on file   Tobacco Use    Smoking status: Former Smoker    Smokeless tobacco: Never Used   Substance and Sexual Activity    Alcohol use: No    Drug use: No    Sexual activity: Yes     Partners: Male   Lifestyle    Physical activity     Days per week: Not on file     Minutes per session: Not on file    Stress: Not on file   Relationships    Social connections     Talks on phone: Not on file     Gets together: Not on file     Attends Protestant service: Not on file     Active member of club or organization: Not on file     Attends meetings of clubs or organizations: Not on file     Relationship status: Not on file    Intimate partner violence     Fear of current or ex partner: Not on file     Emotionally abused: Not on file     Physically abused: Not on file     Forced sexual activity: Not on file   Other Topics Concern    Not on file   Social History Narrative    Not on file     Family History   Problem Relation Age of Onset    Breast Cancer Maternal Grandmother     Heart Disease Mother     Heart Disease Father        PHYSICAL EXAMINATION:    Visit Vitals  /70 (BP 1 Location: Right arm, BP Patient Position: Sitting)   Pulse 94   Temp 97.3 °F (36.3 °C)   Ht 5' 1\" (1.549 m)   Wt 62.6 kg (138 lb)   SpO2 98%   BMI 26.07 kg/m²     General:  Well defined, nourished, and groomed individual in no acute distress. Neck: Supple, nontender, no bruits, no pain with resistance to active range of motion. Heart: Regular rate and rhythm, no murmurs, rub, or gallop. Normal S1S2. Lungs:  Clear to auscultation bilaterally with equal chest expansion, no cough, no wheeze  Musculoskeletal:  Extremities revealed no edema and had full range of motion of joints. Psych:  Good mood and bright affect    NEUROLOGICAL EXAMINATION:     Mental Status:   Alert and oriented to person, place, and time with recent and remote memory intact. Attention span and concentration are normal. Speech is fluent with a full fund of knowledge. Cranial Nerves:  I: smell Not tested   II: visual fields Full to confrontation   II: pupils Equal, round, reactive to light   II: optic disc No papilledema   III,VII: ptosis none   III,IV,VI: extraocular muscles  Full ROM   V: mastication normal   V: facial light touch sensation  normal   VII: facial muscle function   symmetric   VIII: hearing symmetric   IX: soft palate elevation  normal   XI: trapezius strength  5/5   XI: sternocleidomastoid strength 5/5   XI: neck flexion strength  5/5   XII: tongue  midline     Motor Examination: Normal tone, bulk, and strength, 5/5 muscle strength throughout. No cogwheel rigidity or clonus present. Sensory exam:  Normal throughout to pinprick, temperature, and vibration sense. Normal proprioception. Coordination:  Heel-to-shin was smooth and symmetrical bilaterally. Finger to nose and rapid arm movement testing was normal.   No resting or intention tremor    Gait and Station:  Steady while walking on toes, heels, and with tandem walking. Normal arm swing. No Rhomberg or pronator drift.    No muscle wasting or fasiculations noted. Reflexes:  DTRs 2+ throughout. Toes downgoing. ASSESSMENT AND PLAN    ICD-10-CM ICD-9-CM    1. New onset headache  R51.9 784.0    2. Chemotherapy-induced peripheral neuropathy (HCC)  G62.0 357.7     T45. 1X5A E933.1    3. Psychophysiological insomnia  F51.04 307.42      New onset headache in a 81-year-old lady with a history of breast cancer. While the headache did last about 6 weeks, this is now resolved. I suspect that it was a vascular headache secondary to elevation in her blood pressure. As it is resolved at this point, she has had normal imaging of her brain, and a normal exam as well as not normally being a headache lady, I would simply take a watch and wait approach to this at this point. With regard to her chemotherapy-induced peripheral neuropathy, this is not bothering her significantly even with reduction in the dosage of her gabapentin. If she does start the gabapentin and the neuropathy is still not bothersome, I would not pursue additional therapy at that point. We did discuss her issues with insomnia. We discussed sleep hygiene which includes going to bed at the same time and waking up at the same time every day, limiting caffeine use, and not lingering in bed if she is unable to sleep after about 45 minutes to 1 hour. Recommended that she actually get up if that is occurring, go do a nonstimulating activities such as reading or watching television that is not stimulating until she is sleepy and then going back to bed. She should not participate in any activities while in bed other than sleep and sex. Some of her issues with insomnia do actually sound more psychogenic in that she tends to either stay up until everything that she wants to do is done even if that takes her until 3 AM or worrying about what needs to be done whether it something that is directly related to an activity she needs to accomplish for somebody else.   We discussed potentially trying to compartmentalize what is and is not important in terms of immediacy. Certainly if it is an issue that is related to activity for which someone else is actually responsible, then she really should not allow herself to worry about it. At this point, neurologically she does seem to be very stable. I am happy to help with any issues that she may have in the future. For now, I will plan to see her back if needed. Deepthi Hall

## 2020-10-21 NOTE — LETTER
10/21/20 Patient: Froylan Haro YOB: 1940 Date of Visit: 10/21/2020 Jhoana Crain MD 
[address] [facility] [address] [address] VIA In Basket Dear Jhoana Crain MD, Thank you for referring Ms. Hipolito Moreira to [facility] for evaluation. My notes for this consultation are attached. If you have questions, please do not hesitate to call me. I look forward to following your patient along with you.  
 
 
Sincerely, 
 
Justin Schneider NP

## 2020-10-21 NOTE — PROGRESS NOTES
Accompanied by daughter. Headaches for about 6 weeks referred by PCP. MRI done in [address] [place] [address]. Psychiatric Evaluation - Behavioral Health   Nida Zaragoza 45 y o  male MRN: [id number]  Unit/Bed#: [facility] 258-02 Encounter: [id number]    Assessment/Plan   Active Problems:    Severe bipolar I disorder, most recent episode depressed without psychotic features ([facility] [address] )    Tobacco use disorder    Essential hypertension    DM (diabetes mellitus), secondary uncontrolled ([facility] )    Plan:   Risks, benefits and possible side effects of Medications:   Risks, benefits, and possible side effects of medications explained to patient and patient verbalizes understanding  1  Admit to acute psychiatric given of care for safety and stabilization  2  Safety checks use of a minutes  3  History and physical exam and warranted laboratory workup  4  Individual, family, group and milieu therapy    Chief Complaint: "I wanted to die"    History of Present Illness     Patient is a 45 y o  male with long history of bipolar illness who is currently homeless and has history wondering in the wilderness  He has history of medication noncompliance and episodes of depression and psychosis  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  Patient reports that he has not been taking his medication recently her starting getting depressed and feeling suicidal   Patient had a plan to overdose on some of his medications  Patient reports gaining weight lately and has been struggling with very low energy and poor concentration          Psychiatric Review Of Systems:  sleep: yes  appetite changes: yes  weight changes: yes  energy/anergy: yes  interest/pleasure/anhedonia: no  somatic symptoms: yes  anxiety/panic: no  zoran: no  guilty/hopeless: yes  self injurious behavior/risky behavior: no    Historical Information     Past Psychiatric History:   Therapy, Out Patient non compliant    Substance Abuse History:  Social History     Tobacco History     Smoking Status  Current Every Day Smoker Smoking Frequency  1 5 packs/day for 22 years (35 pk yrs) Smoking Tobacco Type  Cigarettes    Smokeless Tobacco Use  Current User Smokeless Tobacco Type  Chew          Alcohol History     Alcohol Use Status  No          Drug Use     Drug Use Status  Yes Types  Marijuana Comment  monthly          Sexual Activity     Sexually Active  Never          Activities of Daily Living    Not Asked               Additional Substance Use Detail     Questions Responses    Substance Use Assessment Substance use within the past 12 months    Alcohol Use Frequency Experimented    Cannabis frequency 3 or more times/week    Comment: 3 or more times/week on 7/11/2018     Heroin Frequency Denies use in past 12 months    Cannabis method Smoke    Comment: Smoke on 7/11/2018     Cocaine frequency Never used    Comment: Never used on 7/11/2018     Crack Cocaine Frequency Denies use in past 12 months    Methamphetamine Frequency Denies use in past 12 months    Narcotic Frequency Denies use in past 12 months    Benzodiazepine Frequency Denies use in past 12 months    Amphetamine frequency Denies use in past 12 months    Barbituate Frequency Denies use use in past 12 months    Inhalant frequency Never used    Comment: Never used on 7/11/2018     Hallucinogen frequency Never used    Comment: Never used on 7/11/2018     Ecstasy frequency Never used    Comment: Never used on 7/11/2018     Other drug frequency Never used    Comment: Never used on 7/11/2018     Opiate frequency Denies use in past 12 months    Last reviewed by Agapito Cohn on 7/9/2019        I have assessed this patient for substance use within the past 12 months    Family Psychiatric History:   Psychiatric Illness Mother  Illness: depression    Social History:  Education: high school diploma/GED      Past Medical History:   Diagnosis Date    Anxiety     Bipolar 1 disorder ([facility] [address] )     Chronic pain disorder     Depression     Diabetes mellitus ([facility] )     Hypertension     Psychiatric illness     PTSD (post-traumatic stress disorder)     Sleep difficulties     Substance abuse ([facility] [address] )     Suicide attempt [facility])        Medical Review Of Systems:  Pertinent items are noted in HPI  Meds/Allergies   all current active meds have been reviewed  Allergies   Allergen Reactions    Penicillins Rash and Other (See Comments)     rash (Tolerating Ancef-per RN)  Last noted when patient was a child       Objective   Vital signs in last 24 hours:  Temp:  [97 5 °F (36 4 °C)-98 4 °F (36 9 °C)] 97 5 °F (36 4 °C)  HR:  [75-90] 80  Resp:  [16-20] 16  BP: (116-162)/(64-93) 162/93    No intake or output data in the 24 hours ending 07/10/19 1453    Mental Status Evaluation:  Appearance:  discheveled   Behavior:  normal   Speech:  soft   Mood:  constricted   Affect:  constricted   Language: repeating phrases   Thought Process:  circumstantial   Thought Content:  obsessions   Perceptual Disturbances: None   Risk Potential: Suicidal Ideations with plan to OD   Sensorium:  person and place   Cognition:  grossly intact   Consciousness:  alert and awake    Attention: attention span appeared shorter than expected for age   Intellect: not examined   Fund of Knowledge: vocabulary: limited   Insight:  limited   Judgment: limited   Muscle Strength and Tone: face and neck   Gait/Station: slow   Motor Activity: no abnormal movements     Lab Results: I have personally reviewed pertinent lab results  Patient Strengths/Assets: interpersonal skills    Patient Barriers/Limitations: homeless    Counseling / Coordination of Care  Total floor / unit time spent today 60 minutes  Greater than 50% of total time was spent with the patient and / or family counseling and / or coordination of care   A description of the counseling / coordination of care:

## 2020-10-30 NOTE — PROGRESS NOTES
Phone call during covid crisis-could not get doxy to work as in [address]  with little service.   Notes bp was high 170/98 and went to local urgent care and started on chlorthalidone and  Tolerates it so far but notes headache over right eye persistent -no other neuro sxs  Advised urgent eval locally in [place]-will go to er for stat evaluation  Time on phone 12 min

## 2020-11-06 ENCOUNTER — TELEPHONE (OUTPATIENT)
Dept: INTERNAL MEDICINE CLINIC | Age: 80
End: 2020-11-06

## 2020-11-06 ENCOUNTER — ANCILLARY PROCEDURE (OUTPATIENT)
Dept: CARDIOLOGY CLINIC | Age: 80
End: 2020-11-06
Payer: MEDICARE

## 2020-11-06 VITALS
WEIGHT: 138 LBS | HEIGHT: 61 IN | SYSTOLIC BLOOD PRESSURE: 118 MMHG | DIASTOLIC BLOOD PRESSURE: 70 MMHG | BODY MASS INDEX: 26.06 KG/M2

## 2020-11-06 DIAGNOSIS — G62.9 NEUROPATHY: Primary | ICD-10-CM

## 2020-11-06 DIAGNOSIS — G62.9 NEUROPATHY: ICD-10-CM

## 2020-11-06 DIAGNOSIS — I10 HYPERTENSION, UNSPECIFIED TYPE: ICD-10-CM

## 2020-11-06 DIAGNOSIS — R07.9 CHEST PAIN, UNSPECIFIED TYPE: ICD-10-CM

## 2020-11-06 DIAGNOSIS — R01.1 MURMUR: ICD-10-CM

## 2020-11-06 PROCEDURE — 93016 CV STRESS TEST SUPVJ ONLY: CPT | Performed by: INTERNAL MEDICINE

## 2020-11-06 PROCEDURE — 78452 HT MUSCLE IMAGE SPECT MULT: CPT | Performed by: INTERNAL MEDICINE

## 2020-11-06 PROCEDURE — 93306 TTE W/DOPPLER COMPLETE: CPT | Performed by: INTERNAL MEDICINE

## 2020-11-06 PROCEDURE — A9500 TC99M SESTAMIBI: HCPCS | Performed by: INTERNAL MEDICINE

## 2020-11-06 PROCEDURE — 93018 CV STRESS TEST I&R ONLY: CPT | Performed by: INTERNAL MEDICINE

## 2020-11-06 RX ORDER — GABAPENTIN 300 MG/1
CAPSULE ORAL
Qty: 28 CAP | Refills: 0 | Status: SHIPPED | OUTPATIENT
Start: 2020-11-06 | End: 2020-11-12 | Stop reason: DRUGHIGH

## 2020-11-06 RX ORDER — TETRAKIS(2-METHOXYISOBUTYLISOCYANIDE)COPPER(I) TETRAFLUOROBORATE 1 MG/ML
8.4 INJECTION, POWDER, LYOPHILIZED, FOR SOLUTION INTRAVENOUS ONCE
Status: COMPLETED | OUTPATIENT
Start: 2020-11-06 | End: 2020-11-06

## 2020-11-06 RX ORDER — TETRAKIS(2-METHOXYISOBUTYLISOCYANIDE)COPPER(I) TETRAFLUOROBORATE 1 MG/ML
24.3 INJECTION, POWDER, LYOPHILIZED, FOR SOLUTION INTRAVENOUS ONCE
Status: COMPLETED | OUTPATIENT
Start: 2020-11-06 | End: 2020-11-06

## 2020-11-06 RX ORDER — GABAPENTIN 300 MG/1
CAPSULE ORAL
Qty: 28 CAP | Refills: 0 | Status: SHIPPED | OUTPATIENT
Start: 2020-11-06 | End: 2020-11-06 | Stop reason: SDUPTHER

## 2020-11-06 RX ADMIN — TETRAKIS(2-METHOXYISOBUTYLISOCYANIDE)COPPER(I) TETRAFLUOROBORATE 8.4 MILLICURIE: 1 INJECTION, POWDER, LYOPHILIZED, FOR SOLUTION INTRAVENOUS at 10:05

## 2020-11-06 RX ADMIN — TECHNETIUM TC 99M SESTAMIBI 24.3 MILLICURIE: 1 INJECTION INTRAVENOUS at 10:45

## 2020-11-06 RX ADMIN — REGADENOSON 0.4 MG: 0.08 INJECTION, SOLUTION INTRAVENOUS at 10:51

## 2020-11-06 NOTE — TELEPHONE ENCOUNTER
Patient's  called requesting enough medication to get patient to scheduled appointment with PCP.  reports patient is almost out of medication.  request refill today if able. Please call to advise.  [phone]

## 2020-11-10 LAB
ECHO AO ROOT DIAM: 3 CM
ECHO AV AREA PEAK VELOCITY: 2.31 CM2
ECHO AV AREA VTI: 2.4 CM2
ECHO AV AREA/BSA PEAK VELOCITY: 1.4 CM2/M2
ECHO AV AREA/BSA VTI: 1.5 CM2/M2
ECHO AV MEAN GRADIENT: 4.85 MMHG
ECHO AV PEAK GRADIENT: 7.74 MMHG
ECHO AV PEAK VELOCITY: 139.13 CM/S
ECHO AV VTI: 32.08 CM
ECHO EST RA PRESSURE: 3 MMHG
ECHO LA AREA 4C: 13.41 CM2
ECHO LA MAJOR AXIS: 2.97 CM
ECHO LA MINOR AXIS: 1.84 CM
ECHO LA VOL 2C: 44.13 ML (ref 22–52)
ECHO LA VOL 4C: 29.56 ML (ref 22–52)
ECHO LA VOL BP: 41.38 ML (ref 22–52)
ECHO LA VOL/BSA BIPLANE: 25.65 ML/M2 (ref 16–28)
ECHO LA VOLUME INDEX A2C: 27.35 ML/M2 (ref 16–28)
ECHO LA VOLUME INDEX A4C: 18.32 ML/M2 (ref 16–28)
ECHO LV E' LATERAL VELOCITY: 5.25 CM/S
ECHO LV E' SEPTAL VELOCITY: 3.8 CM/S
ECHO LV EDV A2C: 59.78 ML
ECHO LV EDV A4C: 69.04 ML
ECHO LV EDV BP: 65.76 ML (ref 56–104)
ECHO LV EDV INDEX A4C: 42.8 ML/M2
ECHO LV EDV INDEX BP: 40.8 ML/M2
ECHO LV EDV NDEX A2C: 37.1 ML/M2
ECHO LV EJECTION FRACTION A2C: 60 PERCENT
ECHO LV EJECTION FRACTION A4C: 64 PERCENT
ECHO LV EJECTION FRACTION BIPLANE: 61.7 PERCENT (ref 55–100)
ECHO LV ESV A2C: 24.07 ML
ECHO LV ESV A4C: 24.95 ML
ECHO LV ESV BP: 25.18 ML (ref 19–49)
ECHO LV ESV INDEX A2C: 14.9 ML/M2
ECHO LV ESV INDEX A4C: 15.5 ML/M2
ECHO LV ESV INDEX BP: 15.6 ML/M2
ECHO LV INTERNAL DIMENSION DIASTOLIC: 3.72 CM (ref 3.9–5.3)
ECHO LV INTERNAL DIMENSION SYSTOLIC: 2.46 CM
ECHO LV IVSD: 1.25 CM (ref 0.6–0.9)
ECHO LV MASS 2D: 141.1 G (ref 67–162)
ECHO LV MASS INDEX 2D: 87.5 G/M2 (ref 43–95)
ECHO LV POSTERIOR WALL DIASTOLIC: 1.07 CM (ref 0.6–0.9)
ECHO LVOT DIAM: 1.97 CM
ECHO LVOT PEAK GRADIENT: 4.47 MMHG
ECHO LVOT PEAK VELOCITY: 105.67 CM/S
ECHO LVOT SV: 77 ML
ECHO LVOT VTI: 25.34 CM
ECHO MV A VELOCITY: 131.02 CM/S
ECHO MV E DECELERATION TIME (DT): 266.2 MS
ECHO MV E VELOCITY: 99.23 CM/S
ECHO MV E/A RATIO: 0.76
ECHO MV E/E' LATERAL: 18.9
ECHO MV E/E' RATIO (AVERAGED): 22.51
ECHO MV E/E' SEPTAL: 26.11
ECHO MV PRESSURE HALF TIME (PHT): 77.2 MS
ECHO RA AREA 4C: 8.31 CM2
ECHO RIGHT VENTRICULAR SYSTOLIC PRESSURE (RVSP): 26.11 MMHG
ECHO RV INTERNAL DIMENSION: 2.88 CM
ECHO RV TAPSE: 2.02 CM (ref 1.5–2)
ECHO TV REGURGITANT MAX VELOCITY: 240.39 CM/S
ECHO TV REGURGITANT PEAK GRADIENT: 23.11 MMHG
LA VOL DISK BP: 37.05 ML (ref 22–52)
STRESS BASELINE DIAS BP: 94 MMHG
STRESS BASELINE HR: 73 BPM
STRESS BASELINE SYS BP: 158 MMHG
STRESS O2 SAT PEAK: 99 %
STRESS O2 SAT REST: 100 %
STRESS PEAK DIAS BP: 88 MMHG
STRESS PEAK SYS BP: 166 MMHG
STRESS PERCENT HR ACHIEVED: 70 %
STRESS POST PEAK HR: 100 BPM
STRESS RATE PRESSURE PRODUCT: NORMAL BPM*MMHG
STRESS ST DEPRESSION: 0 MM
STRESS ST ELEVATION: 0 MM
STRESS TARGET HR: 142 BPM

## 2020-11-12 ENCOUNTER — OFFICE VISIT (OUTPATIENT)
Dept: INTERNAL MEDICINE CLINIC | Age: 80
End: 2020-11-12
Payer: MEDICARE

## 2020-11-12 VITALS
DIASTOLIC BLOOD PRESSURE: 78 MMHG | HEIGHT: 61 IN | WEIGHT: 137 LBS | BODY MASS INDEX: 25.86 KG/M2 | HEART RATE: 85 BPM | SYSTOLIC BLOOD PRESSURE: 119 MMHG | OXYGEN SATURATION: 96 % | RESPIRATION RATE: 14 BRPM | TEMPERATURE: 98.5 F

## 2020-11-12 DIAGNOSIS — I51.7 LVH (LEFT VENTRICULAR HYPERTROPHY): Primary | ICD-10-CM

## 2020-11-12 DIAGNOSIS — G62.9 NEUROPATHY: ICD-10-CM

## 2020-11-12 DIAGNOSIS — F51.02 ADJUSTMENT INSOMNIA: ICD-10-CM

## 2020-11-12 PROCEDURE — G0463 HOSPITAL OUTPT CLINIC VISIT: HCPCS | Performed by: INTERNAL MEDICINE

## 2020-11-12 PROCEDURE — G8510 SCR DEP NEG, NO PLAN REQD: HCPCS | Performed by: INTERNAL MEDICINE

## 2020-11-12 PROCEDURE — 1101F PT FALLS ASSESS-DOCD LE1/YR: CPT | Performed by: INTERNAL MEDICINE

## 2020-11-12 PROCEDURE — G8399 PT W/DXA RESULTS DOCUMENT: HCPCS | Performed by: INTERNAL MEDICINE

## 2020-11-12 PROCEDURE — 99214 OFFICE O/P EST MOD 30 MIN: CPT | Performed by: INTERNAL MEDICINE

## 2020-11-12 PROCEDURE — 1090F PRES/ABSN URINE INCON ASSESS: CPT | Performed by: INTERNAL MEDICINE

## 2020-11-12 PROCEDURE — G8536 NO DOC ELDER MAL SCRN: HCPCS | Performed by: INTERNAL MEDICINE

## 2020-11-12 PROCEDURE — G8427 DOCREV CUR MEDS BY ELIG CLIN: HCPCS | Performed by: INTERNAL MEDICINE

## 2020-11-12 PROCEDURE — G8419 CALC BMI OUT NRM PARAM NOF/U: HCPCS | Performed by: INTERNAL MEDICINE

## 2020-11-12 RX ORDER — LISINOPRIL 2.5 MG/1
2.5 TABLET ORAL DAILY
Qty: 30 TAB | Refills: 2 | Status: SHIPPED | OUTPATIENT
Start: 2020-11-12 | End: 2021-02-09

## 2020-11-12 RX ORDER — ZALEPLON 5 MG/1
CAPSULE ORAL
Qty: 30 CAP | Refills: 0 | Status: SHIPPED | OUTPATIENT
Start: 2020-11-12 | End: 2020-12-27

## 2020-11-12 RX ORDER — GABAPENTIN 100 MG/1
CAPSULE ORAL
Qty: 150 CAP | Refills: 2 | Status: SHIPPED | OUTPATIENT
Start: 2020-11-12 | End: 2021-03-10 | Stop reason: SDUPTHER

## 2020-11-12 NOTE — PROGRESS NOTES
Polly Mart is a 68 y.o. female    Chief Complaint   Patient presents with    Follow-up     cardiac test results     Medication Evaluation     gabapentin        Health Maintenance Due   Topic Date Due    Shingrix Vaccine Age 49> (1 of 2) 04/10/1992    GLAUCOMA SCREENING Q2Y  04/10/2007    Medicare Yearly Exam  10/29/2018    Flu Vaccine (1) 09/01/2020       Visit Vitals  /78 (BP 1 Location: Left arm, BP Patient Position: Sitting)   Pulse 85   Temp 98.5 °F (36.9 °C) (Oral)   Resp 14   Ht 5' 1\" (1.549 m)   Wt 137 lb (62.1 kg)   SpO2 96%   BMI 25.89 kg/m²       3 most recent PHQ Screens 11/12/2020   Little interest or pleasure in doing things Not at all   Feeling down, depressed, irritable, or hopeless Not at all   Total Score PHQ 2 0       Fall Risk Assessment, last 12 mths 11/12/2020   Able to walk? Yes   Fall in past 12 months? No   Fall with injury? -   Number of falls in past 12 months -   Fall Risk Score -       Abuse Screening Questionnaire 10/9/2020   Do you ever feel afraid of your partner? N   Are you in a relationship with someone who physically or mentally threatens you? N   Is it safe for you to go home? Y         1. Have you been to the ER, urgent care clinic since your last visit? Hospitalized since your last visit?no    2. Have you seen or consulted any other health care providers outside of the [address] [place] [address] since your last visit? Include any pap smears or colon screening.  no

## 2020-11-12 NOTE — PROGRESS NOTES
HISTORY OF PRESENT ILLNESS  Cindy Veras is a 68 y.o. female. HPI  Ally Marquez is seen at follow up. Issues:  1. Echo did show LVH, EF of 60-65%. Her blood pressure is excellent now, but has been very labile. Discussed low dose ACE inhibitor to help protect against further ventricular hypertrophy. Discussed side effects of Lisinopril 2.5.    2. Neuropathy. She has successfully titrated Gabapentin down from a very high dose. She is now on a total of 1,200 mg a day in divided doses and willing to continue titration. Rates her pain as 6/10, only in her feet, worse with sitting. Not keeping her particularly from sleeping. Will drop to a total of 500 mg a day. 3. Chronic insomnia. Has been working on sleep hygiene strategies. Asks again for Sonata, which worked well for her in the past.  Wrote for 30, cautioned not to use more than three per week. Review of Systems   Constitutional: Negative for chills, diaphoresis, fever, malaise/fatigue and weight loss. Respiratory: Negative for cough, shortness of breath and wheezing. Cardiovascular: Negative for chest pain, palpitations, orthopnea, leg swelling and PND. Gastrointestinal: Negative for heartburn and nausea. Musculoskeletal: Negative for falls and myalgias. Neurological: Positive for sensory change (feet). Negative for dizziness and headaches. Psychiatric/Behavioral: The patient has insomnia. Physical Exam  Vitals signs and nursing note reviewed. Constitutional:       Appearance: She is well-developed. HENT:      Head: Normocephalic and atraumatic. Neck:      Musculoskeletal: Normal range of motion and neck supple. Thyroid: No thyromegaly. Vascular: No carotid bruit. Cardiovascular:      Rate and Rhythm: Normal rate and regular rhythm. Heart sounds: Normal heart sounds, S1 normal and S2 normal. No murmur. Pulmonary:      Effort: Pulmonary effort is normal. No respiratory distress.       Breath sounds: Normal breath sounds. No wheezing or rales. Musculoskeletal:      Right lower leg: No edema. Left lower leg: No edema. Neurological:      Mental Status: She is alert and oriented to person, place, and time. Psychiatric:         Behavior: Behavior normal.         ASSESSMENT and PLAN  Diagnoses and all orders for this visit:    1. LVH (left ventricular hypertrophy)  -     lisinopriL (PRINIVIL, ZESTRIL) 2.5 mg tablet; Take 1 Tab by mouth daily. 2. Adjustment insomnia  -     zaleplon (SONATA) 5 mg capsule; 1 po qhs prn insomnia not more than 3 per week    3.  Neuropathy  -     gabapentin (NEURONTIN) 100 mg capsule; 1 po bid and 3 po  qhs      the following changes in treatment are made: add lisinopril and sparing sonata and dec gabapentin dose further  appt in 2mo

## 2020-11-23 ENCOUNTER — OFFICE VISIT (OUTPATIENT)
Dept: CARDIOLOGY CLINIC | Age: 80
End: 2020-11-23
Payer: MEDICARE

## 2020-11-23 VITALS
OXYGEN SATURATION: 100 % | HEIGHT: 61 IN | DIASTOLIC BLOOD PRESSURE: 70 MMHG | SYSTOLIC BLOOD PRESSURE: 115 MMHG | BODY MASS INDEX: 26.06 KG/M2 | WEIGHT: 138 LBS | HEART RATE: 99 BPM

## 2020-11-23 DIAGNOSIS — I10 HYPERTENSION, UNSPECIFIED TYPE: ICD-10-CM

## 2020-11-23 DIAGNOSIS — R07.9 CHEST PAIN, UNSPECIFIED TYPE: Primary | ICD-10-CM

## 2020-11-23 PROCEDURE — 1100F PTFALLS ASSESS-DOCD GE2>/YR: CPT | Performed by: INTERNAL MEDICINE

## 2020-11-23 PROCEDURE — G8536 NO DOC ELDER MAL SCRN: HCPCS | Performed by: INTERNAL MEDICINE

## 2020-11-23 PROCEDURE — 99213 OFFICE O/P EST LOW 20 MIN: CPT | Performed by: INTERNAL MEDICINE

## 2020-11-23 PROCEDURE — G8419 CALC BMI OUT NRM PARAM NOF/U: HCPCS | Performed by: INTERNAL MEDICINE

## 2020-11-23 PROCEDURE — G8427 DOCREV CUR MEDS BY ELIG CLIN: HCPCS | Performed by: INTERNAL MEDICINE

## 2020-11-23 PROCEDURE — G8510 SCR DEP NEG, NO PLAN REQD: HCPCS | Performed by: INTERNAL MEDICINE

## 2020-11-23 PROCEDURE — 1090F PRES/ABSN URINE INCON ASSESS: CPT | Performed by: INTERNAL MEDICINE

## 2020-11-23 PROCEDURE — G8399 PT W/DXA RESULTS DOCUMENT: HCPCS | Performed by: INTERNAL MEDICINE

## 2020-11-23 PROCEDURE — G0463 HOSPITAL OUTPT CLINIC VISIT: HCPCS | Performed by: INTERNAL MEDICINE

## 2020-11-23 PROCEDURE — 3288F FALL RISK ASSESSMENT DOCD: CPT | Performed by: INTERNAL MEDICINE

## 2020-11-23 NOTE — PROGRESS NOTES
Office Follow-up    NAME: Estefania Castellano   :  1940  MRM:  [id number]    Date:  2020            Assessment:     Problem List  Date Reviewed: 2020          Codes Class Noted    Malignant neoplasm of left female breast [facility]) ICD-10-CM: T45.560  ICD-9-CM: 174.9  2017        Dyslipidemia, goal LDL below 100 ICD-10-CM: E78.5  ICD-9-CM: 272.4  2017                 Plan:     1. Jaw pain: This has resolved. Stress test is normal.  2. Hypertension: Blood pressure is well controlled. Continue lisinopril 2.5 mg p.o. daily. She has mild concentric left ventricular hypertrophy. 3. See Dr. Juanis Phillips as needed. ATTENTION:   This medical record was transcribed using an electronic medical records/speech recognition system. Although proofread, it may and can contain electronic, spelling and other errors. Corrections may be executed at a later time. Please feel free to contact us for any clarifications as needed. Subjective:     Estefania Castellano, a 68y.o. year-old who presents for follow-up. Denies any symptoms of chest pain. Jaw pain has significantly improved. She underwent a stress test and echocardiogram.  Stress test demonstrated normal perfusion. Echocardiogram demonstrated mild concentric left ventricular hypertrophy with mild mitral regurgitation. Blood pressure has been better controlled on lisinopril 2.5 mg p.o. daily.    --------------------      HPI: Estefania Castellano is a 68 y.o. female with past medical history significant for left breast cancer status post chemotherapy and radiation, insomnia, arthritis, possible mitral valve prolapse is being referred for further evaluation. She had an episode of severe hypertension few weeks ago when she was in [place] and was traveling. She had symptoms of headache and when she went to the ER her blood pressure was significantly elevated at 189 systolic. Of note she was taking clonidine only for insomnia.   There was some concern about clonidine causing rebound hypertension therefore clonidine was discontinued and she was started on chlorthalidone. About 4-5 days later she was admitted to the ER again and this time her blood pressure was low therefore even chlorthalidone was discontinued. She is returning today to see me to further evaluate. Denies any symptoms of chest pain, shortness of breath, lightheadedness or dizziness. She has symptoms of jaw pain which is off and on. There is no relationship of the jaw pain with the activities. Family history is significant for her brother having an alcohol ablation of the septal wall which tells me that he probably has hypertrophic cardiomyopathy. Exam:     Physical Exam:  Visit Vitals  /70 (BP 1 Location: Right arm, BP Patient Position: Standing)   Pulse 99   Ht 5' 1\" (1.549 m)   Wt 138 lb (62.6 kg)   SpO2 100%   BMI 26.07 kg/m²     General appearance - alert, well appearing, and in no distress  Mental status - affect appropriate to mood  Eyes - sclera anicteric, moist mucous membranes  Neck - supple, no significant adenopathy      Medications:     Current Outpatient Medications   Medication Sig    lisinopriL (PRINIVIL, ZESTRIL) 2.5 mg tablet Take 1 Tab by mouth daily.  gabapentin (NEURONTIN) 100 mg capsule 1 po bid and 3 po  qhs    linaCLOtide (Linzess) 290 mcg cap capsule Take 290 mcg by mouth as needed.  omeprazole (PRILOSEC) 20 mg capsule Take 1 Cap by mouth daily.  DULoxetine (CYMBALTA) 60 mg capsule Take 1 Cap by mouth daily.  zaleplon (SONATA) 5 mg capsule 1 po qhs prn insomnia not more than 3 per week     No current facility-administered medications for this visit. Diagnostic Data Review:       No specialty comments available.       Lab Review:     Lab Results   Component Value Date/Time    Cholesterol, total 239 (H) 11/21/2017 10:06 AM    HDL Cholesterol 74 11/21/2017 10:06 AM    LDL, calculated 137 (H) 11/21/2017 10:06 AM    Triglyceride 141 11/21/2017 10:06 AM     Lab Results   Component Value Date/Time    Creatinine (POC) 0.8 05/07/2018 05:36 PM    Creatinine 1.18 (H) 11/16/2019 08:21 PM     Lab Results   Component Value Date/Time    BUN 26 (H) 11/16/2019 08:21 PM     Lab Results   Component Value Date/Time    Potassium 4.1 11/16/2019 08:21 PM     No results found for: HBA1C, HGBE8, HSZ2FTMV  Lab Results   Component Value Date/Time    HGB 12.1 11/16/2019 08:21 PM     Lab Results   Component Value Date/Time    PLATELET 495 29/95/4274 08:21 PM     No results for input(s): CPK, CKMB, TROIQ in the last 72 hours. No lab exists for component: CKQMB, CPKMB             ___________________________________________________    Prasanna Wolff.  Zerita Schirmer, MD, [facility]

## 2020-11-23 NOTE — PROGRESS NOTES
Chief Complaint   Patient presents with    Hypertension    Chest Pain    Cholesterol Problem    Follow-up     1 month     Visit Vitals  /70 (BP 1 Location: Right arm, BP Patient Position: Standing)   Pulse 99   Ht 5' 1\" (1.549 m)   Wt 138 lb (62.6 kg)   SpO2 100%   BMI 26.07 kg/m²     Chest pain denied   SOB with stair climbing or fast walking  Dizziness in the morning, positional at other times  Swelling in hands/feet denied   Recent hospital stays denied     Vitals:    11/23/20 1539 11/23/20 1548 11/23/20 1551   BP: 122/79 122/75 115/70   BP 1 Location: Right arm  Right arm   BP Patient Position: Sitting  Standing   Pulse: 74 78 99   SpO2: 98% 98% 100%   Weight: 138 lb (62.6 kg)     Height: 5' 1\" (1.549 m)

## 2020-12-24 DIAGNOSIS — F51.02 ADJUSTMENT INSOMNIA: ICD-10-CM

## 2020-12-27 RX ORDER — ZALEPLON 5 MG/1
CAPSULE ORAL
Qty: 30 CAP | Refills: 0 | Status: SHIPPED | OUTPATIENT
Start: 2020-12-27 | End: 2021-01-12 | Stop reason: DRUGHIGH

## 2021-01-12 ENCOUNTER — OFFICE VISIT (OUTPATIENT)
Dept: INTERNAL MEDICINE CLINIC | Age: 81
End: 2021-01-12
Payer: MEDICARE

## 2021-01-12 VITALS
OXYGEN SATURATION: 97 % | RESPIRATION RATE: 20 BRPM | WEIGHT: 136 LBS | TEMPERATURE: 98 F | BODY MASS INDEX: 25.68 KG/M2 | SYSTOLIC BLOOD PRESSURE: 114 MMHG | DIASTOLIC BLOOD PRESSURE: 77 MMHG | HEIGHT: 61 IN | HEART RATE: 88 BPM

## 2021-01-12 DIAGNOSIS — G62.9 NEUROPATHY: ICD-10-CM

## 2021-01-12 DIAGNOSIS — F51.02 ADJUSTMENT INSOMNIA: ICD-10-CM

## 2021-01-12 DIAGNOSIS — I51.7 LVH (LEFT VENTRICULAR HYPERTROPHY): Primary | ICD-10-CM

## 2021-01-12 PROCEDURE — 3288F FALL RISK ASSESSMENT DOCD: CPT | Performed by: INTERNAL MEDICINE

## 2021-01-12 PROCEDURE — 1100F PTFALLS ASSESS-DOCD GE2>/YR: CPT | Performed by: INTERNAL MEDICINE

## 2021-01-12 PROCEDURE — G0463 HOSPITAL OUTPT CLINIC VISIT: HCPCS | Performed by: INTERNAL MEDICINE

## 2021-01-12 PROCEDURE — 99214 OFFICE O/P EST MOD 30 MIN: CPT | Performed by: INTERNAL MEDICINE

## 2021-01-12 PROCEDURE — G8419 CALC BMI OUT NRM PARAM NOF/U: HCPCS | Performed by: INTERNAL MEDICINE

## 2021-01-12 PROCEDURE — G8399 PT W/DXA RESULTS DOCUMENT: HCPCS | Performed by: INTERNAL MEDICINE

## 2021-01-12 PROCEDURE — 1090F PRES/ABSN URINE INCON ASSESS: CPT | Performed by: INTERNAL MEDICINE

## 2021-01-12 PROCEDURE — G8536 NO DOC ELDER MAL SCRN: HCPCS | Performed by: INTERNAL MEDICINE

## 2021-01-12 PROCEDURE — G8427 DOCREV CUR MEDS BY ELIG CLIN: HCPCS | Performed by: INTERNAL MEDICINE

## 2021-01-12 PROCEDURE — G8510 SCR DEP NEG, NO PLAN REQD: HCPCS | Performed by: INTERNAL MEDICINE

## 2021-01-12 RX ORDER — ZALEPLON 10 MG/1
CAPSULE ORAL
Qty: 30 CAP | Refills: 0 | Status: SHIPPED | OUTPATIENT
Start: 2021-01-12 | End: 2021-02-27

## 2021-01-12 NOTE — PROGRESS NOTES
HISTORY OF PRESENT ILLNESS  Tonya Carreon is a 68 y.o. female. HPI  Follow up for med check. Issues:  1. Hypertension, on Lisinopril 2.5. Blood pressure is great. No headaches, no dizzy spells. She has seen Dr. Manjit Santiago and had a good cardiac evaluation. 2. Insomnia. Using Sonata 5 mg three days a week. Would like to bump to 10. Still has some issues with sleep hygiene as she has moved into a new house in [place] and floors are being redone. 3. Neuropathy. Still has some symptoms in feet, but tolerating with our decrease in Gabapentin, down to one tab in the morning and three at night and will try dropping to two at night. Review of Systems   Constitutional: Negative for chills, fever and weight loss. Respiratory: Negative for cough, shortness of breath and wheezing. Cardiovascular: Negative for chest pain, palpitations, orthopnea, leg swelling and PND. Gastrointestinal: Negative for heartburn and nausea. Musculoskeletal: Negative for myalgias. Neurological: Negative for dizziness and headaches. Psychiatric/Behavioral: The patient has insomnia. Physical Exam  Vitals signs and nursing note reviewed. Constitutional:       Appearance: She is well-developed. HENT:      Head: Normocephalic and atraumatic. Neck:      Musculoskeletal: Normal range of motion and neck supple. Thyroid: No thyromegaly. Vascular: No carotid bruit. Cardiovascular:      Rate and Rhythm: Normal rate and regular rhythm. Heart sounds: Normal heart sounds, S1 normal and S2 normal. No murmur. Pulmonary:      Effort: Pulmonary effort is normal. No respiratory distress. Breath sounds: Normal breath sounds. No wheezing or rales. Neurological:      Mental Status: She is alert and oriented to person, place, and time. Psychiatric:         Behavior: Behavior normal.         ASSESSMENT and PLAN  Diagnoses and all orders for this visit:    1. LVH (left ventricular hypertrophy)    2. Adjustment insomnia  -     zaleplon (SONATA) 10 mg capsule; 1 qhs prn insomnia no more than 3 per week    3.  Neuropathy    Dec gabapentin to 2 qhs 1 262 [facility] dose of sonata from 5 to 10  Work on sleep hygiene

## 2021-02-09 DIAGNOSIS — I51.7 LVH (LEFT VENTRICULAR HYPERTROPHY): ICD-10-CM

## 2021-02-09 RX ORDER — LISINOPRIL 2.5 MG/1
TABLET ORAL
Qty: 30 TAB | Refills: 1 | Status: SHIPPED | OUTPATIENT
Start: 2021-02-09 | End: 2021-04-05 | Stop reason: SDUPTHER

## 2021-02-27 DIAGNOSIS — F51.02 ADJUSTMENT INSOMNIA: ICD-10-CM

## 2021-02-27 RX ORDER — ZALEPLON 10 MG/1
CAPSULE ORAL
Qty: 30 CAP | Refills: 0 | Status: SHIPPED | OUTPATIENT
Start: 2021-02-27 | End: 2021-03-10 | Stop reason: SDUPTHER

## 2021-03-10 ENCOUNTER — OFFICE VISIT (OUTPATIENT)
Dept: INTERNAL MEDICINE CLINIC | Age: 81
End: 2021-03-10
Payer: MEDICARE

## 2021-03-10 VITALS
RESPIRATION RATE: 20 BRPM | SYSTOLIC BLOOD PRESSURE: 138 MMHG | HEIGHT: 61 IN | HEART RATE: 86 BPM | DIASTOLIC BLOOD PRESSURE: 87 MMHG | BODY MASS INDEX: 25.26 KG/M2 | TEMPERATURE: 98 F | OXYGEN SATURATION: 97 % | WEIGHT: 133.8 LBS

## 2021-03-10 DIAGNOSIS — F41.9 ANXIETY: ICD-10-CM

## 2021-03-10 DIAGNOSIS — G62.0 CHEMOTHERAPY-INDUCED PERIPHERAL NEUROPATHY (HCC): ICD-10-CM

## 2021-03-10 DIAGNOSIS — C50.112 MALIGNANT NEOPLASM OF CENTRAL PORTION OF LEFT FEMALE BREAST, UNSPECIFIED ESTROGEN RECEPTOR STATUS (HCC): ICD-10-CM

## 2021-03-10 DIAGNOSIS — F51.02 ADJUSTMENT INSOMNIA: Primary | ICD-10-CM

## 2021-03-10 DIAGNOSIS — T45.1X5A CHEMOTHERAPY-INDUCED PERIPHERAL NEUROPATHY (HCC): ICD-10-CM

## 2021-03-10 PROCEDURE — 99214 OFFICE O/P EST MOD 30 MIN: CPT | Performed by: INTERNAL MEDICINE

## 2021-03-10 PROCEDURE — 1090F PRES/ABSN URINE INCON ASSESS: CPT | Performed by: INTERNAL MEDICINE

## 2021-03-10 PROCEDURE — 3288F FALL RISK ASSESSMENT DOCD: CPT | Performed by: INTERNAL MEDICINE

## 2021-03-10 PROCEDURE — G8536 NO DOC ELDER MAL SCRN: HCPCS | Performed by: INTERNAL MEDICINE

## 2021-03-10 PROCEDURE — G0463 HOSPITAL OUTPT CLINIC VISIT: HCPCS | Performed by: INTERNAL MEDICINE

## 2021-03-10 PROCEDURE — G8432 DEP SCR NOT DOC, RNG: HCPCS | Performed by: INTERNAL MEDICINE

## 2021-03-10 PROCEDURE — G8427 DOCREV CUR MEDS BY ELIG CLIN: HCPCS | Performed by: INTERNAL MEDICINE

## 2021-03-10 PROCEDURE — G8419 CALC BMI OUT NRM PARAM NOF/U: HCPCS | Performed by: INTERNAL MEDICINE

## 2021-03-10 PROCEDURE — G8399 PT W/DXA RESULTS DOCUMENT: HCPCS | Performed by: INTERNAL MEDICINE

## 2021-03-10 PROCEDURE — 1100F PTFALLS ASSESS-DOCD GE2>/YR: CPT | Performed by: INTERNAL MEDICINE

## 2021-03-10 RX ORDER — DULOXETIN HYDROCHLORIDE 30 MG/1
30 CAPSULE, DELAYED RELEASE ORAL DAILY
Qty: 30 CAP | Refills: 1 | Status: SHIPPED | OUTPATIENT
Start: 2021-03-10 | End: 2021-04-22

## 2021-03-10 RX ORDER — GABAPENTIN 100 MG/1
CAPSULE ORAL
Qty: 90 CAP | Refills: 1 | Status: SHIPPED | OUTPATIENT
Start: 2021-03-10 | End: 2021-04-08 | Stop reason: SDUPTHER

## 2021-03-10 RX ORDER — ZALEPLON 10 MG/1
CAPSULE ORAL
Qty: 30 CAP | Refills: 1 | Status: SHIPPED | OUTPATIENT
Start: 2021-03-10 | End: 2021-04-19 | Stop reason: SDUPTHER

## 2021-03-10 NOTE — PROGRESS NOTES
HISTORY OF PRESENT ILLNESS  Danial Bacon is a 68 y.o. female. MELVIN Cm is almost done moving to her new house in [place]. She has been sleeping on a mattress on the floor. She notes she is having trouble both with insomnia, more pain in legs, as well as more anxiety. Currently on Cymbalta 60 a day, Sonata 10 mg three days a week and Gabapentin 200 at night, 100 in the morning. 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 Again, her world is a little to[place] with the move. She is not feeling depressed, denies side effects from current meds, no edema, no bowel issues. Review of Systems   Constitutional: Positive for malaise/fatigue. Negative for chills, fever and weight loss. Respiratory: Negative for cough, shortness of breath and wheezing. Cardiovascular: Negative for chest pain, palpitations, orthopnea, leg swelling and PND. Gastrointestinal: Negative for heartburn and nausea. Musculoskeletal: Negative for myalgias. Neurological: Positive for sensory change (pain in legs). Negative for dizziness and headaches. Psychiatric/Behavioral: The patient is nervous/anxious and has insomnia. Physical Exam  Vitals signs and nursing note reviewed. Constitutional:       Appearance: She is well-developed. HENT:      Head: Normocephalic and atraumatic. Neck:      Musculoskeletal: Normal range of motion and neck supple. Thyroid: No thyromegaly. Vascular: No carotid bruit. Cardiovascular:      Rate and Rhythm: Normal rate and regular rhythm. Heart sounds: Normal heart sounds, S1 normal and S2 normal. No murmur. Pulmonary:      Effort: Pulmonary effort is normal. No respiratory distress. Breath sounds: Normal breath sounds. No wheezing or rales. Neurological:      Mental Status: She is alert and oriented to person, place, and time.    Psychiatric:         Behavior: Behavior normal.         ASSESSMENT and PLAN  Diagnoses and all orders for this visit: 1. Adjustment insomnia  -     zaleplon (SONATA) 10 mg capsule; TAKE ONE CAPSULE BY MOUTH ONCE AT BEDTIME AS NEEDED FOR INSOMNIA **NO MORE THAN 5 TABLETS PER WEEK**    2. Chemotherapy-induced peripheral neuropathy (HCC)  -     gabapentin (NEURONTIN) 100 mg capsule; 1 po qam and 2 po qhs    3. Malignant neoplasm of central portion of left female breast, unspecified estrogen receptor status ([facility]    4. Anxiety  -     DULoxetine (CYMBALTA) 30 mg capsule; Take 1 Cap by mouth daily.  Take every day with the 60 mg dose for total of 90 mg      the following changes in treatment are made: inc from 60 to 90 mg cymbalta and inc sonata to 5 nights a week  appt in [place]

## 2021-04-05 ENCOUNTER — TELEPHONE (OUTPATIENT)
Dept: INTERNAL MEDICINE CLINIC | Age: 81
End: 2021-04-05

## 2021-04-05 DIAGNOSIS — K21.9 GASTROESOPHAGEAL REFLUX DISEASE WITHOUT ESOPHAGITIS: ICD-10-CM

## 2021-04-05 DIAGNOSIS — I51.7 LVH (LEFT VENTRICULAR HYPERTROPHY): ICD-10-CM

## 2021-04-05 NOTE — TELEPHONE ENCOUNTER
PCP: Yasemin Ji MD    Last appt: 3/10/2021      Future Appointments   Date Time Provider Dorinda Jeffery   4/19/2021  1:15 PM Yasemin Ji MD [facility] BS AMB       Requested Prescriptions     Pending Prescriptions Disp Refills    omeprazole (PRILOSEC) 20 mg capsule 30 Cap 5     Sig: Take 1 Cap by mouth daily.  lisinopriL (PRINIVIL, ZESTRIL) 2.5 mg tablet 30 Tab 1   Patient is also requesting Meloxicam 15mg, did not see medication listed as current medication.  Medication previously prescribed by Elton Mejia MD    Prior labs and Blood pressures:  BP Readings from Last 3 Encounters:   03/10/21 138/87   01/12/21 114/77   11/23/20 115/70     Lab Results   Component Value Date/Time    Sodium 133 (L) 11/16/2019 08:21 PM    Potassium 4.1 11/16/2019 08:21 PM    Chloride 100 11/16/2019 08:21 PM    CO2 25 11/16/2019 08:21 PM    Anion gap 8 11/16/2019 08:21 PM    Glucose 107 (H) 11/16/2019 08:21 PM    BUN 26 (H) 11/16/2019 08:21 PM    Creatinine 1.18 (H) 11/16/2019 08:21 PM    BUN/Creatinine ratio 22 (H) 11/16/2019 08:21 PM    GFR est AA 54 (L) 11/16/2019 08:21 PM    GFR est non-AA 44 (L) 11/16/2019 08:21 PM    Calcium 9.4 11/16/2019 08:21 PM     No results found for: HBA1C, HGBE8, ESL2HLUN, XFH1QIOQ, EFU9NQPD  Lab Results   Component Value Date/Time    Cholesterol, total 239 (H) 11/21/2017 10:06 AM    HDL Cholesterol 74 11/21/2017 10:06 AM    LDL, calculated 137 (H) 11/21/2017 10:06 AM    VLDL, calculated 28 11/21/2017 10:06 AM    Triglyceride 141 11/21/2017 10:06 AM     No results found for: FREDY RogersRIDAVEY    Lab Results   Component Value Date/Time    TSH 1.110 06/21/2019 08:38 AM

## 2021-04-05 NOTE — TELEPHONE ENCOUNTER
----- Message from [facility] sent at 4/5/2021 12:08 PM EDT -----  Regarding: Dr. Horacio Talley (if not patient): Pt      Relationship of caller (if not patient): Pt      Best contact number(s): [phone]      Name of medication and dosage if known: Meloxicam 15mg, Lisinopril 2.5mg, Omeprazole 20mg      Is patient out of this medication (yes/no): No, 5 pills left      Pharmacy name: [address] [place] [address] John listed in chart? (yes/no): No  Pharmacy phone number: [phone]      Details to clarify the request: Going out of town early Thursday so needs them before then.       [facility]

## 2021-04-06 RX ORDER — MELOXICAM 15 MG/1
15 TABLET ORAL
Qty: 14 TAB | Refills: 0 | Status: SHIPPED | OUTPATIENT
Start: 2021-04-06 | End: 2021-04-22

## 2021-04-06 RX ORDER — OMEPRAZOLE 20 MG/1
20 CAPSULE, DELAYED RELEASE ORAL DAILY
Qty: 30 CAP | Refills: 5 | Status: SHIPPED | OUTPATIENT
Start: 2021-04-06 | End: 2021-06-22 | Stop reason: SDUPTHER

## 2021-04-06 RX ORDER — LISINOPRIL 2.5 MG/1
2.5 TABLET ORAL DAILY
Qty: 30 TAB | Refills: 1 | Status: SHIPPED | OUTPATIENT
Start: 2021-04-06 | End: 2021-06-15

## 2021-04-08 ENCOUNTER — TELEPHONE (OUTPATIENT)
Dept: INTERNAL MEDICINE CLINIC | Age: 81
End: 2021-04-08

## 2021-04-08 DIAGNOSIS — T45.1X5A CHEMOTHERAPY-INDUCED PERIPHERAL NEUROPATHY (HCC): ICD-10-CM

## 2021-04-08 DIAGNOSIS — G62.0 CHEMOTHERAPY-INDUCED PERIPHERAL NEUROPATHY (HCC): ICD-10-CM

## 2021-04-08 RX ORDER — GABAPENTIN 100 MG/1
CAPSULE ORAL
Qty: 90 CAP | Refills: 1 | Status: SHIPPED | OUTPATIENT
Start: 2021-04-08 | End: 2021-06-22 | Stop reason: SDUPTHER

## 2021-04-08 NOTE — TELEPHONE ENCOUNTER
Per patient's  , [facility] has not received patient's for gabapentin, mentioned the pharmacy was waiting on a call from us to okay an  early fill on the gabapentin due to them leaving town. PSR confirmed script was sent in per chart at 12:30pm but per  nothing has been received.  Confirmed script should be sent to  [facility] at [facility]

## 2021-04-08 NOTE — TELEPHONE ENCOUNTER
Patients  called to request wifes gabapentin be refilled, they are going out of town and would like to have some before hand. She is NOT out of medication but needs a refill before she leaves.

## 2021-04-09 ENCOUNTER — TELEPHONE (OUTPATIENT)
Dept: INTERNAL MEDICINE CLINIC | Age: 81
End: 2021-04-09

## 2021-04-09 NOTE — TELEPHONE ENCOUNTER
LVM informing pts  that we don't have the card to verify his and his wife's COVID vaccines, those cards should have been provided to them by the pharmacy or place that administered them.

## 2021-04-09 NOTE — TELEPHONE ENCOUNTER
----- Message from [facility] sent at 4/8/2021  4:46 PM EDT -----  Regarding: Dr. Shanna Liu Message/Vendor Calls     Caller's first and last name: Leanne Weber, spouse      Reason for call: Needs proof of pt covid vaccinations      Callback required yes/no and why: Yes, to confirm       Best contact number(s): [phone]       Details to clarify the request: N/A       [facility]

## 2021-04-19 ENCOUNTER — TELEPHONE (OUTPATIENT)
Dept: INTERNAL MEDICINE CLINIC | Age: 81
End: 2021-04-19

## 2021-04-19 ENCOUNTER — OFFICE VISIT (OUTPATIENT)
Dept: INTERNAL MEDICINE CLINIC | Age: 81
End: 2021-04-19
Payer: MEDICARE

## 2021-04-19 VITALS
HEART RATE: 93 BPM | OXYGEN SATURATION: 95 % | TEMPERATURE: 98.7 F | DIASTOLIC BLOOD PRESSURE: 64 MMHG | BODY MASS INDEX: 24.92 KG/M2 | SYSTOLIC BLOOD PRESSURE: 97 MMHG | HEIGHT: 61 IN | RESPIRATION RATE: 16 BRPM | WEIGHT: 132 LBS

## 2021-04-19 DIAGNOSIS — G62.9 NEUROPATHY: Primary | ICD-10-CM

## 2021-04-19 DIAGNOSIS — F51.02 ADJUSTMENT INSOMNIA: ICD-10-CM

## 2021-04-19 DIAGNOSIS — R68.82 DECREASED LIBIDO: ICD-10-CM

## 2021-04-19 PROCEDURE — 99214 OFFICE O/P EST MOD 30 MIN: CPT | Performed by: INTERNAL MEDICINE

## 2021-04-19 PROCEDURE — 1100F PTFALLS ASSESS-DOCD GE2>/YR: CPT | Performed by: INTERNAL MEDICINE

## 2021-04-19 PROCEDURE — G8432 DEP SCR NOT DOC, RNG: HCPCS | Performed by: INTERNAL MEDICINE

## 2021-04-19 PROCEDURE — 3288F FALL RISK ASSESSMENT DOCD: CPT | Performed by: INTERNAL MEDICINE

## 2021-04-19 PROCEDURE — G0463 HOSPITAL OUTPT CLINIC VISIT: HCPCS | Performed by: INTERNAL MEDICINE

## 2021-04-19 PROCEDURE — 1090F PRES/ABSN URINE INCON ASSESS: CPT | Performed by: INTERNAL MEDICINE

## 2021-04-19 PROCEDURE — G8420 CALC BMI NORM PARAMETERS: HCPCS | Performed by: INTERNAL MEDICINE

## 2021-04-19 PROCEDURE — G8427 DOCREV CUR MEDS BY ELIG CLIN: HCPCS | Performed by: INTERNAL MEDICINE

## 2021-04-19 PROCEDURE — G8399 PT W/DXA RESULTS DOCUMENT: HCPCS | Performed by: INTERNAL MEDICINE

## 2021-04-19 PROCEDURE — G8536 NO DOC ELDER MAL SCRN: HCPCS | Performed by: INTERNAL MEDICINE

## 2021-04-19 RX ORDER — ZALEPLON 10 MG/1
CAPSULE ORAL
Qty: 30 CAP | Refills: 2 | Status: SHIPPED | OUTPATIENT
Start: 2021-04-19 | End: 2021-06-22 | Stop reason: SDUPTHER

## 2021-04-19 NOTE — TELEPHONE ENCOUNTER
V/m left for patient's  notifying we have his wife's dates of receiving the covid vaccine but no actual documentation on file with details such as the lot # & who it was administered by. Asking him to return the office call to discuss further.

## 2021-04-19 NOTE — TELEPHONE ENCOUNTER
----- Message from [facility] sent at 4/19/2021  9:11 AM EDT -----  Regarding: Dr. Taina Maloney Message/Vendor Calls    Caller's first and last name: Ozzie Bowles      Reason for call: Needs proof of covid vaccination for travel      Callback required yes/no and why: Yes      Best contact number(s): [phone]      Details to clarify the request: N/A      [facility]

## 2021-04-19 NOTE — PROGRESS NOTES
HISTORY OF PRESENT ILLNESS  Tonya Bryant is a 80 y.o. female. HPI  Hans Sober is seen at follow up. Issues:  1. Chronic insomnia. She is currently using Sonata 10 mg three days a week and on other nights takes four Tylenol. Continues to struggle with sleep, although notes that after an hour of watching TV she gets back to bed. 2. Her anxiety did improve with increasing Duloxetine to a total of 90 a day. It also helped her neuropathy. She is maintained on Gabapentin 100 in the morning, 200 at night, and pain is manageable. 3. She does note somewhat low sex drive and wonders if there are any creams for this. She is looking forward to traveling with her daughter. Recently went to [facility] for grandson's wedding. Has completed the COVID vaccine series. Review of Systems   Constitutional: Negative for chills, fever and weight loss. Respiratory: Negative for cough, shortness of breath and wheezing. Cardiovascular: Negative for chest pain, palpitations, orthopnea, leg swelling and PND. Gastrointestinal: Negative for abdominal pain, heartburn and nausea. Musculoskeletal: Negative for myalgias. Neurological: Negative for dizziness and headaches. Psychiatric/Behavioral: Negative for depression and memory loss. The patient has insomnia. The patient is not nervous/anxious. Physical Exam  Vitals signs and nursing note reviewed. Constitutional:       Appearance: She is well-developed. HENT:      Head: Normocephalic and atraumatic. Neck:      Musculoskeletal: Normal range of motion and neck supple. Thyroid: No thyromegaly. Vascular: No carotid bruit. Cardiovascular:      Rate and Rhythm: Normal rate and regular rhythm. Heart sounds: Normal heart sounds, S1 normal and S2 normal. No murmur. Pulmonary:      Effort: Pulmonary effort is normal. No respiratory distress. Breath sounds: Normal breath sounds. No wheezing or rales.    Neurological:      Mental Status: She is alert and oriented to person, place, and time. Psychiatric:         Behavior: Behavior normal.         ASSESSMENT and PLAN  Diagnoses and all orders for this visit:    1. Neuropathy-cont gabapentin and duloxetine    2.  Adjustment insomnia  -     zaleplon (SONATA) 10 mg capsule; TAKE ONE CAPSULE BY MOUTH ONCE AT BEDTIME AS NEEDED FOR INSOMNIA **NO MORE THAN 5 TABLETS PER WEEK**    3. Decreased libido    bp is low today but she reports higher at home recently  Call me if running 90 or below at home  appt in 4mo

## 2021-04-22 DIAGNOSIS — F41.9 ANXIETY: ICD-10-CM

## 2021-04-22 RX ORDER — DULOXETIN HYDROCHLORIDE 30 MG/1
CAPSULE, DELAYED RELEASE ORAL
Qty: 30 CAP | Refills: 0 | Status: SHIPPED | OUTPATIENT
Start: 2021-04-22 | End: 2021-06-09

## 2021-04-22 RX ORDER — MELOXICAM 15 MG/1
TABLET ORAL
Qty: 14 TAB | Refills: 0 | Status: SHIPPED | OUTPATIENT
Start: 2021-04-22 | End: 2021-05-05

## 2021-04-25 DIAGNOSIS — G62.9 NEUROPATHY: ICD-10-CM

## 2021-04-26 RX ORDER — DULOXETIN HYDROCHLORIDE 60 MG/1
CAPSULE, DELAYED RELEASE ORAL
Qty: 90 CAP | Refills: 0 | Status: SHIPPED | OUTPATIENT
Start: 2021-04-26 | End: 2021-06-22 | Stop reason: SDUPTHER

## 2021-05-05 RX ORDER — MELOXICAM 15 MG/1
TABLET ORAL
Qty: 14 TAB | Refills: 0 | Status: SHIPPED | OUTPATIENT
Start: 2021-05-05 | End: 2021-05-05

## 2021-05-05 RX ORDER — LINACLOTIDE 290 UG/1
CAPSULE, GELATIN COATED ORAL
Qty: 90 CAP | Refills: 0 | Status: SHIPPED | OUTPATIENT
Start: 2021-05-05 | End: 2021-06-22 | Stop reason: SDUPTHER

## 2021-05-05 RX ORDER — MELOXICAM 15 MG/1
TABLET ORAL
Qty: 14 TAB | Refills: 0 | Status: SHIPPED | OUTPATIENT
Start: 2021-05-05 | End: 2021-06-08

## 2021-06-07 DIAGNOSIS — F41.9 ANXIETY: ICD-10-CM

## 2021-06-08 ENCOUNTER — TELEPHONE (OUTPATIENT)
Dept: INTERNAL MEDICINE CLINIC | Age: 81
End: 2021-06-08

## 2021-06-08 RX ORDER — MELOXICAM 15 MG/1
TABLET ORAL
Qty: 14 TABLET | Refills: 0 | Status: SHIPPED | OUTPATIENT
Start: 2021-06-08 | End: 2021-06-23

## 2021-06-08 NOTE — TELEPHONE ENCOUNTER
Provided  the info to Teodoro Richard NP with the [facility] neurology clinic. He voiced understanding.

## 2021-06-09 RX ORDER — DULOXETIN HYDROCHLORIDE 30 MG/1
CAPSULE, DELAYED RELEASE ORAL
Qty: 30 CAPSULE | Refills: 0 | Status: SHIPPED | OUTPATIENT
Start: 2021-06-09 | End: 2021-06-22 | Stop reason: SDUPTHER

## 2021-06-15 DIAGNOSIS — I51.7 LVH (LEFT VENTRICULAR HYPERTROPHY): ICD-10-CM

## 2021-06-15 RX ORDER — LISINOPRIL 2.5 MG/1
TABLET ORAL
Qty: 30 TABLET | Refills: 0 | Status: SHIPPED | OUTPATIENT
Start: 2021-06-15 | End: 2021-06-22 | Stop reason: SDUPTHER

## 2021-06-17 ENCOUNTER — TELEPHONE (OUTPATIENT)
Dept: INTERNAL MEDICINE CLINIC | Age: 81
End: 2021-06-17

## 2021-06-17 NOTE — TELEPHONE ENCOUNTER
----- Message from [facility] sent at 6/17/2021  1:25 PM EDT -----  Regarding: Dr. Joy Wong Message/Vendor Calls    Caller's first and last name: Adrián Harrell, spouse      Reason for call: Requesting some more samples of Linzess due to the processing of the paperwork for the [employer]      Callback required yes/no and why: Yes      Best contact number(s): [phone]      Details to clarify the request: [address]

## 2021-06-17 NOTE — TELEPHONE ENCOUNTER
V/m left for patient's  notifying the office does not keep med samples in the office. We have co-pay savings cards but not actual med samples.

## 2021-06-22 ENCOUNTER — OFFICE VISIT (OUTPATIENT)
Dept: NEUROLOGY | Age: 81
End: 2021-06-22
Payer: MEDICARE

## 2021-06-22 VITALS
BODY MASS INDEX: 24.92 KG/M2 | DIASTOLIC BLOOD PRESSURE: 72 MMHG | WEIGHT: 132 LBS | OXYGEN SATURATION: 100 % | SYSTOLIC BLOOD PRESSURE: 118 MMHG | HEIGHT: 61 IN | RESPIRATION RATE: 18 BRPM | HEART RATE: 83 BPM

## 2021-06-22 DIAGNOSIS — G62.0 CHEMOTHERAPY-INDUCED PERIPHERAL NEUROPATHY (HCC): ICD-10-CM

## 2021-06-22 DIAGNOSIS — F41.9 ANXIETY: ICD-10-CM

## 2021-06-22 DIAGNOSIS — F51.02 ADJUSTMENT INSOMNIA: ICD-10-CM

## 2021-06-22 DIAGNOSIS — I51.7 LVH (LEFT VENTRICULAR HYPERTROPHY): ICD-10-CM

## 2021-06-22 DIAGNOSIS — T45.1X5A CHEMOTHERAPY-INDUCED PERIPHERAL NEUROPATHY (HCC): ICD-10-CM

## 2021-06-22 DIAGNOSIS — G62.0 CHEMOTHERAPY-INDUCED PERIPHERAL NEUROPATHY (HCC): Primary | ICD-10-CM

## 2021-06-22 DIAGNOSIS — T45.1X5A CHEMOTHERAPY-INDUCED PERIPHERAL NEUROPATHY (HCC): Primary | ICD-10-CM

## 2021-06-22 DIAGNOSIS — K21.9 GASTROESOPHAGEAL REFLUX DISEASE WITHOUT ESOPHAGITIS: ICD-10-CM

## 2021-06-22 DIAGNOSIS — G62.9 NEUROPATHY: ICD-10-CM

## 2021-06-22 PROCEDURE — G8420 CALC BMI NORM PARAMETERS: HCPCS | Performed by: NURSE PRACTITIONER

## 2021-06-22 PROCEDURE — G8432 DEP SCR NOT DOC, RNG: HCPCS | Performed by: NURSE PRACTITIONER

## 2021-06-22 PROCEDURE — 99213 OFFICE O/P EST LOW 20 MIN: CPT | Performed by: NURSE PRACTITIONER

## 2021-06-22 PROCEDURE — 1100F PTFALLS ASSESS-DOCD GE2>/YR: CPT | Performed by: NURSE PRACTITIONER

## 2021-06-22 PROCEDURE — 3288F FALL RISK ASSESSMENT DOCD: CPT | Performed by: NURSE PRACTITIONER

## 2021-06-22 PROCEDURE — G8399 PT W/DXA RESULTS DOCUMENT: HCPCS | Performed by: NURSE PRACTITIONER

## 2021-06-22 PROCEDURE — G8536 NO DOC ELDER MAL SCRN: HCPCS | Performed by: NURSE PRACTITIONER

## 2021-06-22 PROCEDURE — 1090F PRES/ABSN URINE INCON ASSESS: CPT | Performed by: NURSE PRACTITIONER

## 2021-06-22 PROCEDURE — G8427 DOCREV CUR MEDS BY ELIG CLIN: HCPCS | Performed by: NURSE PRACTITIONER

## 2021-06-22 RX ORDER — LISINOPRIL 2.5 MG/1
2.5 TABLET ORAL DAILY
Qty: 90 TABLET | Refills: 0 | Status: SHIPPED | OUTPATIENT
Start: 2021-06-22 | End: 2021-08-10

## 2021-06-22 RX ORDER — MELOXICAM 15 MG/1
TABLET ORAL
Qty: 14 TABLET | Refills: 0 | Status: CANCELLED | OUTPATIENT
Start: 2021-06-22

## 2021-06-22 RX ORDER — ZALEPLON 10 MG/1
CAPSULE ORAL
Qty: 30 CAPSULE | Refills: 2 | Status: SHIPPED | OUTPATIENT
Start: 2021-06-22 | End: 2021-06-30 | Stop reason: SDUPTHER

## 2021-06-22 RX ORDER — DULOXETIN HYDROCHLORIDE 30 MG/1
30 CAPSULE, DELAYED RELEASE ORAL DAILY
Qty: 90 CAPSULE | Refills: 0 | Status: SHIPPED | OUTPATIENT
Start: 2021-06-22 | End: 2021-07-07

## 2021-06-22 RX ORDER — GABAPENTIN 100 MG/1
CAPSULE ORAL
Qty: 90 CAPSULE | Refills: 2 | Status: SHIPPED | OUTPATIENT
Start: 2021-06-22 | End: 2021-07-12 | Stop reason: SDUPTHER

## 2021-06-22 RX ORDER — DULOXETIN HYDROCHLORIDE 60 MG/1
60 CAPSULE, DELAYED RELEASE ORAL DAILY
Qty: 90 CAPSULE | Refills: 0 | Status: SHIPPED | OUTPATIENT
Start: 2021-06-22 | End: 2021-10-22 | Stop reason: SDUPTHER

## 2021-06-22 RX ORDER — OMEPRAZOLE 20 MG/1
20 CAPSULE, DELAYED RELEASE ORAL DAILY
Qty: 90 CAPSULE | Refills: 0 | Status: SHIPPED | OUTPATIENT
Start: 2021-06-22 | End: 2021-08-10

## 2021-06-22 NOTE — PROGRESS NOTES
[address] [facility],5Th [address]  [address]   [place] [address] [facility]   [phone] Office   [phone] Fax           Date:  21     Name:  Georgette Nguyen  :  1940  MRN:  [id number]     PCP:  Roderick Cannon MD    Chief Complaint   Patient presents with    Follow-up     pain; pt has reduced her gabapentin by 300 mg per day       HISTORY OF PRESENT ILLNESS:  Follow-up for chemo related peripheral neuropathy. At this point, she has weaned the gabapentin down to 100 mg in the morning and 200 mg at night. She indicates there is some nights where she has significant difficulty with the neuropathy to include heaviness and pain radiating up the legs to the knees from the feet. Sometimes the pain is such that she has difficulty climbing the stairs to go to bed. She notes that it is worse depending on the weather. Recap from LOV:  New onset headache in a 70-year-old lady with a history of breast cancer. While the headache did last about 6 weeks, this is now resolved. I suspect that it was a vascular headache secondary to elevation in her blood pressure. As it is resolved at this point, she has had normal imaging of her brain, and a normal exam as well as not normally being a headache lady, I would simply take a watch and wait approach to this at this point. With regard to her chemotherapy-induced peripheral neuropathy, this is not bothering her significantly even with reduction in the dosage of her gabapentin. If she does start the gabapentin and the neuropathy is still not bothersome, I would not pursue additional therapy at that point. We did discuss her issues with insomnia. We discussed sleep hygiene which includes going to bed at the same time and waking up at the same time every day, limiting caffeine use, and not lingering in bed if she is unable to sleep after about 45 minutes to 1 hour. Recommended that she actually get up if that is occurring, go do a nonstimulating activities such as reading or watching television that is not stimulating until she is sleepy and then going back to bed. She should not participate in any activities while in bed other than sleep and sex. Some of her issues with insomnia do actually sound more psychogenic in that she tends to either stay up until everything that she wants to do is done even if that takes her until 3 AM or worrying about what needs to be done whether it something that is directly related to an activity she needs to accomplish for somebody else. We discussed potentially trying to compartmentalize what is and is not important in terms of immediacy. Certainly if it is an issue that is related to activity for which someone else is actually responsible, then she really should not allow herself to worry about it. At this point, neurologically she does seem to be very stable. I am happy to help with any issues that she may have in the future. For now, I will plan to see her back if needed. Current Outpatient Medications   Medication Sig    lisinopriL (PRINIVIL, ZESTRIL) 2.5 mg tablet TAKE ONE TABLET BY MOUTH DAILY    DULoxetine (CYMBALTA) 30 mg capsule TAKE ONE CAPSULE BY MOUTH DAILY *TAKE EVERY DAY WITH 60MG DOSE FOR TOTAL OF 90MG*    meloxicam (MOBIC) 15 mg tablet TAKE ONE TABLET BY MOUTH DAILY AS NEEDED FOR PAIN    Linzess 290 mcg cap capsule TAKE ONE CAPSULE BY MOUTH DAILY    DULoxetine (CYMBALTA) 60 mg capsule TAKE ONE CAPSULE BY MOUTH DAILY    zaleplon (SONATA) 10 mg capsule TAKE ONE CAPSULE BY MOUTH ONCE AT BEDTIME AS NEEDED FOR INSOMNIA **NO MORE THAN 5 TABLETS PER WEEK**    gabapentin (NEURONTIN) 100 mg capsule 1 po qam and 2 po qhs    omeprazole (PRILOSEC) 20 mg capsule Take 1 Cap by mouth daily. No current facility-administered medications for this visit.      No Known Allergies  Past Medical History:   Diagnosis Date    Arthritis     Breast cancer ([facility] [address] 2015     left triple neg    Constipation     Irritable bowel syndrome (IBS)     Lymphedema     left arm    Mitral prolapse     Neurological disorder     chemotherapy induced neuropathy    Radiation therapy complication 2984    left breast ca     Past Surgical History:   Procedure Laterality Date    COLONOSCOPY N/A 5/31/2018    COLONOSCOPY performed by Betty [facility]sorio [facility] at [facility]  5/31/2018         HX BREAST BIOPSY Left yrs    neg; stereotactic    HX BREAST LUMPECTOMY Left 2015    HX BREAST REDUCTION Bilateral yrs ago    HX CHOLECYSTECTOMY      HX COLONOSCOPY      HX GYN      Hysterectomy    HX ORTHOPAEDIC Right     surgery on the ankle     Social History     Socioeconomic History    Marital status:      Spouse name: Not on file    Number of children: Not on file    Years of education: Not on file    Highest education level: Not on file   Occupational History    Not on file   Tobacco Use    Smoking status: Former Smoker    Smokeless tobacco: Never Used   Substance and Sexual Activity    Alcohol use: No    Drug use: No    Sexual activity: Yes     Partners: Male   Other Topics Concern    Not on file   Social History Narrative    Not on file     Social Determinants of Health     Financial Resource Strain:     Difficulty of Paying Living Expenses:    Food Insecurity:     Worried About Running Out of Food in the Last Year:     Ran Out of Food in the Last Year:    Transportation Needs:     Lack of Transportation (Medical):      Lack of Transportation (Non-Medical):    Physical Activity:     Days of Exercise per Week:     Minutes of Exercise per Session:    Stress:     Feeling of Stress :    Social Connections:     Frequency of Communication with Friends and Family:     Frequency of Social Gatherings with Friends and Family:     Attends Confucianism Services:     Active Member of Clubs or Organizations:     Attends Club or Organization Meetings:     Marital Status:    Intimate Partner Violence:     Fear of Current or Ex-Partner:     Emotionally Abused:     Physically Abused:     Sexually Abused:      Family History   Problem Relation Age of Onset    Breast Cancer Maternal Grandmother     Heart Disease Mother     Heart Disease Father        PHYSICAL EXAMINATION:    Visit Vitals  /72   Pulse 83   Resp 18   Ht 5' 1\" (1.549 m)   Wt 59.9 kg (132 lb)   SpO2 100%   BMI 24.94 kg/m²     General:  Well defined, nourished, and groomed individual in no acute distress. Neck: Supple, nontender, no bruits, no pain with resistance to active range of motion. Heart: Regular rate and rhythm, no murmurs, rub, or gallop. Normal S1S2. Lungs:  Clear to auscultation bilaterally with equal chest expansion, no cough, no wheeze  Musculoskeletal:  Extremities revealed no edema and had full range of motion of joints. Psych:  Good mood and bright affect    NEUROLOGICAL EXAMINATION:     Mental Status:   Alert and oriented to person, place, and time with recent and remote memory intact. Attention span and concentration are normal. Speech is fluent with a full fund of knowledge. Cranial Nerves:  I: smell Not tested   II: visual fields Full to confrontation   II: pupils Equal, round, reactive to light   II: optic disc No papilledema   III,VII: ptosis none   III,IV,VI: extraocular muscles  Full ROM   V: mastication normal   V: facial light touch sensation  normal   VII: facial muscle function   symmetric   VIII: hearing symmetric   IX: soft palate elevation  normal   XI: trapezius strength  5/5   XI: sternocleidomastoid strength 5/5   XI: neck flexion strength  5/5   XII: tongue  midline     Motor Examination: Normal tone, bulk, and strength, 5/5 muscle strength throughout. No cogwheel rigidity or clonus present. Sensory exam:  Normal throughout to pinprick, temperature, and vibration sense. Normal proprioception. Coordination:  Heel-to-shin was smooth and symmetrical bilaterally. Finger to nose and rapid arm movement testing was normal.   No resting or intention tremor    Gait and Station:  Steady while walking on toes, heels, and with tandem walking. Normal arm swing. No Rhomberg or pronator drift. No muscle wasting or fasiculations noted. Reflexes:  DTRs 2+ throughout. Toes downgoing. ASSESSMENT AND PLAN    ICD-10-CM ICD-9-CM    1. Chemotherapy-induced peripheral neuropathy (HCC)  G62.0 357.7     T45. 1X5A E933.1      Ongoing complaints of pain related to chemotherapy induced peripheral neuropathy. For the most part, she indicates that she is able to tolerate the peripheral neuropathy but that there are some nights when it is worse than others. We discussed multiple treatment options to include consistently taking gabapentin at a higher dose versus adding amitriptyline at night which may help her sleep as well. However, she is a bit uncomfortable adding additional therapy. We did discuss the potential for taking a slightly higher dose of gabapentin as needed which she seemed amenable to doing. Recommended on her worst nights that she can actually take somewhere between 300 mg and 400 mg of the gabapentin as needed. Follow-up as needed. Deepthi Rice

## 2021-06-22 NOTE — PROGRESS NOTES
Chief Complaint   Patient presents with    Follow-up     pain; pt has reduced her gabapentin by 300 mg per day     1. Have you been to the ER, urgent care clinic since your last visit? Hospitalized since your last visit? No     2. Have you seen or consulted any other health care providers outside of the [address] [place] [address] since your last visit? Include any pap smears or colon screening.  No     Visit Vitals  /72   Pulse 83   Resp 18   Ht 5' 1\" (1.549 m)   Wt 59.9 kg (132 lb)   SpO2 100%   BMI 24.94 kg/m²

## 2021-06-22 NOTE — TELEPHONE ENCOUNTER
Requesting 3 month supply. Requesting a written script to take [place] her on [place]. Requesting to  scripts tmrw afternoon.      She can be reached at [phone]

## 2021-06-22 NOTE — LETTER
6/22/2021 Patient: Saira Lyon YOB: 1940 Date of Visit: 6/22/2021 [place] [address] [address] [facility] [address] [address] [address] [employer] Dear Eliseo Knapp MD, Thank you for referring Ms. Ronaldo Holm to [facility] for evaluation. My notes for this consultation are attached. If you have questions, please do not hesitate to call me. I look forward to following your patient along with you.  
 
 
Sincerely, 
 
Isabel Choi NP

## 2021-06-23 RX ORDER — MELOXICAM 15 MG/1
TABLET ORAL
Qty: 14 TABLET | Refills: 0 | Status: SHIPPED | OUTPATIENT
Start: 2021-06-23 | End: 2021-07-07

## 2021-06-30 DIAGNOSIS — F51.02 ADJUSTMENT INSOMNIA: ICD-10-CM

## 2021-06-30 RX ORDER — ZALEPLON 10 MG/1
10 CAPSULE ORAL
Qty: 30 CAPSULE | Refills: 2 | Status: SHIPPED | OUTPATIENT
Start: 2021-06-30 | End: 2021-10-26 | Stop reason: SDUPTHER

## 2021-06-30 NOTE — TELEPHONE ENCOUNTER
Patient calling requesting new script for meds she gets 5 but she is having trouble sleeping still so she would like a prescription for 7 days. Please call back and advise.

## 2021-06-30 NOTE — TELEPHONE ENCOUNTER
V/m left for patient requesting a call back. Advised her Rx is written for a 30 day supply even though it says no more than 5 days per week so she could take the Rx nightly if she needed to. Office number left for patient to return this nurse's call.

## 2021-06-30 NOTE — TELEPHONE ENCOUNTER
She picked up an Rx last week for Sonata approved by  but on the script it notes she can only take 5 per week. She admits to taking nightly & cannot do every other night as suggested by PCP. She is requesting to  a new script (needs to be printed) tomorrow & will bring the old script back & would like the 5 days a week removed off her script. She is going to [place] for the next 3 months. Please advise.

## 2021-06-30 NOTE — TELEPHONE ENCOUNTER
----- Message from Cassidy Malik sent at 6/30/2021  2:55 PM EDT -----  Regarding: Dr. Estelle Fuller  Patient return call    Caller's first and last name and relationship (if not the patient): Pt       Best contact number(s): [phone]       Whose call is being returned: Sindy Gee      Details to clarify the request: States she will be going out of town the medications need doctor's approval to fill early. She will be gone 3 months and wants a new prescription so she doesn't have trouble filling it early when she is out of town.        Cassidy Malik

## 2021-07-01 ENCOUNTER — TELEPHONE (OUTPATIENT)
Dept: INTERNAL MEDICINE CLINIC | Age: 81
End: 2021-07-01

## 2021-07-01 NOTE — TELEPHONE ENCOUNTER
Patient's  physically presented to the office to  his wife's Sonata 10mg printed prescription for them to take on their 3 month trip to [place]. Patient's  was appreciative of the new prescription, but he expressed concern because his wife is completely out of the medication now. Prescription directions state to take one capsule by mouth at bedtime as needed for insomnia, but no more than 5 capsules per week. Patient's  reports that his wife tries to avoid taking this medication every night, but she is not able to get any sleep without it, so more often than not, she is taking this medication every night & now she is out of medication. Patient's  is requesting that a prescription for an additional 8 capsules be sent to his wife's pharmacy by close of business tomorrow (7/2/2021) because his wife feels terrible when she doesn't get her sleep. Nurse explained to patient's  that the weekly capsule limit is a precaution because this medication can be habit forming & if taken every night for an extended period of time, the medication has been linked to dementia in the elderly (inquiry made to another provider that was still in the office, who in fact had participated in a similar discussion with patient's  when patient's  had his last PCP appt). Patient's  verbalized understanding of this information, but stated that his wife's lack of sleep is also detrimental to her health & other over the counter medications like benadryl & melatonin don't work for his wife. Patient's  would like for a message to be sent to patient's PCP requesting the additional 8 capsules please. Nurse agreed to send the message to PCP & will follow-up with patient's  tomorrow with PCP's response. Patient's  verbalized appreciation of assistance & left the office. Nurse forwarded message to PCP.

## 2021-07-02 NOTE — TELEPHONE ENCOUNTER
Nurse spoke with PCP & received PCP's verbal order of approval to call in a new prescription for patient: Zaleplon (generic Sonata) 10mg capsules w/ a quantity of 30 capsules with no refills at this time. Dosage Instructions: Take one capsule every night at bedtime for insomnia: Max Daily Amount: 10mg. Nurse spoke with Tomy Carr Pharmacist, prescription called in & accepted by [facility]. Nurse left a voicemail message for patient's  notifying him that patient's prescription has been called in to her [facility]. Patient's  called office as he saw the office number on his caller ID. Nurse provided patient with this same information about the prescription. Patient's  verbalized appreciation for the assistance. Nurse encouraged patient's  & patient to call PCP's office with any follow-up questions or concerns before or during their 3 month trip to [place]. Patient's  again said thank you.

## 2021-07-02 NOTE — TELEPHONE ENCOUNTER
Per PCP approval, nurse called patient's [address] [place] to request a full of 8 of the patient's generic Sonata capsules, but the pharmacy will not fill just 8 capsules. Patient's last fill on this medication was 35 days ago, so the pharmacist has asked if PCP would like to call in a full month's prescription with the new dosage instructions to take 1 capsule every night at bedtime. Nurse attempted to call patient, but no answer & patient's name is not listed in the voicemail greeting. Nurse attempted to call patient's  at home, but no answer & no voicemail picked up. Nurse will forward a message to PCP to see if it's appropriate to call in & fill 30 days of the medication now. Message has been sent to PCP.

## 2021-07-07 DIAGNOSIS — F41.9 ANXIETY: ICD-10-CM

## 2021-07-07 RX ORDER — DULOXETIN HYDROCHLORIDE 30 MG/1
CAPSULE, DELAYED RELEASE ORAL
Qty: 30 CAPSULE | Refills: 0 | Status: SHIPPED | OUTPATIENT
Start: 2021-07-07 | End: 2021-11-04 | Stop reason: SDUPTHER

## 2021-07-07 RX ORDER — MELOXICAM 15 MG/1
TABLET ORAL
Qty: 14 TABLET | Refills: 0 | Status: SHIPPED | OUTPATIENT
Start: 2021-07-07 | End: 2021-07-19 | Stop reason: SDUPTHER

## 2021-07-09 ENCOUNTER — TELEPHONE (OUTPATIENT)
Dept: INTERNAL MEDICINE CLINIC | Age: 81
End: 2021-07-09

## 2021-07-09 DIAGNOSIS — G62.0 CHEMOTHERAPY-INDUCED PERIPHERAL NEUROPATHY (HCC): ICD-10-CM

## 2021-07-09 DIAGNOSIS — T45.1X5A CHEMOTHERAPY-INDUCED PERIPHERAL NEUROPATHY (HCC): ICD-10-CM

## 2021-07-09 NOTE — TELEPHONE ENCOUNTER
Patient was given a printed script on 6/22. Notified patient & her  of this. Patient states she sometimes take an extra pill here & there for increased nerve pain & is worried she will run out of medication too early while in [place]. Advised patient to fill the printed scripts in [place] & if starts to run out sooner than the next fill to contact us to discuss. Patient voiced understanding.

## 2021-07-10 RX ORDER — GABAPENTIN 100 MG/1
CAPSULE ORAL
Qty: 90 CAPSULE | Refills: 0 | OUTPATIENT
Start: 2021-07-10

## 2021-07-10 NOTE — TELEPHONE ENCOUNTER
This was ordered on 6/22 with 2 refills so she should not need a new order yet-can you call the pharmacy about this-thanks

## 2021-07-12 DIAGNOSIS — T45.1X5A CHEMOTHERAPY-INDUCED PERIPHERAL NEUROPATHY (HCC): ICD-10-CM

## 2021-07-12 DIAGNOSIS — G62.0 CHEMOTHERAPY-INDUCED PERIPHERAL NEUROPATHY (HCC): ICD-10-CM

## 2021-07-12 RX ORDER — GABAPENTIN 100 MG/1
CAPSULE ORAL
Qty: 90 CAPSULE | Refills: 0 | Status: SHIPPED | OUTPATIENT
Start: 2021-07-12 | End: 2021-11-04 | Stop reason: SDUPTHER

## 2021-07-12 NOTE — TELEPHONE ENCOUNTER
----- Message from Zelda Carpenter sent at 7/12/2021 12:55 PM EDT -----  Regarding: /telephone  Contact: [phone]  General Message/Vendor Calls    Caller's first and last name: N/A      Reason for call: She will not start the rx refills written until she gets to [place] starting in August.  She only has 2 pills of Gabapentin left and she needs just a rx written for just this month. She will be leaving for [facility]. She would like that rx to be sent to [facility].        Callback required yes/no and why: Yes      Best contact number(s):[phone]      Details to clarify the request: N/A      Zelda Carpenter

## 2021-07-19 RX ORDER — MELOXICAM 15 MG/1
15 TABLET ORAL DAILY
Qty: 90 TABLET | Refills: 0 | Status: SHIPPED | OUTPATIENT
Start: 2021-07-19 | End: 2021-10-09

## 2021-07-19 NOTE — TELEPHONE ENCOUNTER
----- Message from Hortensia Rodriguez sent at 7/19/2021 12:52 PM EDT -----  Regarding: Dr. Reynold Lin (if not patient): n/a      Relationship of caller (if not patient): n/a      Best contact number(s):[phone]      Name of medication and dosage if known: Meloxicam      Is patient out of this medication (yes/no):      Pharmacy name: [address] [place] [address] listed in chart? (yes/no): no  Pharmacy phone number: [phone]      Details to clarify the request: out of town      Hortensia Rodriguez

## 2021-08-10 DIAGNOSIS — I51.7 LVH (LEFT VENTRICULAR HYPERTROPHY): ICD-10-CM

## 2021-08-10 DIAGNOSIS — K21.9 GASTROESOPHAGEAL REFLUX DISEASE WITHOUT ESOPHAGITIS: ICD-10-CM

## 2021-08-10 RX ORDER — OMEPRAZOLE 20 MG/1
CAPSULE, DELAYED RELEASE ORAL
Qty: 30 CAPSULE | Refills: 4 | Status: SHIPPED | OUTPATIENT
Start: 2021-08-10 | End: 2021-10-22 | Stop reason: SDUPTHER

## 2021-08-10 RX ORDER — LISINOPRIL 2.5 MG/1
TABLET ORAL
Qty: 30 TABLET | Refills: 0 | Status: SHIPPED | OUTPATIENT
Start: 2021-08-10 | End: 2021-10-09

## 2021-09-13 ENCOUNTER — TELEPHONE (OUTPATIENT)
Dept: INTERNAL MEDICINE CLINIC | Age: 81
End: 2021-09-13

## 2021-09-13 NOTE — TELEPHONE ENCOUNTER
----- Message from Sam Willis sent at 9/13/2021  1:24 PM EDT -----  Regarding: /telephone  Contact: [phone]  General Message/Vendor Calls    Caller's first and last name:maira ku()      Reason for call:would like a cb to discuss a treatment program       Callback required yes/no and why:yes      Best contact number(s):[phone]      Details to clarify the request:      Sam Willis

## 2021-09-13 NOTE — TELEPHONE ENCOUNTER
Returned call to patient. She states she saw neurology recently for her neuropathy who recommended looking into doing laser treatment. Patient questioned if this would be a covered service under medicare. Advised to discuss with the office offering the service regarding coverage of the procedure. Patient voiced understanding.

## 2021-10-09 DIAGNOSIS — I51.7 LVH (LEFT VENTRICULAR HYPERTROPHY): ICD-10-CM

## 2021-10-09 RX ORDER — LISINOPRIL 2.5 MG/1
TABLET ORAL
Qty: 90 TABLET | Refills: 0 | Status: SHIPPED | OUTPATIENT
Start: 2021-10-09 | End: 2021-12-31

## 2021-10-09 RX ORDER — MELOXICAM 15 MG/1
TABLET ORAL
Qty: 90 TABLET | Refills: 0 | Status: SHIPPED | OUTPATIENT
Start: 2021-10-09 | End: 2021-11-28

## 2021-10-21 DIAGNOSIS — G62.9 NEUROPATHY: ICD-10-CM

## 2021-10-21 DIAGNOSIS — K21.9 GASTROESOPHAGEAL REFLUX DISEASE WITHOUT ESOPHAGITIS: ICD-10-CM

## 2021-10-22 RX ORDER — OMEPRAZOLE 20 MG/1
20 CAPSULE, DELAYED RELEASE ORAL DAILY
Qty: 30 CAPSULE | Refills: 0 | Status: SHIPPED | OUTPATIENT
Start: 2021-10-22 | End: 2021-10-26 | Stop reason: SDUPTHER

## 2021-10-22 RX ORDER — OMEPRAZOLE 20 MG/1
CAPSULE, DELAYED RELEASE ORAL
Qty: 90 CAPSULE | OUTPATIENT
Start: 2021-10-22

## 2021-10-22 RX ORDER — DULOXETIN HYDROCHLORIDE 60 MG/1
CAPSULE, DELAYED RELEASE ORAL
Qty: 90 CAPSULE | Refills: 0 | OUTPATIENT
Start: 2021-10-22

## 2021-10-22 RX ORDER — DULOXETIN HYDROCHLORIDE 60 MG/1
60 CAPSULE, DELAYED RELEASE ORAL DAILY
Qty: 30 CAPSULE | Refills: 0 | Status: SHIPPED | OUTPATIENT
Start: 2021-10-22 | End: 2021-10-26 | Stop reason: SDUPTHER

## 2021-10-26 ENCOUNTER — VIRTUAL VISIT (OUTPATIENT)
Dept: INTERNAL MEDICINE CLINIC | Age: 81
End: 2021-10-26
Payer: MEDICARE

## 2021-10-26 DIAGNOSIS — G62.9 NEUROPATHY: ICD-10-CM

## 2021-10-26 DIAGNOSIS — I10 HTN, GOAL BELOW 130/80: Primary | ICD-10-CM

## 2021-10-26 DIAGNOSIS — K21.9 GASTROESOPHAGEAL REFLUX DISEASE WITHOUT ESOPHAGITIS: ICD-10-CM

## 2021-10-26 DIAGNOSIS — T45.1X5A CHEMOTHERAPY-INDUCED PERIPHERAL NEUROPATHY (HCC): ICD-10-CM

## 2021-10-26 DIAGNOSIS — F41.9 ANXIETY: ICD-10-CM

## 2021-10-26 DIAGNOSIS — F51.02 ADJUSTMENT INSOMNIA: ICD-10-CM

## 2021-10-26 DIAGNOSIS — G62.0 CHEMOTHERAPY-INDUCED PERIPHERAL NEUROPATHY (HCC): ICD-10-CM

## 2021-10-26 PROCEDURE — 99442 PR PHYS/QHP TELEPHONE EVALUATION 11-20 MIN: CPT | Performed by: INTERNAL MEDICINE

## 2021-10-26 RX ORDER — OMEPRAZOLE 20 MG/1
20 CAPSULE, DELAYED RELEASE ORAL DAILY
Qty: 30 CAPSULE | Refills: 0 | Status: SHIPPED | OUTPATIENT
Start: 2021-10-26 | End: 2021-11-04 | Stop reason: SDUPTHER

## 2021-10-26 RX ORDER — ZALEPLON 10 MG/1
10 CAPSULE ORAL
Qty: 25 CAPSULE | Refills: 0 | Status: SHIPPED | OUTPATIENT
Start: 2021-10-26 | End: 2021-11-01 | Stop reason: ALTCHOICE

## 2021-10-26 RX ORDER — OMEPRAZOLE 20 MG/1
CAPSULE, DELAYED RELEASE ORAL
Qty: 30 CAPSULE | Refills: 0 | Status: SHIPPED | OUTPATIENT
Start: 2021-10-26 | End: 2022-10-28

## 2021-10-26 RX ORDER — DULOXETIN HYDROCHLORIDE 60 MG/1
60 CAPSULE, DELAYED RELEASE ORAL DAILY
Qty: 30 CAPSULE | Refills: 0 | Status: SHIPPED | OUTPATIENT
Start: 2021-10-26 | End: 2021-11-28

## 2021-10-26 NOTE — PROGRESS NOTES
Consent: Ancelmo Ramos, who was seen by synchronous (real-time) -video technology, and/or her healthcare decision maker, is aware that this patient-initiated, Telehealth encounter on 10/26/2021 is a billable service, with coverage as determined by her insurance carrier. She is aware that she may receive a bill and has provided verbal consent to proceed: YES  712  Subjective:   Ancelmo Ramos is a 80 y.o. female who was seen for Medication Evaluation    She could not get the audio component of Doxy to work because of limitations with her phone. This was an entirely verbal conversation, primarily she wants refills on meds. Discussed that we are long past due for an in person visit and due for labs and she is willing to come in in the next month. Does still take Omeprazole, Sonata 10 mg prn and Duloxetine, a total of 90 mg, and requests refills on these meds alone. Current Outpatient Medications   Medication Sig    DULoxetine (CYMBALTA) 60 mg capsule Take 1 Capsule by mouth daily. Take with the 30 mg to equal 90 mg daily    omeprazole (PRILOSEC) 20 mg capsule Take 1 Capsule by mouth daily.  zaleplon (SONATA) 10 mg capsule Take 1 Capsule by mouth nightly. Max Daily Amount: 10 mg. No more than 5 per week    lisinopriL (PRINIVIL, ZESTRIL) 2.5 mg tablet TAKE 1 TABLET BY MOUTH EVERY DAY    meloxicam (MOBIC) 15 mg tablet TAKE 1 TABLET BY MOUTH EVERY DAY    gabapentin (NEURONTIN) 100 mg capsule 1 po qam and 2 po qhs    DULoxetine (CYMBALTA) 30 mg capsule TAKE ONE CAPSULE BY MOUTH DAILY *TAKE WITH 60MG DOSE FOR TOTAL OF 90MG PER DAY*    linaCLOtide (Linzess) 290 mcg cap capsule Take 1 Capsule by mouth daily. No current facility-administered medications for this visit.        No Known Allergies    Past Medical History:   Diagnosis Date    Arthritis     Breast cancer ([facility] [address] 2015     left triple neg    Constipation     Irritable bowel syndrome (IBS)     Lymphedema     left arm    Mitral prolapse     Neurological disorder     chemotherapy induced neuropathy    Radiation therapy complication 3058    left breast ca       ROS  All other systems reviewed and negative, unless mentioned in HPI    Objective:   Vital Signs: (As obtained by patient/caregiver at home)  There were no vitals taken for this visit. [INSTRUCTIONS:  \"[x]\" Indicates a positive item  \"[]\" Indicates a negative item  -- DELETE ALL ITEMS NOT EXAMINED]    Constitutional: [x] Appears well-developed and well-nourished [x] No apparent distress      [] Abnormal -     Mental status: [x] Alert and awake  [x] Oriented to person/place/time [x] Able to follow commands    [] Abnormal -     Eyes:   EOM    [x]  Normal    [] Abnormal -   Sclera  [x]  Normal    [] Abnormal -          Discharge [x]  None visible   [] Abnormal -     HENT: [x] Normocephalic, atraumatic  [] Abnormal -       External Ears [x] Normal  [] Abnormal -    Neck: [x] No visualized mass [] Abnormal -     Pulmonary/Chest: [x] Respiratory effort normal   [x] No visualized signs of difficulty breathing or respiratory distress        [] Abnormal -      Musculoskeletal:            [x] Normal range of motion of neck        [] Abnormal -     Neurological:        [x] No Facial Asymmetry (Cranial nerve 7 motor function) (limited exam due to video visit)          [x] No gaze palsy        [] Abnormal -          Skin:        [x] No significant exanthematous lesions or discoloration noted on facial skin         [] Abnormal -            Psychiatric:       [x] Normal Affect [] Abnormal -           Other pertinent observable physical exam findings:-        Assessment & Plan:   Diagnoses and all orders for this visit:    1. HTN, goal below 130/80    2. Anxiety    3. Chemotherapy-induced peripheral neuropathy ([address]    4. Neuropathy  -     DULoxetine (CYMBALTA) 60 mg capsule; Take 1 Capsule by mouth daily. Take with the 30 mg to equal 90 mg daily    5.  Gastroesophageal reflux disease without esophagitis  - omeprazole (PRILOSEC) 20 mg capsule; Take 1 Capsule by mouth daily. 6. Adjustment insomnia  -     zaleplon (SONATA) 10 mg capsule; Take 1 Capsule by mouth nightly. Max Daily Amount: 10 mg. No more than 5 per week    appt in next mo  With labs and she is agreeable  Time on phone 10 min        We discussed the expected course, resolution and complications of the diagnosis(es) in detail. Medication risks, benefits, costs, interactions, and alternatives were discussed as indicated. I advised her to contact the office if her condition worsens, changes or fails to improve as anticipated. She expressed understanding with the diagnosis(es) and plan. Hilaria Han is a 80 y.o. female being evaluated by a video visit encounter for concerns as above. A caregiver was present when appropriate. Due to this being a TeleHealth encounter (During LRUFX-83 public health emergency), evaluation of the following organ systems was limited: Vitals/Constitutional/EENT/Resp/CV/GI//MS/Neuro/Skin/Heme-Lymph-Imm. Pursuant to the emergency declaration under the [facility] waiver authority and the [employer] Act, this Virtual  Visit was conducted, with patient's (and/or legal guardian's) consent, to reduce the patient's risk of exposure to COVID-19 and provide necessary medical care. Services were provided through a video synchronous discussion virtually to substitute for in-person clinic visit. Patient and provider were located at their individual homes.

## 2021-11-01 ENCOUNTER — OFFICE VISIT (OUTPATIENT)
Dept: INTERNAL MEDICINE CLINIC | Age: 81
End: 2021-11-01
Payer: MEDICARE

## 2021-11-01 VITALS
DIASTOLIC BLOOD PRESSURE: 74 MMHG | SYSTOLIC BLOOD PRESSURE: 114 MMHG | BODY MASS INDEX: 25.07 KG/M2 | HEART RATE: 93 BPM | WEIGHT: 132.8 LBS | RESPIRATION RATE: 16 BRPM | TEMPERATURE: 97.9 F | OXYGEN SATURATION: 95 % | HEIGHT: 61 IN

## 2021-11-01 DIAGNOSIS — F51.02 ADJUSTMENT INSOMNIA: ICD-10-CM

## 2021-11-01 DIAGNOSIS — C50.112 MALIGNANT NEOPLASM OF CENTRAL PORTION OF LEFT FEMALE BREAST, UNSPECIFIED ESTROGEN RECEPTOR STATUS (HCC): ICD-10-CM

## 2021-11-01 DIAGNOSIS — Z00.00 MEDICARE ANNUAL WELLNESS VISIT, SUBSEQUENT: Primary | ICD-10-CM

## 2021-11-01 DIAGNOSIS — I10 HTN, GOAL BELOW 130/80: ICD-10-CM

## 2021-11-01 DIAGNOSIS — T45.1X5A CHEMOTHERAPY-INDUCED PERIPHERAL NEUROPATHY (HCC): ICD-10-CM

## 2021-11-01 DIAGNOSIS — K59.09 CHRONIC CONSTIPATION: ICD-10-CM

## 2021-11-01 DIAGNOSIS — G62.0 CHEMOTHERAPY-INDUCED PERIPHERAL NEUROPATHY (HCC): ICD-10-CM

## 2021-11-01 LAB
ALBUMIN SERPL-MCNC: 4.1 G/DL (ref 3.5–5)
ALBUMIN/GLOB SERPL: 1.2 {RATIO} (ref 1.1–2.2)
ALP SERPL-CCNC: 83 U/L (ref 45–117)
ALT SERPL-CCNC: 27 U/L (ref 12–78)
ANION GAP SERPL CALC-SCNC: 4 MMOL/L (ref 5–15)
AST SERPL-CCNC: 16 U/L (ref 15–37)
BASOPHILS # BLD: 0.1 K/UL (ref 0–0.1)
BASOPHILS NFR BLD: 1 % (ref 0–1)
BILIRUB SERPL-MCNC: 0.3 MG/DL (ref 0.2–1)
BUN SERPL-MCNC: 30 MG/DL (ref 6–20)
BUN/CREAT SERPL: 27 (ref 12–20)
CALCIUM SERPL-MCNC: 9.8 MG/DL (ref 8.5–10.1)
CHLORIDE SERPL-SCNC: 107 MMOL/L (ref 97–108)
CHOLEST SERPL-MCNC: 283 MG/DL
CO2 SERPL-SCNC: 29 MMOL/L (ref 21–32)
CREAT SERPL-MCNC: 1.13 MG/DL (ref 0.55–1.02)
DIFFERENTIAL METHOD BLD: NORMAL
EOSINOPHIL # BLD: 0.3 K/UL (ref 0–0.4)
EOSINOPHIL NFR BLD: 4 % (ref 0–7)
ERYTHROCYTE [DISTWIDTH] IN BLOOD BY AUTOMATED COUNT: 12.7 % (ref 11.5–14.5)
GLOBULIN SER CALC-MCNC: 3.5 G/DL (ref 2–4)
GLUCOSE SERPL-MCNC: 124 MG/DL (ref 65–100)
HCT VFR BLD AUTO: 39.3 % (ref 35–47)
HCV AB SERPL QL IA: NONREACTIVE
HDLC SERPL-MCNC: 75 MG/DL
HDLC SERPL: 3.8 {RATIO} (ref 0–5)
HGB BLD-MCNC: 12.7 G/DL (ref 11.5–16)
IMM GRANULOCYTES # BLD AUTO: 0 K/UL (ref 0–0.04)
IMM GRANULOCYTES NFR BLD AUTO: 0 % (ref 0–0.5)
LDLC SERPL CALC-MCNC: 141.2 MG/DL (ref 0–100)
LYMPHOCYTES # BLD: 1.8 K/UL (ref 0.8–3.5)
LYMPHOCYTES NFR BLD: 26 % (ref 12–49)
MCH RBC QN AUTO: 30.2 PG (ref 26–34)
MCHC RBC AUTO-ENTMCNC: 32.3 G/DL (ref 30–36.5)
MCV RBC AUTO: 93.6 FL (ref 80–99)
MONOCYTES # BLD: 0.6 K/UL (ref 0–1)
MONOCYTES NFR BLD: 10 % (ref 5–13)
NEUTS SEG # BLD: 3.9 K/UL (ref 1.8–8)
NEUTS SEG NFR BLD: 59 % (ref 32–75)
NRBC # BLD: 0 K/UL (ref 0–0.01)
NRBC BLD-RTO: 0 PER 100 WBC
PLATELET # BLD AUTO: 390 K/UL (ref 150–400)
PMV BLD AUTO: 9.5 FL (ref 8.9–12.9)
POTASSIUM SERPL-SCNC: 4.3 MMOL/L (ref 3.5–5.1)
PROT SERPL-MCNC: 7.6 G/DL (ref 6.4–8.2)
RBC # BLD AUTO: 4.2 M/UL (ref 3.8–5.2)
SODIUM SERPL-SCNC: 140 MMOL/L (ref 136–145)
TRIGL SERPL-MCNC: 334 MG/DL (ref ?–150)
TSH SERPL DL<=0.05 MIU/L-ACNC: 2.08 UIU/ML (ref 0.36–3.74)
VIT B12 SERPL-MCNC: 1081 PG/ML (ref 193–986)
VLDLC SERPL CALC-MCNC: 66.8 MG/DL
WBC # BLD AUTO: 6.7 K/UL (ref 3.6–11)

## 2021-11-01 PROCEDURE — G8432 DEP SCR NOT DOC, RNG: HCPCS | Performed by: INTERNAL MEDICINE

## 2021-11-01 PROCEDURE — G0463 HOSPITAL OUTPT CLINIC VISIT: HCPCS | Performed by: INTERNAL MEDICINE

## 2021-11-01 PROCEDURE — 99214 OFFICE O/P EST MOD 30 MIN: CPT | Performed by: INTERNAL MEDICINE

## 2021-11-01 PROCEDURE — G8399 PT W/DXA RESULTS DOCUMENT: HCPCS | Performed by: INTERNAL MEDICINE

## 2021-11-01 PROCEDURE — 1100F PTFALLS ASSESS-DOCD GE2>/YR: CPT | Performed by: INTERNAL MEDICINE

## 2021-11-01 PROCEDURE — 3288F FALL RISK ASSESSMENT DOCD: CPT | Performed by: INTERNAL MEDICINE

## 2021-11-01 PROCEDURE — G8427 DOCREV CUR MEDS BY ELIG CLIN: HCPCS | Performed by: INTERNAL MEDICINE

## 2021-11-01 PROCEDURE — G0439 PPPS, SUBSEQ VISIT: HCPCS | Performed by: INTERNAL MEDICINE

## 2021-11-01 PROCEDURE — G8536 NO DOC ELDER MAL SCRN: HCPCS | Performed by: INTERNAL MEDICINE

## 2021-11-01 PROCEDURE — 1090F PRES/ABSN URINE INCON ASSESS: CPT | Performed by: INTERNAL MEDICINE

## 2021-11-01 PROCEDURE — G8419 CALC BMI OUT NRM PARAM NOF/U: HCPCS | Performed by: INTERNAL MEDICINE

## 2021-11-01 RX ORDER — DOXEPIN HYDROCHLORIDE 10 MG/1
10 CAPSULE ORAL
Qty: 30 CAPSULE | Refills: 1 | Status: SHIPPED | OUTPATIENT
Start: 2021-11-01 | End: 2022-01-06

## 2021-11-01 NOTE — PATIENT INSTRUCTIONS
Please titrate off the sonata by taking 1 tablet every other night for 6 nights and then 1 tablet every third night for 4 nights and then stop it  This titration is to avoid withdrawal symptoms  Please start the new medication doxepin in 2 weeks    Medicare Wellness Visit, Female     The best way to live healthy is to have a lifestyle where you eat a well-balanced diet, exercise regularly, limit alcohol use, and quit all forms of tobacco/nicotine, if applicable. Regular preventive services are another way to keep healthy. Preventive services (vaccines, screening tests, monitoring & exams) can help personalize your care plan, which helps you manage your own care. Screening tests can find health problems at the earliest stages, when they are easiest to treat. [facility] follows the current, evidence-based guidelines published by the [facility]wang ([facility]) when recommending preventive services for our patients. Because we follow these guidelines, sometimes recommendations change over time as research supports it. (For example, mammograms used to be recommended annually. Even though Medicare will still pay for an annual mammogram, the newer guidelines recommend a mammogram every two years for women of average risk). Of course, you and your doctor may decide to screen more often for some diseases, based on your risk and your co-morbidities (chronic disease you are already diagnosed with). Preventive services for you include:  - Medicare offers their members a free annual wellness visit, which is time for you and your primary care provider to discuss and plan for your preventive service needs. Take advantage of this benefit every year!  -All adults over the age of 72 should receive the recommended pneumonia vaccines.  Current USPSTF guidelines recommend a series of two vaccines for the best pneumonia protection.   -All adults should have a flu vaccine yearly and a tetanus vaccine every 10 years.   -All adults age 48 and older should receive the shingles vaccines (series of two vaccines). -All adults age 38-68 who are overweight should have a diabetes screening test once every three years.   -All adults born between [birth year] and 1963 should be screened once for Hepatitis C.  -Other screening tests and preventive services for persons with diabetes include: an eye exam to screen for diabetic retinopathy, a kidney function test, a foot exam, and stricter control over your cholesterol.   -Cardiovascular screening for adults with routine risk involves an electrocardiogram (ECG) at intervals determined by your doctor.   -Colorectal cancer screenings should be done for adults age 54-65 with no increased risk factors for colorectal cancer. There are a number of acceptable methods of screening for this type of cancer. Each test has its own benefits and drawbacks. Discuss with your doctor what is most appropriate for you during your annual wellness visit. The different tests include: colonoscopy (considered the best screening method), a fecal occult blood test, a fecal DNA test, and sigmoidoscopy.    -A bone mass density test is recommended when a woman turns 65 to screen for osteoporosis. This test is only recommended one time, as a screening. Some providers will use this same test as a disease monitoring tool if you already have osteoporosis. -Breast cancer screenings are recommended every other year for women of normal risk, age 54-69.  -Cervical cancer screenings for women over age 72 are only recommended with certain risk factors.      Here is a list of your current Health Maintenance items (your personalized list of preventive services) with a due date:  Health Maintenance Due   Topic Date Due    Hepatitis C Test  Never done    Shingles Vaccine (1 of 2) Never done

## 2021-11-01 NOTE — PROGRESS NOTES
HISTORY OF PRESENT ILLNESS  Jeni Murray is a 80 y.o. female. HPI  Seen for wellness review and med check. Issues:  1. Chronic constipation. Carlitos Flagr works well for her. 2. Chronic insomnia. Himanshu Ghotra is not working and I am concerned about risk of falls and dementia. She has been taking it every night. We discussed titrating off over a two week time period and trial of Doxepin in place. 3. Neuropathy, chemo induced. Did see neurology. Really does not have any new options. Is currently taking 300 of Gabapentin, as well as Cymbalta. 4. Preventive. She declines a bone density. Encouraged Shingrix vaccine. Review of Systems   Constitutional: Negative for chills, fever and weight loss. HENT: Negative for hearing loss. Eyes: Negative for blurred vision. Respiratory: Negative for cough, shortness of breath and wheezing. Cardiovascular: Negative for chest pain, palpitations, orthopnea, leg swelling and PND. Gastrointestinal: Negative for abdominal pain, heartburn, nausea and vomiting. Musculoskeletal: Positive for falls. Negative for myalgias. Neurological: Positive for dizziness. Negative for focal weakness and headaches. Psychiatric/Behavioral: Negative for depression. The patient has insomnia. Physical Exam  Vitals and nursing note reviewed. Constitutional:       Appearance: She is well-developed. HENT:      Head: Normocephalic and atraumatic. Right Ear: Tympanic membrane normal.      Left Ear: Tympanic membrane normal.   Neck:      Thyroid: No thyromegaly. Vascular: No carotid bruit. Cardiovascular:      Rate and Rhythm: Normal rate and regular rhythm. Heart sounds: Normal heart sounds, S1 normal and S2 normal. No murmur heard. Pulmonary:      Effort: Pulmonary effort is normal. No respiratory distress. Breath sounds: Normal breath sounds. No wheezing or rales. Musculoskeletal:      Cervical back: Normal range of motion and neck supple. Neurological:      Mental Status: She is alert and oriented to person, place, and time. Psychiatric:         Behavior: Behavior normal.         ASSESSMENT and PLAN  Diagnoses and all orders for this visit:    1. Medicare annual wellness visit, subsequent    2. HTN, goal below 777/53  -     METABOLIC PANEL, COMPREHENSIVE; Future  -     LIPID PANEL; Future    3. Adjustment insomnia  -     doxepin (SINEquan) 10 mg capsule; Take 1 Capsule by mouth nightly. 4. Chemotherapy-induced peripheral neuropathy (HCC)  -     TSH 3RD GENERATION; Future  -     VITAMIN B12; Future  -     HEPATITIS C AB; Future    5. Malignant neoplasm of central portion of left female breast, unspecified estrogen receptor status ([facility]  -     CBC WITH AUTOMATED DIFF; Future    6. Chronic constipation      the following changes in treatment are made: titrate off the sonata and then trial of doxepin  appt in 1mo  This is the Subsequent Medicare Annual Wellness Exam, performed 12 months or more after the Initial AWV or the last Subsequent AWV    I have reviewed the patient's medical history in detail and updated the computerized patient record. Assessment/Plan   Education and counseling provided:  Are appropriate based on today's review and evaluation  Influenza Vaccine  Screening Mammography  Bone mass measurement (DEXA)    1. Medicare annual wellness visit, subsequent  2. HTN, goal below 097/62  -     METABOLIC PANEL, COMPREHENSIVE; Future  -     LIPID PANEL; Future  3. Adjustment insomnia  -     doxepin (SINEquan) 10 mg capsule; Take 1 Capsule by mouth nightly., Normal, Disp-30 Capsule, R-1  4. Chemotherapy-induced peripheral neuropathy (HCC)  -     TSH 3RD GENERATION; Future  -     VITAMIN B12; Future  -     HEPATITIS C AB; Future  5. Malignant neoplasm of central portion of left female breast, unspecified estrogen receptor status ([facility]  -     CBC WITH AUTOMATED DIFF; Future  6.  Chronic constipation       Depression Risk Factor Screening 3 most recent PHQ Screens 6/22/2021   Little interest or pleasure in doing things Not at all   Feeling down, depressed, irritable, or hopeless Not at all   Total Score PHQ 2 0       Alcohol Risk Screen    Do you average more than 1 drink per night or more than 7 drinks a week:  No    On any one occasion in the past three months have you have had more than 3 drinks containing alcohol:  No        Functional Ability and Level of Safety    Hearing: Hearing is good. Activities of Daily Living: The home contains: no safety equipment. Patient does total self care      Ambulation: with no difficulty     Fall Risk:  Fall Risk Assessment, last 12 mths 6/22/2021   Able to walk? Yes   Fall in past 12 months? 0   Number of falls in past 12 months -   Fall with injury?  -      Abuse Screen:  Patient is not abused       Cognitive Screening    Has your family/caregiver stated any concerns about your memory: no     Cognitive Screening: Normal - Verbal Fluency Test    Health Maintenance Due     Health Maintenance Due   Topic Date Due    Hepatitis C Screening  Never done    Shingrix Vaccine Age 49> (1 of 2) Never done       Patient Care Team   Patient Care Team:  Madisyn Moran MD as PCP - General (Internal Medicine)  Madisyn Moran MD as PCP - [address] [place], [place] [address]lizzy Gale Provider    History     Patient Active Problem List   Diagnosis Code    Malignant neoplasm of left female breast [facility]) C50.912    Dyslipidemia, goal LDL below 100 E78.5     Past Medical History:   Diagnosis Date    Arthritis     Breast cancer ([facility] 2015     left triple neg    Constipation     Irritable bowel syndrome (IBS)     Lymphedema     left arm    Mitral prolapse     Neurological disorder     chemotherapy induced neuropathy    Radiation therapy complication 1198    left breast ca      Past Surgical History:   Procedure Laterality Date    COLONOSCOPY N/A 5/31/2018    COLONOSCOPY performed by Eddi Quijano MD at [address] [place] [address] COLONOSCOPY,DIAGNOSTIC  5/31/2018         HX BREAST BIOPSY Left yrs    neg; stereotactic    HX BREAST LUMPECTOMY Left 2015    HX BREAST REDUCTION Bilateral yrs ago    HX CHOLECYSTECTOMY      HX COLONOSCOPY      HX GYN      Hysterectomy    HX ORTHOPAEDIC Right     surgery on the ankle     Current Outpatient Medications   Medication Sig Dispense Refill    doxepin (SINEquan) 10 mg capsule Take 1 Capsule by mouth nightly. 30 Capsule 1    omeprazole (PRILOSEC) 20 mg capsule TAKE 1 CAPSULE BY MOUTH ONCE DAILY 30 Capsule 0    DULoxetine (CYMBALTA) 60 mg capsule Take 1 Capsule by mouth daily. Take with the 30 mg to equal 90 mg daily 30 Capsule 0    omeprazole (PRILOSEC) 20 mg capsule Take 1 Capsule by mouth daily. 30 Capsule 0    lisinopriL (PRINIVIL, ZESTRIL) 2.5 mg tablet TAKE 1 TABLET BY MOUTH EVERY DAY 90 Tablet 0    meloxicam (MOBIC) 15 mg tablet TAKE 1 TABLET BY MOUTH EVERY DAY 90 Tablet 0    gabapentin (NEURONTIN) 100 mg capsule 1 po qam and 2 po qhs 90 Capsule 0    DULoxetine (CYMBALTA) 30 mg capsule TAKE ONE CAPSULE BY MOUTH DAILY *TAKE WITH 60MG DOSE FOR TOTAL OF 90MG PER DAY* 30 Capsule 0    linaCLOtide (Linzess) 290 mcg cap capsule Take 1 Capsule by mouth daily.  90 Capsule 0     No Known Allergies    Family History   Problem Relation Age of Onset    Breast Cancer Maternal Grandmother     Heart Disease Mother     Heart Disease Father      Social History     Tobacco Use    Smoking status: Former Smoker    Smokeless tobacco: Never Used   Substance Use Topics    Alcohol use: No         John Estrada MD

## 2021-11-04 DIAGNOSIS — T45.1X5A CHEMOTHERAPY-INDUCED PERIPHERAL NEUROPATHY (HCC): ICD-10-CM

## 2021-11-04 DIAGNOSIS — K21.9 GASTROESOPHAGEAL REFLUX DISEASE WITHOUT ESOPHAGITIS: ICD-10-CM

## 2021-11-04 DIAGNOSIS — F41.9 ANXIETY: ICD-10-CM

## 2021-11-04 DIAGNOSIS — G62.0 CHEMOTHERAPY-INDUCED PERIPHERAL NEUROPATHY (HCC): ICD-10-CM

## 2021-11-04 RX ORDER — GABAPENTIN 100 MG/1
CAPSULE ORAL
Qty: 90 CAPSULE | Refills: 5 | Status: SHIPPED | OUTPATIENT
Start: 2021-11-04 | End: 2022-04-06

## 2021-11-04 RX ORDER — DULOXETIN HYDROCHLORIDE 30 MG/1
30 CAPSULE, DELAYED RELEASE ORAL DAILY
Qty: 90 CAPSULE | Refills: 1 | Status: SHIPPED | OUTPATIENT
Start: 2021-11-04 | End: 2022-01-12 | Stop reason: DRUGHIGH

## 2021-11-04 RX ORDER — OMEPRAZOLE 20 MG/1
20 CAPSULE, DELAYED RELEASE ORAL DAILY
Qty: 90 CAPSULE | Refills: 1 | Status: SHIPPED | OUTPATIENT
Start: 2021-11-04 | End: 2022-05-10

## 2021-11-04 NOTE — PROGRESS NOTES
V/m left for patient notifying of increase in chol & glucose levels. Encouraged she make some dietary changes & PCP will plan to repeat these levels fasting at her next appt. Notified other labs are stable.

## 2021-11-04 NOTE — PROGRESS NOTES
Please notify her that her cholesterol and glucose are up from our prior checks-can she work on dietary modification and then we should repeat  levels fasting at our next visit in jan-the other labs are stable

## 2021-11-26 DIAGNOSIS — G62.9 NEUROPATHY: ICD-10-CM

## 2021-11-28 RX ORDER — MELOXICAM 15 MG/1
TABLET ORAL
Qty: 90 TABLET | Refills: 0 | Status: SHIPPED | OUTPATIENT
Start: 2021-11-28 | End: 2022-03-30

## 2021-11-28 RX ORDER — DULOXETIN HYDROCHLORIDE 60 MG/1
CAPSULE, DELAYED RELEASE ORAL
Qty: 30 CAPSULE | Refills: 0 | Status: SHIPPED | OUTPATIENT
Start: 2021-11-28 | End: 2021-12-23

## 2021-12-23 DIAGNOSIS — G62.9 NEUROPATHY: ICD-10-CM

## 2021-12-23 RX ORDER — DULOXETIN HYDROCHLORIDE 60 MG/1
CAPSULE, DELAYED RELEASE ORAL
Qty: 30 CAPSULE | Refills: 0 | Status: SHIPPED | OUTPATIENT
Start: 2021-12-23 | End: 2022-01-15

## 2021-12-31 DIAGNOSIS — I51.7 LVH (LEFT VENTRICULAR HYPERTROPHY): ICD-10-CM

## 2021-12-31 RX ORDER — LISINOPRIL 2.5 MG/1
TABLET ORAL
Qty: 90 TABLET | Refills: 0 | Status: SHIPPED | OUTPATIENT
Start: 2021-12-31 | End: 2022-03-30

## 2022-01-06 DIAGNOSIS — F51.02 ADJUSTMENT INSOMNIA: ICD-10-CM

## 2022-01-06 RX ORDER — DOXEPIN HYDROCHLORIDE 10 MG/1
CAPSULE ORAL
Qty: 30 CAPSULE | Refills: 1 | Status: SHIPPED | OUTPATIENT
Start: 2022-01-06 | End: 2022-01-10 | Stop reason: SDUPTHER

## 2022-01-06 RX ORDER — ZALEPLON 10 MG/1
10 CAPSULE ORAL
Qty: 25 CAPSULE | Refills: 0 | OUTPATIENT
Start: 2022-01-06

## 2022-01-10 DIAGNOSIS — F51.02 ADJUSTMENT INSOMNIA: ICD-10-CM

## 2022-01-10 RX ORDER — DOXEPIN HYDROCHLORIDE 10 MG/1
CAPSULE ORAL
Qty: 30 CAPSULE | Refills: 0 | Status: SHIPPED | OUTPATIENT
Start: 2022-01-10 | End: 2022-01-12 | Stop reason: SDUPTHER

## 2022-01-12 ENCOUNTER — VIRTUAL VISIT (OUTPATIENT)
Dept: INTERNAL MEDICINE CLINIC | Age: 82
End: 2022-01-12
Payer: MEDICARE

## 2022-01-12 DIAGNOSIS — R73.01 IFG (IMPAIRED FASTING GLUCOSE): ICD-10-CM

## 2022-01-12 DIAGNOSIS — F41.9 ANXIETY: ICD-10-CM

## 2022-01-12 DIAGNOSIS — I10 HTN, GOAL BELOW 130/80: ICD-10-CM

## 2022-01-12 DIAGNOSIS — G62.0 CHEMOTHERAPY-INDUCED PERIPHERAL NEUROPATHY (HCC): ICD-10-CM

## 2022-01-12 DIAGNOSIS — C50.112 MALIGNANT NEOPLASM OF CENTRAL PORTION OF LEFT FEMALE BREAST, UNSPECIFIED ESTROGEN RECEPTOR STATUS (HCC): ICD-10-CM

## 2022-01-12 DIAGNOSIS — T45.1X5A CHEMOTHERAPY-INDUCED PERIPHERAL NEUROPATHY (HCC): ICD-10-CM

## 2022-01-12 DIAGNOSIS — F51.04 CHRONIC INSOMNIA: Primary | ICD-10-CM

## 2022-01-12 PROCEDURE — G8756 NO BP MEASURE DOC: HCPCS | Performed by: INTERNAL MEDICINE

## 2022-01-12 PROCEDURE — G8419 CALC BMI OUT NRM PARAM NOF/U: HCPCS | Performed by: INTERNAL MEDICINE

## 2022-01-12 PROCEDURE — G8399 PT W/DXA RESULTS DOCUMENT: HCPCS | Performed by: INTERNAL MEDICINE

## 2022-01-12 PROCEDURE — 1090F PRES/ABSN URINE INCON ASSESS: CPT | Performed by: INTERNAL MEDICINE

## 2022-01-12 PROCEDURE — G8427 DOCREV CUR MEDS BY ELIG CLIN: HCPCS | Performed by: INTERNAL MEDICINE

## 2022-01-12 PROCEDURE — 99214 OFFICE O/P EST MOD 30 MIN: CPT | Performed by: INTERNAL MEDICINE

## 2022-01-12 PROCEDURE — G0463 HOSPITAL OUTPT CLINIC VISIT: HCPCS | Performed by: INTERNAL MEDICINE

## 2022-01-12 PROCEDURE — G8510 SCR DEP NEG, NO PLAN REQD: HCPCS | Performed by: INTERNAL MEDICINE

## 2022-01-12 PROCEDURE — 1101F PT FALLS ASSESS-DOCD LE1/YR: CPT | Performed by: INTERNAL MEDICINE

## 2022-01-12 PROCEDURE — G8536 NO DOC ELDER MAL SCRN: HCPCS | Performed by: INTERNAL MEDICINE

## 2022-01-12 RX ORDER — DOXEPIN HYDROCHLORIDE 10 MG/1
CAPSULE ORAL
Qty: 30 CAPSULE | Refills: 3 | Status: SHIPPED | OUTPATIENT
Start: 2022-01-12 | End: 2022-06-15 | Stop reason: ALTCHOICE

## 2022-01-12 NOTE — PROGRESS NOTES
Consent: Josh Anderson, who was seen by synchronous (real-time) audio-video technology, and/or her healthcare decision maker, is aware that this patient-initiated, Telehealth encounter on 1/12/2022 is a billable service, with coverage as determined by her insurance carrier. She is aware that she may receive a bill and has provided verbal consent to proceed: YES  712  Subjective:   Josh Anderson is a 80 y.o. female who was seen for Medication Evaluation (Patient states the Doxepin hasnt helped her with sleeping but it does keep her anxiety down. )      Part of visit done via Doxy by phone. Her  has been diagnosed with a lung mass and is in the middle of a workup. She feels Doxepin does help with her anxiety. She is still having trouble sleeping. This has been a chronic problem. Previous sedative hypnotics never helped and I am leery of using those in a 80year-old. She does note increased appetite and I am worried about blood sugars, which were elevated in November. Will drop Cymbalta from 90 to 60, continue Doxepin at 10, work on decreasing sweets. See me in one month. Current Outpatient Medications   Medication Sig    doxepin (SINEquan) 10 mg capsule TAKE 1 CAPSULE BY MOUTH EVERY DAY AT NIGHT    lisinopriL (PRINIVIL, ZESTRIL) 2.5 mg tablet TAKE 1 TABLET BY MOUTH EVERY DAY    DULoxetine (CYMBALTA) 60 mg capsule TAKE 1 CAPSULE BY MOUTH EVERY DAY WITH 30 MG CAPSULE    meloxicam (MOBIC) 15 mg tablet TAKE 1 TABLET BY MOUTH EVERY DAY    omeprazole (PRILOSEC) 20 mg capsule Take 1 Capsule by mouth daily.  gabapentin (NEURONTIN) 100 mg capsule 1 po qam and 2 po qhs    omeprazole (PRILOSEC) 20 mg capsule TAKE 1 CAPSULE BY MOUTH ONCE DAILY    linaCLOtide (Linzess) 290 mcg cap capsule Take 1 Capsule by mouth daily. No current facility-administered medications for this visit.        No Known Allergies    Past Medical History:   Diagnosis Date    Arthritis     Breast cancer ([facility] 2015 left triple neg    Constipation     Irritable bowel syndrome (IBS)     Lymphedema     left arm    Mitral prolapse     Neurological disorder     chemotherapy induced neuropathy    Radiation therapy complication 0737    left breast ca       ROS  All other systems reviewed and negative, unless mentioned in HPI    Objective:   Vital Signs: (As obtained by patient/caregiver at home)  There were no vitals taken for this visit. [INSTRUCTIONS:  \"[x]\" Indicates a positive item  \"[]\" Indicates a negative item  -- DELETE ALL ITEMS NOT EXAMINED]    Constitutional: [x] Appears well-developed and well-nourished [x] No apparent distress      [] Abnormal -     Mental status: [x] Alert and awake  [x] Oriented to person/place/time [x] Able to follow commands    [] Abnormal -     Eyes:   EOM    [x]  Normal    [] Abnormal -   Sclera  [x]  Normal    [] Abnormal -          Discharge [x]  None visible   [] Abnormal -     HENT: [x] Normocephalic, atraumatic  [] Abnormal -       External Ears [x] Normal  [] Abnormal -    Neck: [x] No visualized mass [] Abnormal -     Pulmonary/Chest: [x] Respiratory effort normal   [x] No visualized signs of difficulty breathing or respiratory distress        [] Abnormal -      Musculoskeletal:            [x] Normal range of motion of neck        [] Abnormal -     Neurological:        [x] No Facial Asymmetry (Cranial nerve 7 motor function) (limited exam due to video visit)          [x] No gaze palsy        [] Abnormal -          Skin:        [x] No significant exanthematous lesions or discoloration noted on facial skin         [] Abnormal -            Psychiatric:       [x] Normal Affect [] Abnormal -           Other pertinent observable physical exam findings:-        Assessment & Plan:   Diagnoses and all orders for this visit:    1. Chronic insomnia  -     doxepin (SINEquan) 10 mg capsule; TAKE 1 CAPSULE BY MOUTH EVERY DAY AT NIGHT    2. Chemotherapy-induced peripheral neuropathy ([address]    3. Malignant neoplasm of central portion of left female breast, unspecified estrogen receptor status ([facility]    4. Anxiety    5. HTN, goal below 130/80    6. IFG (impaired fasting glucose)    The patient is asked to make an attempt to improve diet and exercise patterns to aid in medical management of this problem. appt in 1 mo fasting        We discussed the expected course, resolution and complications of the diagnosis(es) in detail. Medication risks, benefits, costs, interactions, and alternatives were discussed as indicated. I advised her to contact the office if her condition worsens, changes or fails to improve as anticipated. She expressed understanding with the diagnosis(es) and plan. Derrick Spencer is a 80 y.o. female being evaluated by a video visit encounter for concerns as above. A caregiver was present when appropriate. Due to this being a TeleHealth encounter (During PTCEX-64 public health emergency), evaluation of the following organ systems was limited: Vitals/Constitutional/EENT/Resp/CV/GI//MS/Neuro/Skin/Heme-Lymph-Imm. Pursuant to the emergency declaration under the [facility] waiver authority and the [employer] and [employer] Act, this Virtual  Visit was conducted, with patient's (and/or legal guardian's) consent, to reduce the patient's risk of exposure to COVID-19 and provide necessary medical care. Services were provided through a video synchronous discussion virtually to substitute for in-person clinic visit. Patient and provider were located at their individual homes.

## 2022-01-15 DIAGNOSIS — G62.9 NEUROPATHY: ICD-10-CM

## 2022-01-15 RX ORDER — DULOXETIN HYDROCHLORIDE 60 MG/1
CAPSULE, DELAYED RELEASE ORAL
Qty: 30 CAPSULE | Refills: 3 | Status: SHIPPED | OUTPATIENT
Start: 2022-01-15 | End: 2022-05-31

## 2022-01-21 ENCOUNTER — TELEPHONE (OUTPATIENT)
Dept: INTERNAL MEDICINE CLINIC | Age: 82
End: 2022-01-21

## 2022-01-26 ENCOUNTER — TELEPHONE (OUTPATIENT)
Dept: INTERNAL MEDICINE CLINIC | Age: 82
End: 2022-01-26

## 2022-01-26 NOTE — TELEPHONE ENCOUNTER
Pt left a form for the doctor to complete and I placed it in the doctor's mailbox. Please call her home or mobile if you have any questions.       Thank you

## 2022-01-28 ENCOUNTER — TELEPHONE (OUTPATIENT)
Dept: INTERNAL MEDICINE CLINIC | Age: 82
End: 2022-01-28

## 2022-01-28 NOTE — TELEPHONE ENCOUNTER
Patient's  came to pick- up Application Form / Prior Auth for Rx Linzess Form 01/28/2022. Form had been submitted to In House Scanning on 01/27 and Document was scanned into the chart. Patient's  received a reprint copy  of the form from patient's chart. However, in the case of the patient sending the  back for the original copy, so to speak. It has been located from the [employer] bin, and placed in the patient  folder.  Thank- you Discharged

## 2022-03-18 ENCOUNTER — TELEPHONE (OUTPATIENT)
Dept: INTERNAL MEDICINE CLINIC | Age: 82
End: 2022-03-18

## 2022-03-19 PROBLEM — E78.5 DYSLIPIDEMIA, GOAL LDL BELOW 100: Status: ACTIVE | Noted: 2017-05-09

## 2022-03-19 PROBLEM — C50.912 MALIGNANT NEOPLASM OF LEFT FEMALE BREAST (HCC): Status: ACTIVE | Noted: 2017-05-09

## 2022-03-30 DIAGNOSIS — I51.7 LVH (LEFT VENTRICULAR HYPERTROPHY): ICD-10-CM

## 2022-03-30 RX ORDER — MELOXICAM 15 MG/1
TABLET ORAL
Qty: 90 TABLET | Refills: 0 | Status: SHIPPED | OUTPATIENT
Start: 2022-03-30 | End: 2022-04-06 | Stop reason: ALTCHOICE

## 2022-03-30 RX ORDER — LISINOPRIL 2.5 MG/1
TABLET ORAL
Qty: 90 TABLET | Refills: 0 | Status: SHIPPED | OUTPATIENT
Start: 2022-03-30 | End: 2022-04-27

## 2022-04-06 ENCOUNTER — OFFICE VISIT (OUTPATIENT)
Dept: INTERNAL MEDICINE CLINIC | Age: 82
End: 2022-04-06
Payer: MEDICARE

## 2022-04-06 VITALS
OXYGEN SATURATION: 95 % | WEIGHT: 141.4 LBS | SYSTOLIC BLOOD PRESSURE: 112 MMHG | HEART RATE: 94 BPM | BODY MASS INDEX: 26.7 KG/M2 | HEIGHT: 61 IN | DIASTOLIC BLOOD PRESSURE: 70 MMHG | RESPIRATION RATE: 16 BRPM

## 2022-04-06 DIAGNOSIS — G62.0 CHEMOTHERAPY-INDUCED PERIPHERAL NEUROPATHY (HCC): ICD-10-CM

## 2022-04-06 DIAGNOSIS — T45.1X5A CHEMOTHERAPY-INDUCED PERIPHERAL NEUROPATHY (HCC): ICD-10-CM

## 2022-04-06 DIAGNOSIS — F41.9 ANXIETY: ICD-10-CM

## 2022-04-06 DIAGNOSIS — F51.04 CHRONIC INSOMNIA: ICD-10-CM

## 2022-04-06 DIAGNOSIS — I51.7 LVH (LEFT VENTRICULAR HYPERTROPHY): Primary | ICD-10-CM

## 2022-04-06 PROCEDURE — G8510 SCR DEP NEG, NO PLAN REQD: HCPCS | Performed by: INTERNAL MEDICINE

## 2022-04-06 PROCEDURE — 99214 OFFICE O/P EST MOD 30 MIN: CPT | Performed by: INTERNAL MEDICINE

## 2022-04-06 PROCEDURE — 1090F PRES/ABSN URINE INCON ASSESS: CPT | Performed by: INTERNAL MEDICINE

## 2022-04-06 PROCEDURE — G8399 PT W/DXA RESULTS DOCUMENT: HCPCS | Performed by: INTERNAL MEDICINE

## 2022-04-06 PROCEDURE — G8419 CALC BMI OUT NRM PARAM NOF/U: HCPCS | Performed by: INTERNAL MEDICINE

## 2022-04-06 PROCEDURE — G8752 SYS BP LESS 140: HCPCS | Performed by: INTERNAL MEDICINE

## 2022-04-06 PROCEDURE — G8754 DIAS BP LESS 90: HCPCS | Performed by: INTERNAL MEDICINE

## 2022-04-06 PROCEDURE — G8427 DOCREV CUR MEDS BY ELIG CLIN: HCPCS | Performed by: INTERNAL MEDICINE

## 2022-04-06 PROCEDURE — G8536 NO DOC ELDER MAL SCRN: HCPCS | Performed by: INTERNAL MEDICINE

## 2022-04-06 PROCEDURE — 1101F PT FALLS ASSESS-DOCD LE1/YR: CPT | Performed by: INTERNAL MEDICINE

## 2022-04-06 PROCEDURE — G0463 HOSPITAL OUTPT CLINIC VISIT: HCPCS | Performed by: INTERNAL MEDICINE

## 2022-04-06 RX ORDER — GABAPENTIN 100 MG/1
CAPSULE ORAL
Qty: 120 CAPSULE | Refills: 3 | Status: SHIPPED | OUTPATIENT
Start: 2022-04-06 | End: 2022-10-30

## 2022-04-06 NOTE — PROGRESS NOTES
HISTORY OF PRESENT ILLNESS  Funmilayo Lancaster is a 80 y.o. female. HPI  In for med check. She would like to minimize her pills. We went through all of them. Wishes to have the meloxicam.  Continue lisinopril 2.5 given LVH and previous hypertensive episodes. Continue omeprazole and Linzess. Continue duloxetine 60 with Doxepin 10 mg which has helped with her anxiety. She continues to complain of chronic insomnia and wants Sonata, but I am reluctant to use this because of fall risk. In fact she endorses multiple falls in the fall. Discussed and will bump gabapentin to 300 mg at night, 100 mg in the morning and encouraged consult with Psychiatry to discuss other meds.  is now being treated for metastatic lung cancer, but responding well. I suggest do not stop her Doxepin and duloxetine at this time. Review of Systems   Constitutional: Negative for chills, fever, malaise/fatigue and weight loss. Respiratory: Negative for cough, shortness of breath and wheezing. Cardiovascular: Positive for leg swelling. Negative for chest pain, palpitations, orthopnea and PND. Gastrointestinal: Negative for heartburn and nausea. Musculoskeletal: Positive for falls. Negative for myalgias. Neurological: Negative for dizziness and headaches. Psychiatric/Behavioral: Negative for depression. The patient has insomnia. Physical Exam  Vitals and nursing note reviewed. Constitutional:       Appearance: She is well-developed. HENT:      Head: Normocephalic and atraumatic. Neck:      Thyroid: No thyromegaly. Vascular: No carotid bruit. Cardiovascular:      Rate and Rhythm: Normal rate and regular rhythm. Heart sounds: Normal heart sounds, S1 normal and S2 normal. No murmur heard. Pulmonary:      Effort: Pulmonary effort is normal. No respiratory distress. Breath sounds: Normal breath sounds. No wheezing or rales.    Musculoskeletal:      Cervical back: Normal range of motion and neck supple. Neurological:      Mental Status: She is alert and oriented to person, place, and time. Psychiatric:         Behavior: Behavior normal.         ASSESSMENT and PLAN  Diagnoses and all orders for this visit:    1. LVH (left ventricular hypertrophy)    2. Chemotherapy-induced peripheral neuropathy (HCC)  -     gabapentin (NEURONTIN) 100 mg capsule; 1 po qam and 3 po qhs    3. Anxiety    4.  Chronic insomnia      the following changes in treatment are made: inc from 300 to 400 mg gabapentin total   Stop mobic  See psychiatry to discuss chronic insomnia  Cont doxepin 10 and duloxetine 60 mg dose  appt in 3mo

## 2022-04-14 ENCOUNTER — NURSE TRIAGE (OUTPATIENT)
Dept: OTHER | Facility: CLINIC | Age: 82
End: 2022-04-14

## 2022-04-14 NOTE — TELEPHONE ENCOUNTER
Received call from Dalton Ruby  at [facility] with Red Flag Complaint. Subjective: Caller states \"high blood pressure\"     Current Symptoms: has been having high blood pressure readings:  4/13 158/97  HR 86  4/14  154/96  HR 92  Had 2 massages in 2 days   Multiple family members visiting;   diagnosed with cancer;  Saw MD Monday of last week for annual exam; Stopped Meloxicam   Denies other symptoms    Onset: 1 day ago; sudden    Associated Symptoms: NA    Pain Severity: 0/10; Temperature: denies    What has been tried: Took bp meds as prescribed;    LMP: NA Pregnant: NA    Recommended disposition: See in Office Within 2 Weeks    Care advice provided, patient verbalizes understanding; denies any other questions or concerns; instructed to call back for any new or worsening symptoms. Patient/Caller agrees with recommended disposition; writer provided warm transfer to Clancy at [facility] for appointment scheduling    Attention Provider: Thank you for allowing me to participate in the care of your patient. The patient was connected to triage in response to information provided to the [facility]. Please do not respond through this encounter as the response is not directed to a shared pool.     Reason for Disposition   Systolic BP >= 856 OR Diastolic >= 80, and is taking BP medications    Protocols used: BLOOD PRESSURE - HIGH-ADULT-OH

## 2022-04-27 ENCOUNTER — OFFICE VISIT (OUTPATIENT)
Dept: INTERNAL MEDICINE CLINIC | Age: 82
End: 2022-04-27
Payer: MEDICARE

## 2022-04-27 VITALS
HEART RATE: 91 BPM | BODY MASS INDEX: 26.66 KG/M2 | SYSTOLIC BLOOD PRESSURE: 132 MMHG | HEIGHT: 61 IN | WEIGHT: 141.2 LBS | OXYGEN SATURATION: 98 % | TEMPERATURE: 97.9 F | DIASTOLIC BLOOD PRESSURE: 78 MMHG | RESPIRATION RATE: 16 BRPM

## 2022-04-27 DIAGNOSIS — I10 HTN, GOAL BELOW 140/80: Primary | ICD-10-CM

## 2022-04-27 DIAGNOSIS — F51.04 CHRONIC INSOMNIA: ICD-10-CM

## 2022-04-27 DIAGNOSIS — G62.0 CHEMOTHERAPY-INDUCED PERIPHERAL NEUROPATHY (HCC): ICD-10-CM

## 2022-04-27 DIAGNOSIS — I51.7 LVH (LEFT VENTRICULAR HYPERTROPHY): ICD-10-CM

## 2022-04-27 DIAGNOSIS — T45.1X5A CHEMOTHERAPY-INDUCED PERIPHERAL NEUROPATHY (HCC): ICD-10-CM

## 2022-04-27 PROCEDURE — G8754 DIAS BP LESS 90: HCPCS | Performed by: INTERNAL MEDICINE

## 2022-04-27 PROCEDURE — G0463 HOSPITAL OUTPT CLINIC VISIT: HCPCS | Performed by: INTERNAL MEDICINE

## 2022-04-27 PROCEDURE — 1101F PT FALLS ASSESS-DOCD LE1/YR: CPT | Performed by: INTERNAL MEDICINE

## 2022-04-27 PROCEDURE — G8399 PT W/DXA RESULTS DOCUMENT: HCPCS | Performed by: INTERNAL MEDICINE

## 2022-04-27 PROCEDURE — G8510 SCR DEP NEG, NO PLAN REQD: HCPCS | Performed by: INTERNAL MEDICINE

## 2022-04-27 PROCEDURE — G8752 SYS BP LESS 140: HCPCS | Performed by: INTERNAL MEDICINE

## 2022-04-27 PROCEDURE — G8536 NO DOC ELDER MAL SCRN: HCPCS | Performed by: INTERNAL MEDICINE

## 2022-04-27 PROCEDURE — 1090F PRES/ABSN URINE INCON ASSESS: CPT | Performed by: INTERNAL MEDICINE

## 2022-04-27 PROCEDURE — 99214 OFFICE O/P EST MOD 30 MIN: CPT | Performed by: INTERNAL MEDICINE

## 2022-04-27 PROCEDURE — G8419 CALC BMI OUT NRM PARAM NOF/U: HCPCS | Performed by: INTERNAL MEDICINE

## 2022-04-27 PROCEDURE — G8427 DOCREV CUR MEDS BY ELIG CLIN: HCPCS | Performed by: INTERNAL MEDICINE

## 2022-04-27 RX ORDER — LISINOPRIL 2.5 MG/1
2.5 TABLET ORAL 2 TIMES DAILY
Qty: 180 TABLET | Refills: 0 | Status: SHIPPED | OUTPATIENT
Start: 2022-04-27 | End: 2022-05-05

## 2022-04-27 NOTE — PROGRESS NOTES
HISTORY OF PRESENT ILLNESS  Ada Aly is a [de-identified] y.o. female. HPI  Because of fluctuating blood pressure she feels a little tightness in her chest when her blood pressure is up, has been monitoring it regularly. Systolics are ranging [id number], diastolic is ranging . Most recently had a reading of 170/114 yesterday. She feels anxious about her  and continues to struggle with chronic insomnia, has not yet made an appointment with psychiatry. We are using currently Gabapentin, a total of 400 mg in 24 hours, Duloxetine 60 mg and Doxepin 10 mg. Currently on Lisinopril 2.5 daily. Denies dyspnea on exertion or pain with exertion. Review of Systems   Constitutional: Negative for chills, fever and weight loss. Respiratory: Negative for cough, shortness of breath and wheezing. Cardiovascular: Negative for chest pain, palpitations, orthopnea, leg swelling and PND. Gastrointestinal: Negative for heartburn and nausea. Musculoskeletal: Negative for myalgias. Neurological: Negative for dizziness and headaches. Psychiatric/Behavioral: The patient is nervous/anxious and has insomnia. Physical Exam  Vitals and nursing note reviewed. Constitutional:       Appearance: She is well-developed. HENT:      Head: Normocephalic and atraumatic. Neck:      Thyroid: No thyromegaly. Vascular: No carotid bruit. Cardiovascular:      Rate and Rhythm: Normal rate and regular rhythm. Heart sounds: Normal heart sounds, S1 normal and S2 normal. No murmur heard. Pulmonary:      Effort: Pulmonary effort is normal. No respiratory distress. Breath sounds: Normal breath sounds. No wheezing or rales. Musculoskeletal:      Cervical back: Normal range of motion and neck supple. Neurological:      Mental Status: She is alert and oriented to person, place, and time.    Psychiatric:         Behavior: Behavior normal.         ASSESSMENT and PLAN  Diagnoses and all orders for this visit:    1. HTN, goal below 140/80  -     lisinopriL (PRINIVIL, ZESTRIL) 2.5 mg tablet; Take 1 Tablet by mouth two (2) times a day. 2. Chronic insomnia    3. Chemotherapy-induced peripheral neuropathy ([address]    4. LVH (left ventricular hypertrophy)  -     lisinopriL (PRINIVIL, ZESTRIL) 2.5 mg tablet; Take 1 Tablet by mouth two (2) times a day.       the following changes in treatment are made: inc from every day to bid lisinopril  appt in 1mo  Encouraged see psychiatry to discuss  meds for the chronic insomnia

## 2022-05-02 DIAGNOSIS — T45.1X5A CHEMOTHERAPY-INDUCED PERIPHERAL NEUROPATHY (HCC): ICD-10-CM

## 2022-05-02 DIAGNOSIS — I10 HTN, GOAL BELOW 140/80: ICD-10-CM

## 2022-05-02 DIAGNOSIS — G62.0 CHEMOTHERAPY-INDUCED PERIPHERAL NEUROPATHY (HCC): ICD-10-CM

## 2022-05-02 DIAGNOSIS — I51.7 LVH (LEFT VENTRICULAR HYPERTROPHY): ICD-10-CM

## 2022-05-05 RX ORDER — GABAPENTIN 100 MG/1
CAPSULE ORAL
Qty: 90 CAPSULE | OUTPATIENT
Start: 2022-05-05

## 2022-05-05 RX ORDER — LISINOPRIL 2.5 MG/1
TABLET ORAL
Qty: 90 TABLET | Refills: 0 | Status: SHIPPED | OUTPATIENT
Start: 2022-05-05 | End: 2022-08-05

## 2022-05-05 RX ORDER — MELOXICAM 15 MG/1
TABLET ORAL
Qty: 90 TABLET | Refills: 0 | OUTPATIENT
Start: 2022-05-05

## 2022-05-05 NOTE — TELEPHONE ENCOUNTER
I declined the mobic refill due to her renal function and the gabapentin as there are still refills on this order from April with 3 refills available  Did refill the lisinopril  Can you let her knkow should not be using mobic due to her kidneys and has refills still on gabapentin thanks

## 2022-05-10 DIAGNOSIS — K21.9 GASTROESOPHAGEAL REFLUX DISEASE WITHOUT ESOPHAGITIS: ICD-10-CM

## 2022-05-10 RX ORDER — OMEPRAZOLE 20 MG/1
CAPSULE, DELAYED RELEASE ORAL
Qty: 90 CAPSULE | Refills: 1 | Status: SHIPPED | OUTPATIENT
Start: 2022-05-10 | End: 2022-07-07

## 2022-05-31 DIAGNOSIS — G62.9 NEUROPATHY: ICD-10-CM

## 2022-05-31 RX ORDER — DULOXETIN HYDROCHLORIDE 60 MG/1
CAPSULE, DELAYED RELEASE ORAL
Qty: 30 CAPSULE | Refills: 3 | Status: SHIPPED | OUTPATIENT
Start: 2022-05-31 | End: 2022-10-03

## 2022-06-14 ENCOUNTER — TELEPHONE (OUTPATIENT)
Dept: INTERNAL MEDICINE CLINIC | Age: 82
End: 2022-06-14

## 2022-06-14 NOTE — TELEPHONE ENCOUNTER
Incoming call from patient via the [address] [place] [address],[address] on the nurse triage line. Patient states while outside she began having an episode of weakness & dizziness. She was escorted by her  back into the house to lay down. She reports her bp has been low on her home cuff with the lowest being 76/64. Most recent reading was 123/81. She denies chest pain, headaches, sob at this time. Her bp med was increased to bid at her last appt. She is wondering if she is over medicated. Advised she needs an office visit to have her bp checked. Pt declined visit today due to spouse having appts so appt made for Wed at 11:45. Pt encouraged to keep a log of her pressures & bring that with her to appt. Patient voiced understanding.

## 2022-06-15 ENCOUNTER — OFFICE VISIT (OUTPATIENT)
Dept: INTERNAL MEDICINE CLINIC | Age: 82
End: 2022-06-15
Payer: MEDICARE

## 2022-06-15 VITALS
OXYGEN SATURATION: 99 % | RESPIRATION RATE: 16 BRPM | SYSTOLIC BLOOD PRESSURE: 88 MMHG | DIASTOLIC BLOOD PRESSURE: 58 MMHG | HEART RATE: 91 BPM | HEIGHT: 61 IN | BODY MASS INDEX: 26.58 KG/M2 | TEMPERATURE: 98 F | WEIGHT: 140.8 LBS

## 2022-06-15 DIAGNOSIS — G62.9 NEUROPATHY: ICD-10-CM

## 2022-06-15 DIAGNOSIS — I95.2 HYPOTENSION DUE TO DRUGS: Primary | ICD-10-CM

## 2022-06-15 DIAGNOSIS — I51.7 LVH (LEFT VENTRICULAR HYPERTROPHY): ICD-10-CM

## 2022-06-15 DIAGNOSIS — F51.01 PRIMARY INSOMNIA: ICD-10-CM

## 2022-06-15 PROCEDURE — G8399 PT W/DXA RESULTS DOCUMENT: HCPCS | Performed by: INTERNAL MEDICINE

## 2022-06-15 PROCEDURE — G0463 HOSPITAL OUTPT CLINIC VISIT: HCPCS | Performed by: INTERNAL MEDICINE

## 2022-06-15 PROCEDURE — 1101F PT FALLS ASSESS-DOCD LE1/YR: CPT | Performed by: INTERNAL MEDICINE

## 2022-06-15 PROCEDURE — G8427 DOCREV CUR MEDS BY ELIG CLIN: HCPCS | Performed by: INTERNAL MEDICINE

## 2022-06-15 PROCEDURE — G8510 SCR DEP NEG, NO PLAN REQD: HCPCS | Performed by: INTERNAL MEDICINE

## 2022-06-15 PROCEDURE — G8752 SYS BP LESS 140: HCPCS | Performed by: INTERNAL MEDICINE

## 2022-06-15 PROCEDURE — 99214 OFFICE O/P EST MOD 30 MIN: CPT | Performed by: INTERNAL MEDICINE

## 2022-06-15 PROCEDURE — G8754 DIAS BP LESS 90: HCPCS | Performed by: INTERNAL MEDICINE

## 2022-06-15 PROCEDURE — G8536 NO DOC ELDER MAL SCRN: HCPCS | Performed by: INTERNAL MEDICINE

## 2022-06-15 PROCEDURE — G8417 CALC BMI ABV UP PARAM F/U: HCPCS | Performed by: INTERNAL MEDICINE

## 2022-06-15 PROCEDURE — 1090F PRES/ABSN URINE INCON ASSESS: CPT | Performed by: INTERNAL MEDICINE

## 2022-06-15 NOTE — PROGRESS NOTES
HISTORY OF PRESENT ILLNESS  Manjula Velez is a [de-identified] y.o. female. HPI  Seen with her . Two concerns:  1. Dizziness with recent hypotension. She is on Lisinopril 2.5 b.i.d. and recently started taking the morning dose every other day. Despite this, she has been dizzy, lightheaded, and yesterday and today her systolic pressure has been below 90. She has not had evidence of bleeding or chest pain. 2. Chronic insomnia, which has been an issue for years. In the past we tried Trazodone and more recently Doxepin. Did not feel that either of these were beneficial.  She had Sonata in the past, but I am uncomfortable writing this as a chronic med and her issues are chronic. She is on Gabapentin and Duloxetine. Denies feeling anxious about her 's health or about other things. Just frustrated that she cannot sleep well. I am again suggesting she see a sleep specialist.  Also discussed possibility of acupuncture and discussed psychiatry for med management. Review of Systems   Constitutional: Positive for malaise/fatigue. Negative for chills, diaphoresis, fever and weight loss. HENT: Negative for congestion, ear discharge, ear pain, nosebleeds and sore throat. Eyes: Negative for pain and discharge. Respiratory: Positive for cough. Negative for hemoptysis, sputum production, shortness of breath and wheezing. Cardiovascular: Negative for chest pain. Gastrointestinal: Negative for abdominal pain and blood in stool. Neurological: Positive for dizziness. Negative for headaches. Psychiatric/Behavioral: The patient has insomnia. The patient is not nervous/anxious. Physical Exam  Vitals and nursing note reviewed. Constitutional:       Appearance: She is well-developed. HENT:      Head: Normocephalic and atraumatic. Neck:      Thyroid: No thyromegaly. Vascular: No carotid bruit. Cardiovascular:      Rate and Rhythm: Normal rate and regular rhythm.       Heart sounds: Normal heart sounds, S1 normal and S2 normal. No murmur heard. Pulmonary:      Effort: Pulmonary effort is normal. No respiratory distress. Breath sounds: Normal breath sounds. No wheezing or rales. Musculoskeletal:      Cervical back: Normal range of motion and neck supple. Neurological:      Mental Status: She is alert and oriented to person, place, and time. Psychiatric:         Behavior: Behavior normal.         ASSESSMENT and PLAN  Diagnoses and all orders for this visit:    1. Hypotension due to drugs  2 mo ago we increased her lisinopril to bid and I  Think this is too much for her-decrease back to qhs only and notify me in 2 weeks of bp results  If still low will stop the lisinopril 2.5  2. LVH (left ventricular hypertrophy)    3. Neuropathy-cont  On gabapentin 1 in am and 3 qhs    4.  Primary insomnia-persistent despite gabapentin and doxepin and duloxetine  I am out of options for her and we discussed acupuncture and also a sleep specialist   -     [facility]

## 2022-07-07 ENCOUNTER — OFFICE VISIT (OUTPATIENT)
Dept: SLEEP MEDICINE | Age: 82
End: 2022-07-07
Payer: MEDICARE

## 2022-07-07 VITALS
DIASTOLIC BLOOD PRESSURE: 91 MMHG | HEIGHT: 59 IN | BODY MASS INDEX: 28.28 KG/M2 | OXYGEN SATURATION: 97 % | SYSTOLIC BLOOD PRESSURE: 140 MMHG | WEIGHT: 140.3 LBS | HEART RATE: 86 BPM

## 2022-07-07 DIAGNOSIS — G47.00 INSOMNIA, UNSPECIFIED TYPE: Primary | ICD-10-CM

## 2022-07-07 PROCEDURE — G8753 SYS BP > OR = 140: HCPCS | Performed by: SPECIALIST

## 2022-07-07 PROCEDURE — 99204 OFFICE O/P NEW MOD 45 MIN: CPT | Performed by: SPECIALIST

## 2022-07-07 PROCEDURE — 1090F PRES/ABSN URINE INCON ASSESS: CPT | Performed by: SPECIALIST

## 2022-07-07 PROCEDURE — 1101F PT FALLS ASSESS-DOCD LE1/YR: CPT | Performed by: SPECIALIST

## 2022-07-07 PROCEDURE — G8755 DIAS BP > OR = 90: HCPCS | Performed by: SPECIALIST

## 2022-07-07 PROCEDURE — 1123F ACP DISCUSS/DSCN MKR DOCD: CPT | Performed by: SPECIALIST

## 2022-07-07 PROCEDURE — G8427 DOCREV CUR MEDS BY ELIG CLIN: HCPCS | Performed by: SPECIALIST

## 2022-07-07 PROCEDURE — G8417 CALC BMI ABV UP PARAM F/U: HCPCS | Performed by: SPECIALIST

## 2022-07-07 PROCEDURE — G8432 DEP SCR NOT DOC, RNG: HCPCS | Performed by: SPECIALIST

## 2022-07-07 PROCEDURE — G8399 PT W/DXA RESULTS DOCUMENT: HCPCS | Performed by: SPECIALIST

## 2022-07-07 PROCEDURE — G8536 NO DOC ELDER MAL SCRN: HCPCS | Performed by: SPECIALIST

## 2022-07-07 RX ORDER — CLONAZEPAM 0.5 MG/1
TABLET ORAL
Qty: 30 TABLET | Refills: 3 | Status: SHIPPED | OUTPATIENT
Start: 2022-07-07 | End: 2022-08-18 | Stop reason: SDUPTHER

## 2022-07-07 RX ORDER — MELOXICAM 15 MG/1
15 TABLET ORAL DAILY
COMMUNITY
End: 2022-09-26

## 2022-07-07 NOTE — PATIENT INSTRUCTIONS
Insomnia: Care Instructions  Overview     Insomnia is the inability to sleep well. Insomnia may make it hard for you to get to sleep, stay asleep, or sleep as long as you need to. This can make you tired and grouchy during the day. It can also make you forgetful, less effective at work, and unhappy. Insomnia can be linked to many things. These include health problems, medicines, and stressful events. Treatment may include treating problems that may be linked with your insomnia. Treatment also includes behavior and lifestyle changes. This may include cognitive-behavioral therapy for insomnia (CBT-I). CBT-I uses different ways to help you change your thoughts and behaviors that may interfere with sleep. Your doctor can recommend specific things you can try. Examples include doing relaxation exercises, keeping regular bedtimes and wake times, limiting alcohol, and making healthy sleep habits. Some people decide to take medicine for a while to help with sleep. Follow-up care is a key part of your treatment and safety. Be sure to make and go to all appointments, and call your doctor if you are having problems. It's also a good idea to know your test results and keep a list of the medicines you take. How can you care for yourself at home? Cognitive-behavioral therapy for insomnia (CBT-I)  · If your doctor recommends CBT-I, follow your treatment plan. Your doctor will give you instructions that are unique for you. · Your plan will likely include a few things that you can try at home. For example:  ? Try meditation or other relaxation techniques before you go to bed. ? Go to bed at the same time every night, and wake up at the same time every morning. Do not take naps during the day. ? Do not stay in bed awake for too long.  If you can't fall asleep, or if you wake up in the middle of the night and can't get back to sleep within about 15 to 20 minutes, get out of bed and go to another room until you feel sleepy. ? If watching the clock makes you anxious, turn it facing away from you so you cannot see the time. ? If you worry when you lie down, start a worry book. Well before bedtime, write down your worries, and then set the book and your concerns aside. Healthy sleep habits  · If your doctor recommends it, try making healthy sleep habits. For example:  ? Keep your bedroom quiet, dark, and cool. ? Do not have drinks with caffeine, such as coffee or black tea, for 8 hours before bed. ? Do not smoke or use other types of tobacco near bedtime. Nicotine is a stimulant and can keep you awake. ? Avoid drinking alcohol late in the evening, because it can cause you to wake in the middle of the night. ? Do not eat a big meal close to bedtime. If you are hungry, eat a light snack. ? Do not drink a lot of water close to bedtime, because the need to urinate may wake you up during the night. ? Do not read, watch TV, or use your phone in bed. Use the bed only for sleeping and sex. Medicine  · Be safe with medicines. Take your medicines exactly as prescribed. Call your doctor if you think you are having a problem with your medicine. · Talk with your doctor before you try an over-the-counter medicine, herbal product, or supplement to try to improve your sleep. Your doctor can recommend how much to take and when to take it. Make sure your doctor knows all of the medicines, vitamins, herbal products, and supplements you take. · You will get more details on the specific medicines your doctor prescribes. When should you call for help? Watch closely for changes in your health, and be sure to contact your doctor if:    · Your efforts to improve your sleep do not work.     · Your insomnia gets worse.     · You have been feeling down, depressed, or hopeless or have lost interest in things that you usually enjoy. Where can you learn more?   Go to http://www.gray.com/  Enter P513 in the search box to learn more about \"Insomnia: Care Instructions. \"  Current as of: June 16, 2021               Content Version: 13.2  © 5829-8213 [employer]wise, Incorporated. Care instructions adapted under license by [employer] (which disclaims liability or warranty for this information). If you have questions about a medical condition or this instruction, always ask your healthcare professional. [address] any warranty or liability for your use of this information.

## 2022-07-07 NOTE — PROGRESS NOTES
[facility] [place]. [place], [address]  Tel.  [phone]  Fax. 100 [facility] 60  [place], [address] [facility]  Tel.  [phone]  Fax. [phone] [address] [facility] Dameon Black  Tel.  [phone]  Fax. [phone]       Chief Complaint       Chief Complaint   Patient presents with    Sleep Problem     Refd by Campos Zhang for eval of SCAR. MELVIN Hairston is [de-identified] y.o. female seen for evaluation of a sleep disorder. Accompanied by her . States she has a long history of sleeping difficulties states that she \"cannot sleep\". Normally retires at midnight and will often get out of bed between 1-5 AM.  Watches TV at that time. May not return back to bed until 5 AM and sleeps until 1-2 PM.    Notes having had \"several issues\" with medications. Currently on Sonata. Over-the-counter sleep aids ineffective. Problem and present during the past 30 years. Notes leg discomfort. Has a lower extremity orthopedic/knee issues. Does not experience excessive daytime sleepiness. [place] Sleepiness Score: 5       No Known Allergies    Current Outpatient Medications   Medication Sig Dispense Refill    meloxicam (MOBIC) 15 mg tablet Take 15 mg by mouth daily.  clonazePAM (KlonoPIN) 0.5 mg tablet Take 1/2 or 1 tab at bedtime as needed. 30 Tablet 3    lisinopriL (PRINIVIL, ZESTRIL) 2.5 mg tablet TAKE 1 TABLET BY MOUTH EVERY DAY 90 Tablet 0    gabapentin (NEURONTIN) 100 mg capsule 1 po qam and 3 po qhs 120 Capsule 3    omeprazole (PRILOSEC) 20 mg capsule TAKE 1 CAPSULE BY MOUTH ONCE DAILY 30 Capsule 0    linaCLOtide (Linzess) 290 mcg cap capsule Take 1 Capsule by mouth daily.  90 Capsule 0    DULoxetine (CYMBALTA) 60 mg capsule TAKE 1 CAPSULE BY MOUTH EVERY DAY WITH 30 MG CAPSULE (Patient not taking: Reported on 7/7/2022) 30 Capsule 3        She  has a past medical history of Arthritis, Breast cancer ([facility] (2015), Constipation, Irritable bowel syndrome (IBS), Lymphedema, Mitral prolapse, Neurological disorder, and Radiation therapy complication (0397). She has no past medical history of Arrhythmia, Autoimmune disease ([address] or Sleep apnea. She  has a past surgical history that includes hx gyn; hx orthopaedic (Right); hx cholecystectomy; hx colonoscopy; colonoscopy,diagnostic (5/31/2018); colonoscopy (N/A, 5/31/2018); hx breast biopsy (Left, yrs); hx breast lumpectomy (Left, 2015); and hx breast reduction (Bilateral, yrs ago). She family history includes Breast Cancer in her maternal grandmother; Heart Disease in her father and mother. She  reports that she has quit smoking. She has never used smokeless tobacco. She reports that she does not drink alcohol and does not use drugs. Review of Systems:  ROS      Objective:     Visit Vitals  BP (!) 140/91   Pulse 86   Ht 4' 11.45\" (1.51 m)   Wt 140 lb 4.8 oz (63.6 kg)   SpO2 97%   BMI 27.91 kg/m²     Body mass index is 27.91 kg/m². General:   Conversant, cooperative   Eyes:  Pupils equal and reactive, no nystagmus   Oropharynx:   Mallampati score I, tongue normal       Neck:   No carotid bruits; Neck circ. in \"inches\": 14   Chest/Lungs:  Clear on auscultation    CVS:  Normal rate, regular rhythm   Skin:  Warm to touch; no obvious rashes   Neuro:  Speech fluent, face symmetrical, tongue movement normal   Psych:  Normal affect,  normal countenance        Assessment:       ICD-10-CM ICD-9-CM    1. Insomnia, unspecified type  G47.00 780.52 clonazePAM (KlonoPIN) 0.5 mg tablet     Insomnia. Nonpharmacologic treatments discussed. Trial of clonazepam 0.5 mg, take one half initially. Sleep hygiene measures reviewed. Plan:     Orders Placed This Encounter    clonazePAM (KlonoPIN) 0.5 mg tablet     Sig: Take 1/2 or 1 tab at bedtime as needed.      Dispense:  30 Tablet     Refill:  3       * Patient has a history  consistent with the diagnosis of chronic insomnia  *Ferritin if sleep onset continues to be poorly controlled. * She was provided information on insomnia including corresponding risk factors and the importance of proper treatment. * Treatment options if indicated were reviewed today. Instructions:    o Call or return if symptoms worsen or persist.          Sesar Watkins MD, [facility]  Electronically signed 07/07/22       This note was created using voice recognition software. Despite editing, there may be syntax errors. This note will not be viewable in [address]

## 2022-08-05 DIAGNOSIS — I51.7 LVH (LEFT VENTRICULAR HYPERTROPHY): ICD-10-CM

## 2022-08-05 DIAGNOSIS — I10 HTN, GOAL BELOW 140/80: ICD-10-CM

## 2022-08-05 RX ORDER — LISINOPRIL 2.5 MG/1
TABLET ORAL
Qty: 180 TABLET | Refills: 1 | Status: SHIPPED | OUTPATIENT
Start: 2022-08-05 | End: 2022-10-03 | Stop reason: DRUGHIGH

## 2022-08-18 ENCOUNTER — OFFICE VISIT (OUTPATIENT)
Dept: SLEEP MEDICINE | Age: 82
End: 2022-08-18
Payer: MEDICARE

## 2022-08-18 VITALS
WEIGHT: 139 LBS | OXYGEN SATURATION: 96 % | BODY MASS INDEX: 26.24 KG/M2 | HEART RATE: 82 BPM | DIASTOLIC BLOOD PRESSURE: 88 MMHG | HEIGHT: 61 IN | SYSTOLIC BLOOD PRESSURE: 135 MMHG

## 2022-08-18 DIAGNOSIS — G47.00 INSOMNIA, UNSPECIFIED TYPE: Primary | ICD-10-CM

## 2022-08-18 PROCEDURE — G8417 CALC BMI ABV UP PARAM F/U: HCPCS | Performed by: SPECIALIST

## 2022-08-18 PROCEDURE — 1123F ACP DISCUSS/DSCN MKR DOCD: CPT | Performed by: SPECIALIST

## 2022-08-18 PROCEDURE — G8399 PT W/DXA RESULTS DOCUMENT: HCPCS | Performed by: SPECIALIST

## 2022-08-18 PROCEDURE — 1101F PT FALLS ASSESS-DOCD LE1/YR: CPT | Performed by: SPECIALIST

## 2022-08-18 PROCEDURE — G8536 NO DOC ELDER MAL SCRN: HCPCS | Performed by: SPECIALIST

## 2022-08-18 PROCEDURE — G8752 SYS BP LESS 140: HCPCS | Performed by: SPECIALIST

## 2022-08-18 PROCEDURE — G8432 DEP SCR NOT DOC, RNG: HCPCS | Performed by: SPECIALIST

## 2022-08-18 PROCEDURE — G8427 DOCREV CUR MEDS BY ELIG CLIN: HCPCS | Performed by: SPECIALIST

## 2022-08-18 PROCEDURE — G8754 DIAS BP LESS 90: HCPCS | Performed by: SPECIALIST

## 2022-08-18 PROCEDURE — 1090F PRES/ABSN URINE INCON ASSESS: CPT | Performed by: SPECIALIST

## 2022-08-18 PROCEDURE — 99213 OFFICE O/P EST LOW 20 MIN: CPT | Performed by: SPECIALIST

## 2022-08-18 RX ORDER — CLONAZEPAM 0.5 MG/1
TABLET ORAL
Qty: 60 TABLET | Refills: 3 | Status: SHIPPED | OUTPATIENT
Start: 2022-08-18 | End: 2022-10-03

## 2022-08-18 NOTE — PROGRESS NOTES
[address] [facility] [place]. [place], [address]  Tel.  [phone]  Fax. 100 [facility] 60  [place], [address] [facility]  Tel.  [phone]  Fax. [phone] 9[address] [facility] Dameon Black   Tel.  [phone]  Fax. [phone]         Chief Complaint       Chief Complaint   Patient presents with    Sleep Problem     6 wk follow up         HPI        Marilyn Reddy is a [de-identified] y.o. female seen for follow-up. She reported  a long history of sleeping difficulties stating that she \"cannot sleep\". Noted having had \"several issues\" with medications. Previously on Sonata. Over-the-counter sleep aids ineffective. Accompanied by her  who assists with the history. Did not report excessive daytime sleepiness. Most recently on Sonata. Started on clonazepam 0.5 mg .    Currently sleeps approximately 5 hours. Notes that abnormal leg movements have become less prominent since starting clonazepam.  Feel that she is still \"not sleeping\". She describes her self as \"never been a good sleeper\". Denies excessive daytime sleepiness. At times may doze if seated and inactive such as when watching TV, riding as a passenger. [place] Sleepiness Scale: 7      No Known Allergies    Current Outpatient Medications   Medication Sig Dispense Refill    clonazePAM (KlonoPIN) 0.5 mg tablet Take 1.5  or 2 tab at bedtime as needed. 60 Tablet 3    lisinopriL (PRINIVIL, ZESTRIL) 2.5 mg tablet TAKE 1 TABLET BY MOUTH TWO TIMES A DAY. 180 Tablet 1    meloxicam (MOBIC) 15 mg tablet Take 15 mg by mouth daily. gabapentin (NEURONTIN) 100 mg capsule 1 po qam and 3 po qhs 120 Capsule 3    omeprazole (PRILOSEC) 20 mg capsule TAKE 1 CAPSULE BY MOUTH ONCE DAILY 30 Capsule 0    linaCLOtide (Linzess) 290 mcg cap capsule Take 1 Capsule by mouth daily.  90 Capsule 0    DULoxetine (CYMBALTA) 60 mg capsule TAKE 1 CAPSULE BY MOUTH EVERY DAY WITH 30 MG CAPSULE (Patient not taking: No sig reported) 30 Capsule 3        She  has a past medical history of Arthritis, Breast cancer ([facility] (2015), Constipation, Irritable bowel syndrome (IBS), Lymphedema, Mitral prolapse, Neurological disorder, and Radiation therapy complication (8991). She has no past medical history of Arrhythmia, Autoimmune disease ([facility] or Sleep apnea. She  has a past surgical history that includes hx gyn; hx orthopaedic (Right); hx cholecystectomy; hx colonoscopy; colonoscopy,diagnostic (5/31/2018); colonoscopy (N/A, 5/31/2018); hx breast biopsy (Left, yrs); hx breast lumpectomy (Left, 2015); and hx breast reduction (Bilateral, yrs ago). She family history includes Breast Cancer in her maternal grandmother; Heart Disease in her father and mother. She  reports that she has quit smoking. She has never used smokeless tobacco. She reports that she does not drink alcohol and does not use drugs. Review of Systems:  Unchanged per patient      Objective:   Visit Vitals  /88   Pulse 82   Ht 5' 1\" (1.549 m)   Wt 139 lb (63 kg)   SpO2 96%   BMI 26.26 kg/m²     Body mass index is 26.26 kg/m². General:   Conversant, cooperative   Eyes:  no nystagmus                            Neuro:  Speech fluent, face symmetrical             Assessment:       ICD-10-CM ICD-9-CM    1. Insomnia, unspecified type  G47.00 780.52 clonazePAM (KlonoPIN) 0.5 mg tablet        Notes some improvement with 0.5 mg clonazepam that she is having a reduction in prominence of abnormal leg movements. Recommend increasing the clonazepam to 0.75 mg at bedtime, consider 1 mg. Plan:     Orders Placed This Encounter    clonazePAM (KlonoPIN) 0.5 mg tablet     Sig: Take 1.5  or 2 tab at bedtime as needed. Dispense:  60 Tablet     Refill:  3           * She was provided information on insomnia including corresponding risk factors and the importance of proper treatment. * Treatment options if indicated were reviewed today.        Belen Kilgore MD, [facility]  Electronically signed 08/18/22        This note was created using voice recognition software. Despite editing, there may be syntax errors. This note will not be viewable in [address]

## 2022-09-26 RX ORDER — MELOXICAM 15 MG/1
TABLET ORAL
Qty: 90 TABLET | Refills: 1 | Status: SHIPPED | OUTPATIENT
Start: 2022-09-26

## 2022-10-03 ENCOUNTER — VIRTUAL VISIT (OUTPATIENT)
Dept: INTERNAL MEDICINE CLINIC | Age: 82
End: 2022-10-03
Payer: MEDICARE

## 2022-10-03 DIAGNOSIS — U07.1 COVID-19: Primary | ICD-10-CM

## 2022-10-03 DIAGNOSIS — C50.112 MALIGNANT NEOPLASM OF CENTRAL PORTION OF LEFT FEMALE BREAST, UNSPECIFIED ESTROGEN RECEPTOR STATUS (HCC): ICD-10-CM

## 2022-10-03 PROCEDURE — 1090F PRES/ABSN URINE INCON ASSESS: CPT | Performed by: INTERNAL MEDICINE

## 2022-10-03 PROCEDURE — G8399 PT W/DXA RESULTS DOCUMENT: HCPCS | Performed by: INTERNAL MEDICINE

## 2022-10-03 PROCEDURE — G8427 DOCREV CUR MEDS BY ELIG CLIN: HCPCS | Performed by: INTERNAL MEDICINE

## 2022-10-03 PROCEDURE — 1101F PT FALLS ASSESS-DOCD LE1/YR: CPT | Performed by: INTERNAL MEDICINE

## 2022-10-03 PROCEDURE — 99213 OFFICE O/P EST LOW 20 MIN: CPT | Performed by: INTERNAL MEDICINE

## 2022-10-03 PROCEDURE — G8536 NO DOC ELDER MAL SCRN: HCPCS | Performed by: INTERNAL MEDICINE

## 2022-10-03 PROCEDURE — G8510 SCR DEP NEG, NO PLAN REQD: HCPCS | Performed by: INTERNAL MEDICINE

## 2022-10-03 PROCEDURE — G8756 NO BP MEASURE DOC: HCPCS | Performed by: INTERNAL MEDICINE

## 2022-10-03 PROCEDURE — G0463 HOSPITAL OUTPT CLINIC VISIT: HCPCS | Performed by: INTERNAL MEDICINE

## 2022-10-03 PROCEDURE — G8417 CALC BMI ABV UP PARAM F/U: HCPCS | Performed by: INTERNAL MEDICINE

## 2022-10-03 RX ORDER — LISINOPRIL 2.5 MG/1
2.5 TABLET ORAL DAILY
COMMUNITY

## 2022-10-03 NOTE — PROGRESS NOTES
Charles Mantilla (: 1940) is a [de-identified] y.o. female, established patient, here for evaluation of the following chief complaint(s):   Positive For Covid-19 (Patient tested positive for Covid on Saturday , would like to discuss Paxlovid. )       ASSESSMENT/PLAN:  Below is the assessment and plan developed based on review of pertinent history, labs, studies, and medications. 1. COVID-19  2. Malignant neoplasm of central portion of left female breast, unspecified estrogen receptor status ([facility] [address] [address]      No follow-ups on file. SUBJECTIVE/OBJECTIVE:  HPI  3days of sxs with cough and fatigue no fevers and no sob   has covid also  She is fully vaccinated  Discussed pros and cons of antiviral and discussed side effects  Follow up if signs and symptoms worsen or change. After hours number given.      Review of Systems     Patient-Reported Weight: 135lb       Physical Exam    [INSTRUCTIONS:  \"[x]\" Indicates a positive item  \"[]\" Indicates a negative item  -- DELETE ALL ITEMS NOT EXAMINED]    Constitutional: [x] Appears well-developed and well-nourished [x] No apparent distress      [] Abnormal -     Mental status: [x] Alert and awake  [x] Oriented to person/place/time [x] Able to follow commands    [] Abnormal -     Eyes:   EOM    [x]  Normal    [] Abnormal -   Sclera  [x]  Normal    [] Abnormal -          Discharge [x]  None visible   [] Abnormal -     HENT: [x] Normocephalic, atraumatic  [] Abnormal -   [x] Mouth/Throat: Mucous membranes are moist    External Ears [x] Normal  [] Abnormal -    Neck: [x] No visualized mass [] Abnormal -     Pulmonary/Chest: [x] Respiratory effort normal   [x] No visualized signs of difficulty breathing or respiratory distress        [] Abnormal -      Musculoskeletal:   [x] Normal gait with no signs of ataxia         [x] Normal range of motion of neck        [] Abnormal -     Neurological:        [x] No Facial Asymmetry (Cranial nerve 7 motor function) (limited exam due to video visit) [x] No gaze palsy        [] Abnormal -          Skin:        [x] No significant exanthematous lesions or discoloration noted on facial skin         [] Abnormal -            Psychiatric:       [x] Normal Affect [] Abnormal -        [x] No Hallucinations    Other pertinent observable physical exam findings:-    On this date 10/03/2022 I have spent 14 minutes reviewing previous notes, test results and face to face (virtual) with the patient discussing the diagnosis and importance of compliance with the treatment plan as well as documenting on the day of the visit. Rabia Dooley, was evaluated through a synchronous (real-time) audio-video encounter. The patient (or guardian if applicable) is aware that this is a billable service, which includes applicable co-pays. This Virtual Visit was conducted with patient's (and/or legal guardian's) consent. The visit was conducted pursuant to the emergency declaration under the [address] [place] [address], [address] [place] [address] authority and the [employer] and [employer] General Act. Patient identification was verified, and a caregiver was present when appropriate. The patient was located at: Home: [address] [place] [address]  The provider was located at: Facility (Appt Department): [address]       An electronic signature was used to authenticate this note.   -- Elvie Lara MD

## 2022-10-04 ENCOUNTER — OFFICE VISIT (OUTPATIENT)
Dept: NEUROLOGY | Age: 82
End: 2022-10-04

## 2022-10-04 DIAGNOSIS — G31.84 MILD COGNITIVE IMPAIRMENT: Primary | ICD-10-CM

## 2022-10-04 NOTE — PROGRESS NOTES
Attempted to contact patient multiple times. On Doxy, it would show up and his screen would freeze. I messaged him and we tried multiple times, and his screen would freeze or he couldn't hear me. I then messaged him to hang up so we could talk by phone. I called his phone which is connect to his hearing aids. However, I was screaming into the phone and he still had a very hard time hearing me. At this juncture, I will ask staff to schedule cherry nd his wife for tomorrow and we can try with some other phones. My volume is all the way up. I tested the phone by calling my own cell and could hear fine.

## 2022-10-07 ENCOUNTER — TELEPHONE (OUTPATIENT)
Dept: INTERNAL MEDICINE CLINIC | Age: 82
End: 2022-10-07

## 2022-10-07 NOTE — TELEPHONE ENCOUNTER
Patient's  called to state his wife has finished taking her medication for Covid but that he is concerned about her blood oxygen count. Patient's  states her blood oxygen is in the low 90s and only got as high as 97. PSR is routing this to the doctor as the nurse has left for the weekend.

## 2022-10-07 NOTE — TELEPHONE ENCOUNTER
Patient's  called back in as he is concerned for his wife. PSR told patient the message had been sent to Dr. Saint Apo and that if he doesn't hear back by 5 today that he can call the on-call doctor who will be here if he feels this is needed. PSR is going to route this to a covering nurse who is still here so that she can reach out if necessary.

## 2022-10-07 NOTE — TELEPHONE ENCOUNTER
I called back and reassured mr ku that oxygen levels in low to mid 90s are very safe and we only are concerned if it is dropping below 90 % over time  He notes she does feel a bit better and I am encouraged by that and reassured that current oxygenation is safe

## 2022-10-24 ENCOUNTER — TELEPHONE (OUTPATIENT)
Dept: NEUROLOGY | Age: 82
End: 2022-10-24

## 2022-10-26 ENCOUNTER — OFFICE VISIT (OUTPATIENT)
Dept: NEUROLOGY | Age: 82
End: 2022-10-26

## 2022-10-26 DIAGNOSIS — Z91.199 NO-SHOW FOR APPOINTMENT: Primary | ICD-10-CM

## 2022-10-26 NOTE — PROGRESS NOTES
No show/no call for neuropsych consult that had been scheduled because the patient was hard of hearing during the first attempt to meet with them  virtually.

## 2022-10-26 NOTE — PROGRESS NOTES
As the patient missed their initial visit today, their testing appointment is also a no show. Four hours lost technician time and two hours lost provider time.

## 2022-10-26 NOTE — LETTER
10/26/2022    Patient: Antonio Smith   YOB: 1940   Date of Visit: 10/26/2022     [facility] [address]a [address] [facility]  [address]  [address] [facility]  Via In Basket    Dear Maria R De La Paz MD,      Thank you for referring Ms. Bhavana Silva to [facility] for evaluation. My notes for this consultation are attached. If you have questions, please do not hesitate to call me. I look forward to following your patient along with you.       Sincerely,    Shamika Pepper PsyD

## 2022-10-26 NOTE — LETTER
10/26/2022    Patient: Alonso Dsouza   YOB: 1940   Date of Visit: 10/26/2022     [facility] [address] [facility]  [address]  [address] [facility]  Via In Basket    Dear Valencia Lopez MD,      Thank you for referring Ms. Chantel Paz to [facility] for evaluation. My notes for this consultation are attached. If you have questions, please do not hesitate to call me. I look forward to following your patient along with you.       Sincerely,    Sotero Pal PsyD

## 2022-10-27 ENCOUNTER — OFFICE VISIT (OUTPATIENT)
Dept: NEUROLOGY | Age: 82
End: 2022-10-27
Payer: MEDICARE

## 2022-10-27 DIAGNOSIS — F41.9 CHRONIC ANXIETY: ICD-10-CM

## 2022-10-27 DIAGNOSIS — G31.84 MILD COGNITIVE IMPAIRMENT: Primary | ICD-10-CM

## 2022-10-27 DIAGNOSIS — F41.1 GENERALIZED ANXIETY DISORDER: ICD-10-CM

## 2022-10-27 DIAGNOSIS — F90.0 ATTENTION DEFICIT HYPERACTIVITY DISORDER (ADHD), INATTENTIVE TYPE, MODERATE: Chronic | ICD-10-CM

## 2022-10-27 DIAGNOSIS — F31.81 BIPOLAR II DISORDER, MODERATE, DEPRESSED, WITH ANXIOUS DISTRESS (HCC): ICD-10-CM

## 2022-10-27 DIAGNOSIS — R41.89 COGNITIVE DECLINE: ICD-10-CM

## 2022-10-27 DIAGNOSIS — Z63.0 MARITAL PROBLEM: ICD-10-CM

## 2022-10-27 DIAGNOSIS — R45.86 MOOD SWINGS: ICD-10-CM

## 2022-10-27 DIAGNOSIS — R45.4 IRRITABILITY AND ANGER: ICD-10-CM

## 2022-10-27 PROCEDURE — 90791 PSYCH DIAGNOSTIC EVALUATION: CPT | Performed by: CLINICAL NEUROPSYCHOLOGIST

## 2022-10-27 PROCEDURE — 96139 PSYCL/NRPSYC TST TECH EA: CPT | Performed by: CLINICAL NEUROPSYCHOLOGIST

## 2022-10-27 PROCEDURE — 1123F ACP DISCUSS/DSCN MKR DOCD: CPT | Performed by: CLINICAL NEUROPSYCHOLOGIST

## 2022-10-27 PROCEDURE — 96132 NRPSYC TST EVAL PHYS/QHP 1ST: CPT | Performed by: CLINICAL NEUROPSYCHOLOGIST

## 2022-10-27 PROCEDURE — 96137 PSYCL/NRPSYC TST PHY/QHP EA: CPT | Performed by: CLINICAL NEUROPSYCHOLOGIST

## 2022-10-27 PROCEDURE — 96133 NRPSYC TST EVAL PHYS/QHP EA: CPT | Performed by: CLINICAL NEUROPSYCHOLOGIST

## 2022-10-27 PROCEDURE — 96136 PSYCL/NRPSYC TST PHY/QHP 1ST: CPT | Performed by: CLINICAL NEUROPSYCHOLOGIST

## 2022-10-27 PROCEDURE — 96138 PSYCL/NRPSYC TECH 1ST: CPT | Performed by: CLINICAL NEUROPSYCHOLOGIST

## 2022-10-27 SDOH — SOCIAL STABILITY - SOCIAL INSECURITY: PROBLEMS IN RELATIONSHIP WITH SPOUSE OR PARTNER: Z63.0

## 2022-10-27 NOTE — LETTER
10/28/2022    Patient: Phebe Goldmann   YOB: 1940   Date of Visit: 10/27/2022     Joan Delgadillo, [address] [facility]  [address]  [address] [facility]  Via In Basket    Dear Joan Delgadillo MD,      Thank you for referring Ms. Yissel Hinojosa to [facility] for evaluation. My notes for this consultation are attached. If you have questions, please do not hesitate to call me. I look forward to following your patient along with you.       Sincerely,    Álvaro Schmidt PsyD

## 2022-10-27 NOTE — PROGRESS NOTES
[address] [facility],[address]  [address]   [place] [address] [place] [address] [facility]   [phone] Office   [phone] Fax      Neuropsychology    Initial Diagnostic Interview Note      Referral:  Louie George MD    Rodney Gifford is a [de-identified] y.o. right handed   female who was accompanied by his spouse to the initial clinical interview on  10/27/22. Please refer to her medical records for details pertaining to her history. At the start of the appointment, I reviewed the patient's [facility] Epic Chart (including Media scanned in from previous providers) for the active Problem List, all pertinent Past Medical Hx, medications, recent radiologic and laboratory findings. In addition, I reviewed pt's documented Immunization Record and Encounter History. 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 No history of previously diagnosed LD and/or receipt of special education services. She feels as though her memory is okay. She can see a word but it doesn't come to her mouth. Her kids tell her that she is hyper and stressed. Some cognitive decline. She cannot sleep. She is on a number of meds. See list. She had cancer 10 years ago. She is on duluxetine, clonazepam, gabapentin, omeprazole, lisinopril. Sleep study was normal. Dad was bipolar. Patient and spouse bicker back and forth today. She is a retired businesswoman and homemaker. They have  61 years and have 4 adult daughters. The family history of manic depression and heart disease. The patient has a history of cancer with residual lymphedema and neuropathy. Family would like assessment of dementia, Parkinson's, manic depression, ADHD. Family has noticed behavioral changes. She has insomnia, high energy level, high pain tolerance. She is generally upbeat is sensitive and tries to hide it.   She has waves of anxiety and will occasionally stumble or fall. She has word-finding difficulties. She sometimes takes the wrong medication at the wrong time and is forgetful. She drives but sometimes gets turned around. No accidents or near misses. She remains independent for medications, finances, day-to-day chores, ADLs. She is agitated by the news. She takes 2 antidepressants for her leg pain. There is a sense of urgency about her to get tasks done. She is compulsively neat. She has RLS issues. She often seems distracted and asks \"what\" when spoken to. She will take any criticism as a personal attack is easily hurt and wants to strike back hard. She often blows up over a comment or just a tone of voice and then seems not to remember that a couple hours later. The target of her angst is still angry and hurt, though. She can be either very active or very bored and cannot sit still and relax. She will finish other sentences because they are too slow, which clearly frustrates the spouse here today. She wants to feel needed and is ready to help and there is a sense of distractibility and tangential nature about her. No previous neuropsych.             Neuropsychological Mental Status Exam (NMSE):      Historian: Good  Praxis: No UE apraxia  R/L Orientation: Intact to self and to other  Dress: within normal limits   Weight: within normal limits   Appearance/Hygiene: within normal limits   Gait: within normal limits   Assistive Devices: None  Mood: within normal limits   Affect: within normal limits   Comprehension: within normal limits   Thought Process: within normal limits   Expressive Language: within normal limits   Receptive Language: within normal limits   Motor:  No cognitive or motor perseveration  ETOH: Denied  Tobacco: Quit many years ago  Illicit: Denied  SI/HI: Denied  Psychosis: Denied  Insight: Within normal limits  Judgment: Within normal limits  Other Psych:      Past Medical History:   Diagnosis Date    Arthritis     Breast cancer ([facility] [address] 2015     left triple neg    Constipation     Irritable bowel syndrome (IBS)     Lymphedema     left arm    Mitral prolapse     Neurological disorder     chemotherapy induced neuropathy    Radiation therapy complication 3217    left breast ca       Past Surgical History:   Procedure Laterality Date    COLONOSCOPY N/A 5/31/2018    COLONOSCOPY performed by Huber Bray MD at [address]  5/31/2018         HX BREAST BIOPSY Left yrs    neg; stereotactic    HX BREAST LUMPECTOMY Left 2015    HX BREAST REDUCTION Bilateral yrs ago    HX CHOLECYSTECTOMY      HX COLONOSCOPY      HX GYN      Hysterectomy    HX ORTHOPAEDIC Right     surgery on the ankle       No Known Allergies    Family History   Problem Relation Age of Onset    Breast Cancer Maternal Grandmother     Heart Disease Mother     Heart Disease Father        Social History     Tobacco Use    Smoking status: Former    Smokeless tobacco: Never   Vaping Use    Vaping Use: Never used   Substance Use Topics    Alcohol use: No    Drug use: No       Current Outpatient Medications   Medication Sig Dispense Refill    lisinopriL (PRINIVIL, ZESTRIL) 2.5 mg tablet Take 2.5 mg by mouth daily. nirmatrelvir-ritonavir (Paxlovid, EUA,) 300 mg (150 mg x 2)-100 mg 3  tabs po bid 1 Box 0    meloxicam (MOBIC) 15 mg tablet TAKE 1 TABLET BY MOUTH EVERY DAY 90 Tablet 1    clonazePAM (KlonoPIN) 0.5 mg tablet Take 1.5  or 2 tab at bedtime as needed. 60 Tablet 3    gabapentin (NEURONTIN) 100 mg capsule 1 po qam and 3 po qhs 120 Capsule 3    omeprazole (PRILOSEC) 20 mg capsule TAKE 1 CAPSULE BY MOUTH ONCE DAILY 30 Capsule 0    linaCLOtide (Linzess) 290 mcg cap capsule Take 1 Capsule by mouth daily. 90 Capsule 0         Plan:  Obtain authorization for testing from insurance company. Report to follow once testing, scoring, and interpretation completed.   ? Organic based neurocognitive issues versus mood disorder or combination of same. ? Problems organic, functional, or both? This note will not be viewable in [address] She wonders if she has bipolar She tends to blow up at low levels of provocation. Could be anxiety, thought?

## 2022-10-28 DIAGNOSIS — K21.9 GASTROESOPHAGEAL REFLUX DISEASE WITHOUT ESOPHAGITIS: ICD-10-CM

## 2022-10-28 RX ORDER — OMEPRAZOLE 20 MG/1
CAPSULE, DELAYED RELEASE ORAL
Qty: 90 CAPSULE | Refills: 1 | Status: SHIPPED | OUTPATIENT
Start: 2022-10-28

## 2022-10-28 NOTE — PROGRESS NOTES
[address] [facility],[address]Th [address]  [address]   [place] [address] [facility]   Blake Black    [phone] Office   [phone] Fax      Neuropsychological Evaluation Report    Referral:  Jarad Groves MD    Jerome Marie is a [de-identified] y.o. right handed   female who was accompanied by his spouse to the initial clinical interview on  10/27/22. Please refer to her medical records for details pertaining to her history. At the start of the appointment, I reviewed the patient's [facility] Epic Chart (including Media scanned in from previous providers) for the active Problem List, all pertinent Past Medical Hx, medications, recent radiologic and laboratory findings. In addition, I reviewed pt's documented Immunization Record and Encounter History. 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 No history of previously diagnosed LD and/or receipt of special education services. She feels as though her memory is okay. She can see a word but it doesn't come to her mouth. Her kids tell her that she is hyper and stressed. Some cognitive decline. She cannot sleep. She is on a number of meds. See list. She had cancer 10 years ago. She is on duluxetine, clonazepam, gabapentin, omeprazole, lisinopril. Sleep study was normal. Dad was bipolar. Patient and spouse bicker back and forth today. She is a retired businesswoman and homemaker. They have  61 years and have 4 adult daughters. The family history of manic depression and heart disease. The patient has a history of cancer with residual lymphedema and neuropathy. Family would like assessment of dementia, Parkinson's, manic depression, ADHD. Family has noticed behavioral changes. She has insomnia, high energy level, high pain tolerance. She is generally upbeat is sensitive and tries to hide it.   She has waves of anxiety and will occasionally stumble or fall. She has word-finding difficulties. She sometimes takes the wrong medication at the wrong time and is forgetful. She drives but sometimes gets turned around. No accidents or near misses. She remains independent for medications, finances, day-to-day chores, ADLs. She is agitated by the news. She takes 2 antidepressants for her leg pain. There is a sense of urgency about her to get tasks done. She is compulsively neat. She has RLS issues. She often seems distracted and asks \"what\" when spoken to. She will take any criticism as a personal attack is easily hurt and wants to strike back hard. She often blows up over a comment or just a tone of voice and then seems not to remember that a couple hours later. The target of her angst is still angry and hurt, though. She can be either very active or very bored and cannot sit still and relax. She will finish other sentences because they are too slow, which clearly frustrates the spouse here today. She wants to feel needed and is ready to help and there is a sense of distractibility and tangential nature about her. No previous neuropsych.       Neuropsychological Mental Status Exam (NMSE):      Historian: Good  Praxis: No UE apraxia  R/L Orientation: Intact to self and to other  Dress: within normal limits   Weight: within normal limits   Appearance/Hygiene: within normal limits   Gait: within normal limits   Assistive Devices: None  Mood: within normal limits   Affect: within normal limits   Comprehension: within normal limits   Thought Process: within normal limits   Expressive Language: within normal limits   Receptive Language: within normal limits   Motor:  No cognitive or motor perseveration  ETOH: Denied  Tobacco: Quit many years ago  Illicit: Denied  SI/HI: Denied  Psychosis: Denied  Insight: Within normal limits  Judgment: Within normal limits  Other Psych:      Past Medical History:   Diagnosis Date    Arthritis Breast cancer ([facility] [address] 2015     left triple neg    Constipation     Irritable bowel syndrome (IBS)     Lymphedema     left arm    Mitral prolapse     Neurological disorder     chemotherapy induced neuropathy    Radiation therapy complication 1986    left breast ca       Past Surgical History:   Procedure Laterality Date    COLONOSCOPY N/A 5/31/2018    COLONOSCOPY performed by Jacob Hurtado MD at [address]  5/31/2018         HX BREAST BIOPSY Left yrs    neg; stereotactic    HX BREAST LUMPECTOMY Left 2015    HX BREAST REDUCTION Bilateral yrs ago    HX CHOLECYSTECTOMY      HX COLONOSCOPY      HX GYN      Hysterectomy    HX ORTHOPAEDIC Right     surgery on the ankle       No Known Allergies    Family History   Problem Relation Age of Onset    Breast Cancer Maternal Grandmother     Heart Disease Mother     Heart Disease Father        Social History     Tobacco Use    Smoking status: Former    Smokeless tobacco: Never   Vaping Use    Vaping Use: Never used   Substance Use Topics    Alcohol use: No    Drug use: No       Current Outpatient Medications   Medication Sig Dispense Refill    omeprazole (PRILOSEC) 20 mg capsule TAKE 1 CAPSULE BY MOUTH EVERY DAY 90 Capsule 1    lisinopriL (PRINIVIL, ZESTRIL) 2.5 mg tablet Take 2.5 mg by mouth daily. nirmatrelvir-ritonavir (Paxlovid, EUA,) 300 mg (150 mg x 2)-100 mg 3  tabs po bid 1 Box 0    meloxicam (MOBIC) 15 mg tablet TAKE 1 TABLET BY MOUTH EVERY DAY 90 Tablet 1    clonazePAM (KlonoPIN) 0.5 mg tablet Take 1.5  or 2 tab at bedtime as needed. 60 Tablet 3    gabapentin (NEURONTIN) 100 mg capsule 1 po qam and 3 po qhs 120 Capsule 3    linaCLOtide (Linzess) 290 mcg cap capsule Take 1 Capsule by mouth daily. 90 Capsule 0         Plan:  Obtain authorization for testing from insurance company. Report to follow once testing, scoring, and interpretation completed. ? Organic based neurocognitive issues versus mood disorder or combination of same.   ? Problems organic, functional, or both? This note will not be viewable in [address] She wonders if she has bipolar She tends to blow up at low levels of provocation. Could be anxiety, though? Neuropsychological Test Results  Patient Testing 10/27/22 Report Completed 10/28/22  A Psychometrist Assisted w/ portions of this evaluation while under my direct  supervision    The following evaluation procedures/tests were administered:      Neuropsychologist Performed, Interpreted, & Reported:  Neuropsychological Mental Status Exam, Revised Memory & Behavior Checklist,  Mini Mental Status Exam, Clock Drawing Test, Lisette-Melzack Pain Questionnaire, Test Of Premorbid Functioning, History Taking  & Clinical Interview With The Patient, Additional History Taking w/ the Patient's Spouse, CASE, ABAS-3, LUCIANA, CPT-III, Review Of Available Records. Psychometrist Administered under Neuropsychologist Supervision & Neuropsychologist Interpreted & Neuropsychologist Reported:  Verbal Fluency Tests, Brian & Brian - Revised, Trailmaking Test Parts A & B, Wechsler Adult Intelligence Scale - IV, [place] Verbal Learning Test - 3, Grooved Pegboard, Arreaga Depression Inventory - II, Arreaga Anxiety Inventory. Test Findings:  Test Findings:  Note:  The patients raw data have been compared with currently available norms which include demographic corrections for age, gender, and/or education. Sometimes, the patients scores are compared to demographically similar individuals as close to the patients age, education level, etc., as possible. \"Average\" is viewed as being +/- 1 standard deviation (SD) from the stated mean for a particular test score. \"Low average\" is viewed as being between 1 and 2 SD below the mean, and above average is viewed as being 1 and 2 SD above the mean.   Scores falling in the borderline range (between 1-1/2 and 2 SD below the mean) are viewed with particular attention as to whether they are normal or abnormal neurocognitive test scores. Other methods of inference in analyzing the test data are also utilized, including the pattern and range of scores in the profile, bilateral motor functions, and the presence, if any, of pathognomonic signs. Behaviorally, the patient was friendly and cooperative and appeared motivated to perform well during this examination. Within this context, the results of this evaluation are viewed as a valid reflection of the patients actual neurocognitive and emotional status. The patient's score of 23/30 on the Mini-Mental Status Exam was impaired. In this regard, she was not oriented to year or date or county. Recall for 3 words after brief delay was 0/3 correct. Repetition was impaired. Visual construction was impaired. Clock drawing was impaired. Her structured word list fluency, as assessed by the FAS Test, was within the mildly to moderately impaired range with a T score of 33. Category fluency was below average with a T score of 43. Confrontation naming, as assessed by the [facility], was within the average range with a T score of 52. This pattern of performance is indicative of a patient is at increased risk for day-to-day problems verbal fluency and confrontation naming is normal.     The patient was administered the [facility] Continuous Performance Test - III and review of the subscales within this instrument revealed numerous concerns for inattentiveness and impulsivity. This pattern of performance is indicative of a patient who is at increased risk for day-to-day problems with sustained visual attention/concentration. The patient is not showing problems with working memory capacity (9th %ile) or processing speed (70th %ile) on the WAIS-IV. Her Verbal Comprehension Index score of 91 was average. Her Perceptual Reasoning Index score of 96 was average.   These scores do not reflect a decline in functioning based on an assessment of premorbid functioning. The patient was administered the [place] Verbal Learning Test  - 3 and generated a normal to above normal range and positive learning curve over 5 repeated auditory word list learning trials. An interference trial was normal.  Recall for the original word list was well within the normal range after both short and long delays. Recognition recall was normal.  Forced choice recall was normal, suggesting good effort. Taken together, this pattern of performance is not indicative of a patient is at increased risk for day-to-day problems with auditory learning or memory. Simple timed visual motor sequencing (Trailmaking Test Part A) was within the average range with a T score of 53 average range with a T score of 45. She made only 2 sequencing errors on this latter completed test.  Taken together, this pattern of performance is not indicative of a patient is at increased risk for day-to-day problems with executive functioning. Fine motor dexterity was within the normal range bilaterally. This does not raise concern for a particularly lateralized brain dysfunction. The patient rated her current level of pain as \"2/5-no pain\" on the Lisette-Melzack Pain Questionnaire. She reported pain in her lower back, knees, and feet. She reported accompanying dizziness constipation and sleep related problems. Her Arreaga Depression Inventory -II score of 18 was within the mildly depressed range. Her Arreaga Anxiety Inventory score of 32 reflected severe anxiety. The patient's responses on the Personality Assessment Inventory were deemed valid for interpretation. Within this context, marked depression is present. She she experiences a decrease in level of sexual interest, loss of appetite, and weight. Disturbance in sleep. She may not recognize her symptoms as cognitive symptoms of depression.   She expresses a great deal of tension, has difficulty relaxing, and likely encounters fatigue as result of high perceived stress. She has elevated and variable mood. Others may see her as impatient and demanding at times. This is consistent with a bipolar 2 diagnosis. Numerous maladaptive behavior patterns aimed at controlling anxiety are present. She may be unsympathetic in her relationships and she tends to be egocentric at times. Self-esteem will fluctuate as a function of her current circumstances. Interpersonally, she is friendly, warm, and sympathetic. Notable day-to-day stress is reported. Relationship problems are likely. Interventions directed at problematic relationships may be of some use in alleviating a major source of stress for her. She reports periodic and transient thoughts of self-harm on this measure and denied currently active plan or intent with me. She is highly motivated for treatment. However, the nature, severity, and complexity of some these issues suggests the treatment will be difficult, with a lengthier treatment process and a higher probability of reversals. Any impassible need to be handled with particular care. Impressions & Recommendations: This is a a mixed normal/abnormal range Neuropsychological Evaluation with respect to neurocognitive functioning. In this regard, she is showing impairments with mental status, sustained attention, and verbal fluency. At the same time, her performances across all other neurocognitive domains assessed, including memory are fully within or above the normal range. From an emotional standpoint, there is support for bipolar 2 as well as rather severe/chronic anxiety. Relational issues which were clearly evident on initial conversation with the patient and her spouse today to serve to further enhance/exacerbate these underlying problems. Thankfully, this profile is not consistent with a dementia type issue.   Instead, this is MCI exacerbated by significant emotional distress and underlying ADHD-inattentive concerns. I have the patient is reassured that at this point in time there is no evidence of Alzheimer's disease. In addition to continued medical care, my recommendations include a review of her current psychiatric medication management for bipolar 2 and anxiety along with consideration for an appropriate medication for attention if is not medically contraindicated. Caution is advised in selecting same, given the anxiety here. Active engagement in individual and family counseling is strongly advised. I am not currently concerned about competency/capacity. Driving safety needs to be monitored over time. The patient should be encouraged to remain as mentally, physically, and socially active as possible. I like to follow up with her in 1 year to track any changes and make additional treatment recommendations from there. Monitor for any escalation in currently passive suicidal thinking. We now have extensive baseline neurocognitive and psychologic data on her. Clinical correlation is, of course, indicated. I will discuss these findings with the patient when she follows up with me in the near future. A follow up Neuropsychological Evaluation is indicated on a prn basis, especially if there are any cognitive and/or emotional changes. 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 If there is any additional information of which I am currently unaware, I would be more than happy to review it upon having it made available to me. Thank you for the opportunity to see this interesting individual.     Sincerely,       Maria R Gardner.  Shayy López PsyD, EdS    CC:  Corina Perea MD    Time Documentation:    67145*9 43967*6 (60 minutes)    44847 x 1  96139 x 5 Test Administration/Data Gathering By Technician: (3 hours). 75804 x 1 (first 30 minutes), 68303 x 5 (each additional 30 minutes)    96132 x 1  96133 x 1 Testing Evaluation Services by Neuropsychologist (1 hour, 50 minutes) 96132 x 1 (first hour), 96133 x 1 (50 minutes)    Definitions:      74940/80093:  Neurobehavioral Status Exam, Clinical interview. Clinical assessment of thinking, reasoning and judgment, by neuropsychologist, both face to face time with patient and time interpreting those test results and reporting, first and subsequent hours)    75456/30373: Neuropsychological Test Administration by Technician/Psychometrist, first 30 minutes and each additional 30 minutes. The above includes: Record review. Review of history provided by patient. Review of collaborative information. Testing by Clinician. Review of raw data. Scoring. Report writing of individual tests administered by Clinician. Integration of individual tests administered by psychometrist with NSE/testing by clinician, review of records/history/collaborative information, case Conceptualization, treatment planning, clinical decision making, report writing, coordination Of Care. Psychometry test codes as time spent by psychometrist administering and scoring neurocognitive/psychological tests under supervision of neuropsychologist.  Integral services including scoring of raw data, data interpretation, case conceptualization, report writing etcetera were initiated after the patient finished testing/raw data collected and was completed on the date the report was signed. Please note that this dictation was completed with [employer], the [employer] voice recognition software. Quite often unanticipated grammatical, syntax, homophones, and other interpretive errors are inadvertently transcribed by the computer software. Please disregard these errors. Please excuse any errors that have escaped final proofreading. Thank you.

## 2022-10-29 ENCOUNTER — TELEPHONE (OUTPATIENT)
Dept: INTERNAL MEDICINE CLINIC | Age: 82
End: 2022-10-29

## 2022-10-29 DIAGNOSIS — G62.0 CHEMOTHERAPY-INDUCED PERIPHERAL NEUROPATHY (HCC): ICD-10-CM

## 2022-10-29 DIAGNOSIS — T45.1X5A CHEMOTHERAPY-INDUCED PERIPHERAL NEUROPATHY (HCC): ICD-10-CM

## 2022-10-29 RX ORDER — GABAPENTIN 100 MG/1
100 CAPSULE ORAL 3 TIMES DAILY
Status: CANCELLED | OUTPATIENT
Start: 2022-10-29

## 2022-10-30 RX ORDER — DULOXETIN HYDROCHLORIDE 60 MG/1
CAPSULE, DELAYED RELEASE ORAL
Qty: 30 CAPSULE | Refills: 1 | Status: SHIPPED | OUTPATIENT
Start: 2022-10-30 | End: 2022-11-03 | Stop reason: SDUPTHER

## 2022-10-30 RX ORDER — GABAPENTIN 100 MG/1
CAPSULE ORAL
Qty: 120 CAPSULE | Refills: 1 | Status: SHIPPED | OUTPATIENT
Start: 2022-10-30 | End: 2022-11-03

## 2022-10-31 NOTE — TELEPHONE ENCOUNTER
Message per provider: Please notify I did refill her meds for 2 months-needs appt in early dec for ongoing med refills needs in office appt thanks

## 2022-11-01 DIAGNOSIS — T45.1X5A CHEMOTHERAPY-INDUCED PERIPHERAL NEUROPATHY (HCC): ICD-10-CM

## 2022-11-01 DIAGNOSIS — G62.0 CHEMOTHERAPY-INDUCED PERIPHERAL NEUROPATHY (HCC): ICD-10-CM

## 2022-11-01 RX ORDER — GABAPENTIN 100 MG/1
CAPSULE ORAL
Qty: 120 CAPSULE | Refills: 1 | OUTPATIENT
Start: 2022-11-01

## 2022-11-03 DIAGNOSIS — G62.0 CHEMOTHERAPY-INDUCED PERIPHERAL NEUROPATHY (HCC): ICD-10-CM

## 2022-11-03 DIAGNOSIS — T45.1X5A CHEMOTHERAPY-INDUCED PERIPHERAL NEUROPATHY (HCC): ICD-10-CM

## 2022-11-03 RX ORDER — GABAPENTIN 100 MG/1
CAPSULE ORAL
Qty: 120 CAPSULE | Refills: 1 | Status: SHIPPED | OUTPATIENT
Start: 2022-11-03

## 2022-11-03 RX ORDER — DULOXETIN HYDROCHLORIDE 60 MG/1
CAPSULE, DELAYED RELEASE ORAL
Qty: 30 CAPSULE | Refills: 1 | Status: SHIPPED | OUTPATIENT
Start: 2022-11-03 | End: 2022-11-28 | Stop reason: SDUPTHER

## 2022-11-03 NOTE — TELEPHONE ENCOUNTER
Per patient's  please re-send in her gabapentin , per pharmacy script was not received, states she is almost out of her 7 day temp supply from the pharmacist.

## 2022-11-16 ENCOUNTER — OFFICE VISIT (OUTPATIENT)
Dept: NEUROLOGY | Age: 82
End: 2022-11-16
Payer: MEDICARE

## 2022-11-16 DIAGNOSIS — F90.0 ATTENTION DEFICIT HYPERACTIVITY DISORDER (ADHD), INATTENTIVE TYPE, MODERATE: ICD-10-CM

## 2022-11-16 DIAGNOSIS — G31.84 MILD COGNITIVE IMPAIRMENT: Primary | ICD-10-CM

## 2022-11-16 DIAGNOSIS — R45.86 MOOD SWINGS: ICD-10-CM

## 2022-11-16 DIAGNOSIS — Z63.0 MARITAL PROBLEM: ICD-10-CM

## 2022-11-16 DIAGNOSIS — F41.9 CHRONIC ANXIETY: ICD-10-CM

## 2022-11-16 DIAGNOSIS — F31.81 BIPOLAR II DISORDER, MODERATE, DEPRESSED, WITH ANXIOUS DISTRESS (HCC): ICD-10-CM

## 2022-11-16 PROCEDURE — 90832 PSYTX W PT 30 MINUTES: CPT | Performed by: CLINICAL NEUROPSYCHOLOGIST

## 2022-11-16 PROCEDURE — 1123F ACP DISCUSS/DSCN MKR DOCD: CPT | Performed by: CLINICAL NEUROPSYCHOLOGIST

## 2022-11-16 SDOH — SOCIAL STABILITY - SOCIAL INSECURITY: PROBLEMS IN RELATIONSHIP WITH SPOUSE OR PARTNER: Z63.0

## 2022-11-16 NOTE — PROGRESS NOTES
Prior to seeing the patient I reviewed the records, including the previously completed report, the records in [place], and any updated visits from other providers since I saw the patient last.      Today, I engaged in a psychoeducational and supportive and cognitive/behavioral psychotherapy session with the patient and spouse and daughter. I provided psychotherapy in the form of psychoeducation and support with respect to the results of the recent Neuropsychological Evaluation, including discussing individual tests as well as patient's areas of neurocognitive strength versus weakness. We discussed, in detail, the following: This is a a mixed normal/abnormal range Neuropsychological Evaluation with respect to neurocognitive functioning. In this regard, she is showing impairments with mental status, sustained attention, and verbal fluency. At the same time, her performances across all other neurocognitive domains assessed, including memory are fully within or above the normal range. From an emotional standpoint, there is support for bipolar 2 as well as rather severe/chronic anxiety. Relational issues which were clearly evident on initial conversation with the patient and her spouse today to serve to further enhance/exacerbate these underlying problems. Thankfully, this profile is not consistent with a dementia type issue. Instead, this is MCI exacerbated by significant emotional distress and underlying ADHD-inattentive concerns. I have the patient is reassured that at this point in time there is no evidence of Alzheimer's disease. In addition to continued medical care, my recommendations include a review of her current psychiatric medication management for bipolar 2 and anxiety along with consideration for an appropriate medication for attention if is not medically contraindicated. Caution is advised in selecting same, given the anxiety here.   Active engagement in individual and family counseling is strongly advised. I am not currently concerned about competency/capacity. Driving safety needs to be monitored over time. The patient should be encouraged to remain as mentally, physically, and socially active as possible. I like to follow up with her in 1 year to track any changes and make additional treatment recommendations from there. Monitor for any escalation in currently passive suicidal thinking. We now have extensive baseline neurocognitive and psychologic data on her. Clinical correlation is, of course, indicated. I will discuss these findings with the patient when she follows up with me in the near future. A follow up Neuropsychological Evaluation is indicated on a prn basis, especially if there are any cognitive and/or emotional changes. 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 I also answered numerous questions related to the clinical findings, including discussing various methods to improve cognition and mood. Counseling provided regarding mood and cognition. CBT and supportive psychotherapy techniques were utilized. Supportive/Cognitive Behavioral/Solution Focused psychotherapy provided  Discussed rational versus irrational thinking patterns and their consequences. Discussed healthy/adaptive and unhealthy/maladaptive coping. The patient needs to follow with psychiatry, psychology as needed.  Counseling for individual and couples advised    The patient had the following concerns which I deferred to their referring provider: meds for mood/cognition      Time spent today: 20

## 2022-11-18 ENCOUNTER — OFFICE VISIT (OUTPATIENT)
Dept: SLEEP MEDICINE | Age: 82
End: 2022-11-18
Payer: MEDICARE

## 2022-11-18 VITALS
BODY MASS INDEX: 25.81 KG/M2 | WEIGHT: 136.7 LBS | HEART RATE: 91 BPM | HEIGHT: 61 IN | OXYGEN SATURATION: 96 % | DIASTOLIC BLOOD PRESSURE: 79 MMHG | SYSTOLIC BLOOD PRESSURE: 122 MMHG

## 2022-11-18 DIAGNOSIS — G47.00 INSOMNIA, UNSPECIFIED TYPE: Primary | ICD-10-CM

## 2022-11-18 PROCEDURE — G8417 CALC BMI ABV UP PARAM F/U: HCPCS | Performed by: SPECIALIST

## 2022-11-18 PROCEDURE — G8536 NO DOC ELDER MAL SCRN: HCPCS | Performed by: SPECIALIST

## 2022-11-18 PROCEDURE — 99213 OFFICE O/P EST LOW 20 MIN: CPT | Performed by: SPECIALIST

## 2022-11-18 PROCEDURE — 1090F PRES/ABSN URINE INCON ASSESS: CPT | Performed by: SPECIALIST

## 2022-11-18 PROCEDURE — 1101F PT FALLS ASSESS-DOCD LE1/YR: CPT | Performed by: SPECIALIST

## 2022-11-18 PROCEDURE — G8432 DEP SCR NOT DOC, RNG: HCPCS | Performed by: SPECIALIST

## 2022-11-18 PROCEDURE — G8427 DOCREV CUR MEDS BY ELIG CLIN: HCPCS | Performed by: SPECIALIST

## 2022-11-18 PROCEDURE — G8754 DIAS BP LESS 90: HCPCS | Performed by: SPECIALIST

## 2022-11-18 PROCEDURE — G8399 PT W/DXA RESULTS DOCUMENT: HCPCS | Performed by: SPECIALIST

## 2022-11-18 PROCEDURE — G8752 SYS BP LESS 140: HCPCS | Performed by: SPECIALIST

## 2022-11-18 PROCEDURE — 1123F ACP DISCUSS/DSCN MKR DOCD: CPT | Performed by: SPECIALIST

## 2022-11-18 NOTE — PROGRESS NOTES
[address] [facility] [place]. [place], [address]  Tel.  [phone]  Fax. 100 [facility] [address]  [place], [address] [facility]  Tel.  [phone]  Fax. [phone] [address] [facility] [address]   Tel.  [phone]  Fax. [phone]         Chief Complaint       Chief Complaint   Patient presents with    Sleep Problem     3 month follow up         HPI        Cortney Pena is a [de-identified] y.o. female seen for follow-up. Had presented with a long history of sleeping difficulties stating that she \"cannot sleep\". Normally retiresd at midnight and will often get out of bed between 1-5 AM.  Watches TV at that time. May not return back to bed until 5 AM and sleeps until 1-2 PM.     Notes having had \"several issues\" with medications. Nonpharmacologic treatments discussed. Sleep hygiene measures reviewed. Trial of clonazepam 0.5 mg, take one half initially. On clonazepam 0.5 mg was sleeping approximately 5 hours. Notes that abnormal leg movements have become less prominent since starting clonazepam.  Feel that she is still \"not sleeping\". She describes her self as Amber Mikki been a good sleeper. She stopped clonazepam; states Neuro had suggested Psych evaluation. Currently seeing Neuropsychology. No Known Allergies    Current Outpatient Medications   Medication Sig Dispense Refill    gabapentin (NEURONTIN) 100 mg capsule Take 1 cap by mouth every a.m & 3 caps by mouth at bedtime 120 Capsule 1    DULoxetine (CYMBALTA) 60 mg capsule TAKE 1 CAPSULE BY MOUTH EVERY DAY WITH 30 MG CAPSULE 30 Capsule 1    omeprazole (PRILOSEC) 20 mg capsule TAKE 1 CAPSULE BY MOUTH EVERY DAY 90 Capsule 1    lisinopriL (PRINIVIL, ZESTRIL) 2.5 mg tablet Take 2.5 mg by mouth daily.       nirmatrelvir-ritonavir (Paxlovid, EUA,) 300 mg (150 mg x 2)-100 mg 3  tabs po bid 1 Box 0    meloxicam (MOBIC) 15 mg tablet TAKE 1 TABLET BY MOUTH EVERY DAY 90 Tablet 1    linaCLOtide (Linzess) 290 mcg cap capsule Take 1 Capsule by mouth daily. 90 Capsule 0    clonazePAM (KlonoPIN) 0.5 mg tablet Take 1.5  or 2 tab at bedtime as needed. 60 Tablet 3        She  has a past medical history of Arthritis, Breast cancer ([address] (2015), Constipation, Irritable bowel syndrome (IBS), Lymphedema, Mitral prolapse, Neurological disorder, and Radiation therapy complication (4537). She has no past medical history of Arrhythmia, Autoimmune disease ([address] or Sleep apnea. She  has a past surgical history that includes hx gyn; hx orthopaedic (Right); hx cholecystectomy; hx colonoscopy; colonoscopy,diagnostic (5/31/2018); colonoscopy (N/A, 5/31/2018); hx breast biopsy (Left, yrs); hx breast lumpectomy (Left, 2015); and hx breast reduction (Bilateral, yrs ago). She family history includes Breast Cancer in her maternal grandmother; Heart Disease in her father and mother. She  reports that she has quit smoking. She has never used smokeless tobacco. She reports that she does not drink alcohol and does not use drugs. Review of Systems:  Unchanged per patient      Objective:   Visit Vitals  /79 (BP 1 Location: Right upper arm, BP Patient Position: Sitting)   Pulse 91   Ht 5' 1\" (1.549 m)   Wt 136 lb 11.2 oz (62 kg)   SpO2 96%   BMI 25.83 kg/m²     Body mass index is 25.83 kg/m². General:   Conversant, cooperative                                Neuro:  Speech fluent, face symmetrical             Assessment:       ICD-10-CM ICD-9-CM    1. Insomnia, unspecified type  G47.00 780.52         Non-pharmacologic treatments discussed. Plan:   No orders of the defined types were placed in this encounter. * Treatment options if indicated were reviewed today. Guerline Castro MD, [facility]  Electronically signed 11/19/22        This note was created using voice recognition software. Despite editing, there may be syntax errors. This note will not be viewable in [address]

## 2022-11-22 ENCOUNTER — OFFICE VISIT (OUTPATIENT)
Dept: INTERNAL MEDICINE CLINIC | Age: 82
End: 2022-11-22
Payer: MEDICARE

## 2022-11-22 VITALS
WEIGHT: 135 LBS | RESPIRATION RATE: 16 BRPM | OXYGEN SATURATION: 98 % | TEMPERATURE: 97.4 F | DIASTOLIC BLOOD PRESSURE: 78 MMHG | HEART RATE: 79 BPM | SYSTOLIC BLOOD PRESSURE: 126 MMHG | BODY MASS INDEX: 25.49 KG/M2 | HEIGHT: 61 IN

## 2022-11-22 DIAGNOSIS — E78.5 DYSLIPIDEMIA: ICD-10-CM

## 2022-11-22 DIAGNOSIS — G31.84 MCI (MILD COGNITIVE IMPAIRMENT): ICD-10-CM

## 2022-11-22 DIAGNOSIS — F51.04 CHRONIC INSOMNIA: ICD-10-CM

## 2022-11-22 DIAGNOSIS — F41.9 ANXIETY: ICD-10-CM

## 2022-11-22 DIAGNOSIS — T45.1X5A CHEMOTHERAPY-INDUCED PERIPHERAL NEUROPATHY (HCC): ICD-10-CM

## 2022-11-22 DIAGNOSIS — I10 HTN, GOAL BELOW 140/80: Primary | ICD-10-CM

## 2022-11-22 DIAGNOSIS — G62.0 CHEMOTHERAPY-INDUCED PERIPHERAL NEUROPATHY (HCC): ICD-10-CM

## 2022-11-22 PROBLEM — F41.1 GENERALIZED ANXIETY DISORDER: Status: ACTIVE | Noted: 2022-11-22

## 2022-11-22 LAB
ALBUMIN SERPL-MCNC: 3.9 G/DL (ref 3.5–5)
ALBUMIN/GLOB SERPL: 1.1 {RATIO} (ref 1.1–2.2)
ALP SERPL-CCNC: 95 U/L (ref 45–117)
ALT SERPL-CCNC: 23 U/L (ref 12–78)
ANION GAP SERPL CALC-SCNC: 6 MMOL/L (ref 5–15)
AST SERPL-CCNC: 19 U/L (ref 15–37)
BASOPHILS # BLD: 0.1 K/UL (ref 0–0.1)
BASOPHILS NFR BLD: 2 % (ref 0–1)
BILIRUB SERPL-MCNC: 0.4 MG/DL (ref 0.2–1)
BUN SERPL-MCNC: 32 MG/DL (ref 6–20)
BUN/CREAT SERPL: 24 (ref 12–20)
CALCIUM SERPL-MCNC: 9.5 MG/DL (ref 8.5–10.1)
CHLORIDE SERPL-SCNC: 106 MMOL/L (ref 97–108)
CHOLEST SERPL-MCNC: 250 MG/DL
CO2 SERPL-SCNC: 25 MMOL/L (ref 21–32)
COMMENT, HOLDF: NORMAL
CREAT SERPL-MCNC: 1.34 MG/DL (ref 0.55–1.02)
DIFFERENTIAL METHOD BLD: ABNORMAL
EOSINOPHIL # BLD: 0.5 K/UL (ref 0–0.4)
EOSINOPHIL NFR BLD: 8 % (ref 0–7)
ERYTHROCYTE [DISTWIDTH] IN BLOOD BY AUTOMATED COUNT: 14.2 % (ref 11.5–14.5)
GLOBULIN SER CALC-MCNC: 3.4 G/DL (ref 2–4)
GLUCOSE SERPL-MCNC: 87 MG/DL (ref 65–100)
HCT VFR BLD AUTO: 37.6 % (ref 35–47)
HDLC SERPL-MCNC: 74 MG/DL
HDLC SERPL: 3.4 {RATIO} (ref 0–5)
HGB BLD-MCNC: 12.2 G/DL (ref 11.5–16)
IMM GRANULOCYTES # BLD AUTO: 0 K/UL (ref 0–0.04)
IMM GRANULOCYTES NFR BLD AUTO: 0 % (ref 0–0.5)
LDLC SERPL CALC-MCNC: 140 MG/DL (ref 0–100)
LYMPHOCYTES # BLD: 1.3 K/UL (ref 0.8–3.5)
LYMPHOCYTES NFR BLD: 21 % (ref 12–49)
MCH RBC QN AUTO: 28.8 PG (ref 26–34)
MCHC RBC AUTO-ENTMCNC: 32.4 G/DL (ref 30–36.5)
MCV RBC AUTO: 88.9 FL (ref 80–99)
MONOCYTES # BLD: 0.7 K/UL (ref 0–1)
MONOCYTES NFR BLD: 11 % (ref 5–13)
NEUTS SEG # BLD: 3.5 K/UL (ref 1.8–8)
NEUTS SEG NFR BLD: 58 % (ref 32–75)
NRBC # BLD: 0 K/UL (ref 0–0.01)
NRBC BLD-RTO: 0 PER 100 WBC
PLATELET # BLD AUTO: 298 K/UL (ref 150–400)
PMV BLD AUTO: 9.7 FL (ref 8.9–12.9)
POTASSIUM SERPL-SCNC: 4.5 MMOL/L (ref 3.5–5.1)
PROT SERPL-MCNC: 7.3 G/DL (ref 6.4–8.2)
RBC # BLD AUTO: 4.23 M/UL (ref 3.8–5.2)
SAMPLES BEING HELD,HOLD: NORMAL
SODIUM SERPL-SCNC: 137 MMOL/L (ref 136–145)
TRIGL SERPL-MCNC: 180 MG/DL (ref ?–150)
TSH SERPL DL<=0.05 MIU/L-ACNC: 1.6 UIU/ML (ref 0.36–3.74)
VIT B12 SERPL-MCNC: 656 PG/ML (ref 193–986)
VLDLC SERPL CALC-MCNC: 36 MG/DL
WBC # BLD AUTO: 6 K/UL (ref 3.6–11)

## 2022-11-22 PROCEDURE — G8752 SYS BP LESS 140: HCPCS | Performed by: INTERNAL MEDICINE

## 2022-11-22 PROCEDURE — 99214 OFFICE O/P EST MOD 30 MIN: CPT | Performed by: INTERNAL MEDICINE

## 2022-11-22 PROCEDURE — G8432 DEP SCR NOT DOC, RNG: HCPCS | Performed by: INTERNAL MEDICINE

## 2022-11-22 PROCEDURE — G8536 NO DOC ELDER MAL SCRN: HCPCS | Performed by: INTERNAL MEDICINE

## 2022-11-22 PROCEDURE — 1090F PRES/ABSN URINE INCON ASSESS: CPT | Performed by: INTERNAL MEDICINE

## 2022-11-22 PROCEDURE — G0463 HOSPITAL OUTPT CLINIC VISIT: HCPCS | Performed by: INTERNAL MEDICINE

## 2022-11-22 PROCEDURE — G8399 PT W/DXA RESULTS DOCUMENT: HCPCS | Performed by: INTERNAL MEDICINE

## 2022-11-22 PROCEDURE — G8417 CALC BMI ABV UP PARAM F/U: HCPCS | Performed by: INTERNAL MEDICINE

## 2022-11-22 PROCEDURE — G8754 DIAS BP LESS 90: HCPCS | Performed by: INTERNAL MEDICINE

## 2022-11-22 PROCEDURE — G8427 DOCREV CUR MEDS BY ELIG CLIN: HCPCS | Performed by: INTERNAL MEDICINE

## 2022-11-22 PROCEDURE — 1101F PT FALLS ASSESS-DOCD LE1/YR: CPT | Performed by: INTERNAL MEDICINE

## 2022-11-22 NOTE — PROGRESS NOTES
HISTORY OF PRESENT ILLNESS  Ramses Verduzco is a [de-identified] y.o. female. HPI  Since our last visit she has had neuropsych evaluation with Dr. Shayy López, who diagnosed mild cognitive impairment, generalized anxiety and bipolar 2. She has been working with Dr. Maddie Phillips for her longstanding sleep trouble. Tried Klonopin, which apparently helped with her neuropathy symptoms, but she feels it did not help her sleep. On her own she dropped Gabapentin from a total of 300 to 200, fearful of combining meds. Still taking a total of 90 of Duloxetine. Notes stress with 's recent diagnosis of stage IV lung cancer. Continues to struggle with sleep. Has not had any dark thoughts. Discussed psychiatry consult for med management. Discussed continuing Gabapentin at 300 as she is going to come off Klonopin. Discussed blood pressure is perfectly controlled on Lisinopril 2.5. Review of Systems   Constitutional:  Positive for malaise/fatigue. Negative for chills, fever and weight loss. Respiratory:  Negative for cough, shortness of breath and wheezing. Cardiovascular:  Negative for chest pain, palpitations, orthopnea, leg swelling and PND. Gastrointestinal:  Negative for heartburn and nausea. Musculoskeletal:  Negative for myalgias. Neurological:  Negative for dizziness and headaches. Psychiatric/Behavioral:  Negative for depression, substance abuse and suicidal ideas. The patient is nervous/anxious and has insomnia. Physical Exam  Vitals and nursing note reviewed. Constitutional:       Appearance: She is well-developed. HENT:      Head: Normocephalic and atraumatic. Neck:      Thyroid: No thyromegaly. Vascular: No carotid bruit. Cardiovascular:      Rate and Rhythm: Normal rate and regular rhythm. Heart sounds: Normal heart sounds, S1 normal and S2 normal. No murmur heard. Pulmonary:      Effort: Pulmonary effort is normal. No respiratory distress.       Breath sounds: Normal breath sounds. No wheezing or rales. Musculoskeletal:      Cervical back: Normal range of motion and neck supple. Neurological:      Mental Status: She is alert and oriented to person, place, and time. Psychiatric:         Behavior: Behavior normal.       ASSESSMENT and PLAN  Diagnoses and all orders for this visit:    1. HTN, goal below 140/80    2. Chemotherapy-induced peripheral neuropathy (HCC)  -     CBC WITH AUTOMATED DIFF; Future  -     TSH 3RD GENERATION; Future  -     VITAMIN B12; Future    3. Chronic insomnia    4. Anxiety    5. MCI (mild cognitive impairment)    6. Dyslipidemia  -     METABOLIC PANEL, COMPREHENSIVE; Future  -     LIPID PANEL; Future      the following changes in treatment are made: she is tapering off klonopin so advised cont t he gabapentin at 300 mg per day to help with nerve pain  See psychiatry for med eval for ?  Bipolar and long standing sleep trouble  Appt in 6mo

## 2022-11-28 RX ORDER — DULOXETIN HYDROCHLORIDE 60 MG/1
60 CAPSULE, DELAYED RELEASE ORAL DAILY
Qty: 90 CAPSULE | Refills: 1 | Status: SHIPPED | OUTPATIENT
Start: 2022-11-28

## 2022-12-27 ENCOUNTER — TELEPHONE (OUTPATIENT)
Dept: INTERNAL MEDICINE CLINIC | Age: 82
End: 2022-12-27

## 2022-12-27 NOTE — TELEPHONE ENCOUNTER
Coridra Starkey () walked into office to say that she is not taking the lisinopriL anymore. The other doctor that Dr. Juan A Loving recommended to them for Psychiatry told her to stop taking it, so she did. Patient would also like to know how to la herself off of DULoxetine, the 60mg. Cori Starkey also gave a financial aid form for Linzess. He would like it signed and he would like a call so he can pick it back up.

## 2023-01-04 ENCOUNTER — TELEPHONE (OUTPATIENT)
Dept: INTERNAL MEDICINE CLINIC | Age: 83
End: 2023-01-04

## 2023-01-04 NOTE — TELEPHONE ENCOUNTER
Pt would like to discuss one of her medications and needs clarification after receiving lab results. Please call [phone].     Thank you

## 2023-01-05 NOTE — TELEPHONE ENCOUNTER
Patient received her lab results report from Nov & in the comments it was noted she needed to minimize use of the mobic due to her cr level. Pt questions how should she dose it. Discussed should now be taken maybe 2-3 times a week prn for pain. Patient voiced understanding & was thankful.

## 2023-01-12 ENCOUNTER — TELEPHONE (OUTPATIENT)
Dept: INTERNAL MEDICINE CLINIC | Age: 83
End: 2023-01-12

## 2023-01-12 NOTE — TELEPHONE ENCOUNTER
Patient scheduled to see PCP tomorrow morning at 8:30 to discuss her medications. Patient made aware & was thankful.

## 2023-01-12 NOTE — TELEPHONE ENCOUNTER
Patient called in via the [address] [place] [address] and would like a call back to discuss her medications. Patient would also like to know if she can be worked in for a medication check before 1/24. Patient is leaving with her  to go on a cruise for their 60th wedding anniversary. Patient has some medication and blood pressure concerns she would like to discuss with her PCP before leaving. Please call back and advise.

## 2023-01-13 ENCOUNTER — OFFICE VISIT (OUTPATIENT)
Dept: INTERNAL MEDICINE CLINIC | Age: 83
End: 2023-01-13
Payer: MEDICARE

## 2023-01-13 VITALS
BODY MASS INDEX: 25.9 KG/M2 | WEIGHT: 137.2 LBS | TEMPERATURE: 97.4 F | DIASTOLIC BLOOD PRESSURE: 73 MMHG | OXYGEN SATURATION: 98 % | HEART RATE: 93 BPM | RESPIRATION RATE: 16 BRPM | HEIGHT: 61 IN | SYSTOLIC BLOOD PRESSURE: 120 MMHG

## 2023-01-13 DIAGNOSIS — G62.0 CHEMOTHERAPY-INDUCED PERIPHERAL NEUROPATHY (HCC): ICD-10-CM

## 2023-01-13 DIAGNOSIS — F31.81 BIPOLAR II DISORDER, MODERATE, DEPRESSED, WITH ANXIOUS DISTRESS (HCC): ICD-10-CM

## 2023-01-13 DIAGNOSIS — C50.112 MALIGNANT NEOPLASM OF CENTRAL PORTION OF LEFT FEMALE BREAST, UNSPECIFIED ESTROGEN RECEPTOR STATUS (HCC): ICD-10-CM

## 2023-01-13 DIAGNOSIS — I10 HTN, GOAL BELOW 140/80: Primary | ICD-10-CM

## 2023-01-13 DIAGNOSIS — T45.1X5A CHEMOTHERAPY-INDUCED PERIPHERAL NEUROPATHY (HCC): ICD-10-CM

## 2023-01-13 DIAGNOSIS — K21.9 GASTROESOPHAGEAL REFLUX DISEASE WITHOUT ESOPHAGITIS: ICD-10-CM

## 2023-01-13 DIAGNOSIS — S93.401A MODERATE RIGHT ANKLE SPRAIN, INITIAL ENCOUNTER: ICD-10-CM

## 2023-01-13 RX ORDER — GABAPENTIN 100 MG/1
CAPSULE ORAL
Qty: 120 CAPSULE | Refills: 3 | Status: SHIPPED | OUTPATIENT
Start: 2023-01-13

## 2023-01-13 RX ORDER — LAMOTRIGINE 25 MG/1
25 TABLET ORAL DAILY
COMMUNITY

## 2023-01-13 RX ORDER — OMEPRAZOLE 20 MG/1
20 CAPSULE, DELAYED RELEASE ORAL DAILY
Qty: 90 CAPSULE | Refills: 1 | Status: SHIPPED | OUTPATIENT
Start: 2023-01-13

## 2023-01-13 RX ORDER — TRAZODONE HYDROCHLORIDE 50 MG/1
50 TABLET ORAL
COMMUNITY

## 2023-01-13 NOTE — PROGRESS NOTES
HISTORY OF PRESENT ILLNESS  Alonso Dsouza is a [de-identified] y.o. female. HPI  Will be going on a cruise for her 61th wedding anniversary and has been advised to bring a month of medicine just in case they get stuck at sea. She is now working with Dr. Brian Thapa, her psychiatrist, who is treating her with Lamictal 25 daily, about to titrate up to b.i.d., on Duloxetine 60 and Trazodone at night, and notes she feels better than she has in years. Glad that now she has been given the diagnosis of bipolar 2 and does feel that the current meds are really helping her sleep. Did recently sprain her ankle and has seen ortho. Trying to baby it until she can have knee replacement in the spring. Back on Gabapentin 100 in the morning, 300 at night, which really helps with her neuropathy, and would like to stay at this dose. Was advised to come off of Lisinopril 2.5 because of labile blood pressure and dizziness, fear of falls. She does feel less dizzy off the Lisinopril and in my office blood pressure looks good. Review of Systems   Constitutional:  Negative for chills, diaphoresis, fever and weight loss. Respiratory:  Negative for cough, shortness of breath and wheezing. Cardiovascular:  Negative for chest pain, palpitations, orthopnea, leg swelling and PND. Gastrointestinal:  Negative for abdominal pain, diarrhea, heartburn and nausea. Musculoskeletal:  Positive for joint pain. Negative for myalgias. Neurological:  Negative for dizziness and headaches. Psychiatric/Behavioral:  Negative for depression. The patient is not nervous/anxious and does not have insomnia. Physical Exam  Vitals and nursing note reviewed. Constitutional:       Appearance: She is well-developed. HENT:      Head: Normocephalic and atraumatic. Neck:      Thyroid: No thyromegaly. Vascular: No carotid bruit. Cardiovascular:      Rate and Rhythm: Normal rate and regular rhythm.       Heart sounds: Normal heart sounds, S1 normal and S2 normal. No murmur heard. Pulmonary:      Effort: Pulmonary effort is normal. No respiratory distress. Breath sounds: Normal breath sounds. No wheezing or rales. Musculoskeletal:      Cervical back: Normal range of motion and neck supple. Neurological:      Mental Status: She is alert and oriented to person, place, and time. Psychiatric:         Behavior: Behavior normal.       ASSESSMENT and PLAN  Diagnoses and all orders for this visit:    1. HTN, goal below 140/80-now off bp med  Advised if sbp over 160 for 3 days needs to resume lisinopril 2.5 mg    2. Chemotherapy-induced peripheral neuropathy (HCC)  -     gabapentin (NEURONTIN) 100 mg capsule; Take 1 cap by mouth every a.m & 3 caps by mouth at bedtime    3. Bipolar II disorder, moderate, depressed, with anxious distress (HCC)-better on  lamictal and will work with dr Salo Baron on titrating down on cymbalta    4. Malignant neoplasm of central portion of left female breast, unspecified estrogen receptor status ([facility] [address]    5. Gastroesophageal reflux disease without esophagitis  -     omeprazole (PRILOSEC) 20 mg capsule; Take 1 Capsule by mouth daily.     6. Moderate right ankle sprain, initial encounter

## 2023-01-26 NOTE — TELEPHONE ENCOUNTER
Patient is doing well and continues on life-long coumadin therapy.  No bleeding issues and he overall looks ok.  Will continue therapy and continue to see him yearly.  Discussed this today.    Please call patient back. Her  called in with some questions regarding a form he wanted to know if the doctor would be able to fill out for him.

## 2023-05-15 DIAGNOSIS — G62.0 DRUG-INDUCED POLYNEUROPATHY (HCC): ICD-10-CM

## 2023-05-15 RX ORDER — GABAPENTIN 100 MG/1
CAPSULE ORAL
Qty: 120 CAPSULE | Refills: 0 | Status: SHIPPED | OUTPATIENT
Start: 2023-05-15 | End: 2023-06-14

## 2023-05-22 ENCOUNTER — OFFICE VISIT (OUTPATIENT)
Age: 83
End: 2023-05-22
Payer: MEDICARE

## 2023-05-22 VITALS
TEMPERATURE: 97.8 F | WEIGHT: 139.4 LBS | OXYGEN SATURATION: 94 % | HEIGHT: 61 IN | BODY MASS INDEX: 26.32 KG/M2 | SYSTOLIC BLOOD PRESSURE: 117 MMHG | RESPIRATION RATE: 16 BRPM | DIASTOLIC BLOOD PRESSURE: 74 MMHG | HEART RATE: 84 BPM

## 2023-05-22 DIAGNOSIS — T45.1X5A CHEMOTHERAPY-INDUCED PERIPHERAL NEUROPATHY (HCC): ICD-10-CM

## 2023-05-22 DIAGNOSIS — K21.9 GASTRO-ESOPHAGEAL REFLUX DISEASE WITHOUT ESOPHAGITIS: ICD-10-CM

## 2023-05-22 DIAGNOSIS — R42 DIZZY: ICD-10-CM

## 2023-05-22 DIAGNOSIS — I10 ESSENTIAL (PRIMARY) HYPERTENSION: Primary | ICD-10-CM

## 2023-05-22 DIAGNOSIS — G62.0 CHEMOTHERAPY-INDUCED PERIPHERAL NEUROPATHY (HCC): ICD-10-CM

## 2023-05-22 DIAGNOSIS — F31.81 BIPOLAR II DISORDER (HCC): ICD-10-CM

## 2023-05-22 PROCEDURE — 3074F SYST BP LT 130 MM HG: CPT | Performed by: INTERNAL MEDICINE

## 2023-05-22 PROCEDURE — G8427 DOCREV CUR MEDS BY ELIG CLIN: HCPCS | Performed by: INTERNAL MEDICINE

## 2023-05-22 PROCEDURE — 3078F DIAST BP <80 MM HG: CPT | Performed by: INTERNAL MEDICINE

## 2023-05-22 PROCEDURE — 1090F PRES/ABSN URINE INCON ASSESS: CPT | Performed by: INTERNAL MEDICINE

## 2023-05-22 PROCEDURE — G8400 PT W/DXA NO RESULTS DOC: HCPCS | Performed by: INTERNAL MEDICINE

## 2023-05-22 PROCEDURE — G8419 CALC BMI OUT NRM PARAM NOF/U: HCPCS | Performed by: INTERNAL MEDICINE

## 2023-05-22 PROCEDURE — 4004F PT TOBACCO SCREEN RCVD TLK: CPT | Performed by: INTERNAL MEDICINE

## 2023-05-22 PROCEDURE — 99214 OFFICE O/P EST MOD 30 MIN: CPT | Performed by: INTERNAL MEDICINE

## 2023-05-22 PROCEDURE — 1123F ACP DISCUSS/DSCN MKR DOCD: CPT | Performed by: INTERNAL MEDICINE

## 2023-05-22 RX ORDER — GABAPENTIN 100 MG/1
CAPSULE ORAL
Qty: 90 CAPSULE | Refills: 5 | Status: SHIPPED | OUTPATIENT
Start: 2023-05-22 | End: 2023-06-22

## 2023-05-22 ASSESSMENT — ENCOUNTER SYMPTOMS
VOMITING: 0
COUGH: 0
DIARRHEA: 0
CHEST TIGHTNESS: 0
WHEEZING: 0
SHORTNESS OF BREATH: 0
NAUSEA: 0
ABDOMINAL PAIN: 0

## 2023-05-22 NOTE — PROGRESS NOTES
dropping from 4-day to 3 a day to see if I can help. Review of Systems   Constitutional:  Negative for chills, diaphoresis, fatigue and fever. Respiratory:  Negative for cough, chest tightness, shortness of breath and wheezing. Cardiovascular:  Negative for chest pain. Gastrointestinal:  Negative for abdominal pain, diarrhea, nausea and vomiting. Neurological:  Positive for dizziness. Negative for tremors, seizures, facial asymmetry, speech difficulty, numbness and headaches. Psychiatric/Behavioral:  Positive for sleep disturbance. The patient is nervous/anxious. Objective   Physical Exam  Constitutional:       Appearance: Normal appearance. HENT:      Head: Normocephalic and atraumatic. Cardiovascular:      Rate and Rhythm: Normal rate and regular rhythm. Pulmonary:      Effort: Pulmonary effort is normal.      Breath sounds: Normal breath sounds. Musculoskeletal:      Right lower leg: No edema. Left lower leg: No edema. Neurological:      General: No focal deficit present. Mental Status: She is alert and oriented to person, place, and time. Mental status is at baseline. Psychiatric:         Behavior: Behavior normal.                An electronic signature was used to authenticate this note.     --Carolyn Combs MD

## 2023-06-25 SDOH — ECONOMIC STABILITY: INCOME INSECURITY: HOW HARD IS IT FOR YOU TO PAY FOR THE VERY BASICS LIKE FOOD, HOUSING, MEDICAL CARE, AND HEATING?: PATIENT DECLINED

## 2023-06-25 SDOH — ECONOMIC STABILITY: FOOD INSECURITY: WITHIN THE PAST 12 MONTHS, YOU WORRIED THAT YOUR FOOD WOULD RUN OUT BEFORE YOU GOT MONEY TO BUY MORE.: PATIENT DECLINED

## 2023-06-25 SDOH — ECONOMIC STABILITY: FOOD INSECURITY: WITHIN THE PAST 12 MONTHS, THE FOOD YOU BOUGHT JUST DIDN'T LAST AND YOU DIDN'T HAVE MONEY TO GET MORE.: PATIENT DECLINED

## 2023-06-25 SDOH — ECONOMIC STABILITY: HOUSING INSECURITY
IN THE LAST 12 MONTHS, WAS THERE A TIME WHEN YOU DID NOT HAVE A STEADY PLACE TO SLEEP OR SLEPT IN A SHELTER (INCLUDING NOW)?: PATIENT REFUSED

## 2023-06-25 SDOH — ECONOMIC STABILITY: TRANSPORTATION INSECURITY
IN THE PAST 12 MONTHS, HAS LACK OF TRANSPORTATION KEPT YOU FROM MEETINGS, WORK, OR FROM GETTING THINGS NEEDED FOR DAILY LIVING?: PATIENT DECLINED

## 2023-06-26 ENCOUNTER — OFFICE VISIT (OUTPATIENT)
Age: 83
End: 2023-06-26
Payer: MEDICARE

## 2023-06-26 VITALS
HEIGHT: 61 IN | OXYGEN SATURATION: 98 % | WEIGHT: 138 LBS | DIASTOLIC BLOOD PRESSURE: 78 MMHG | TEMPERATURE: 97.5 F | BODY MASS INDEX: 26.06 KG/M2 | HEART RATE: 92 BPM | SYSTOLIC BLOOD PRESSURE: 125 MMHG

## 2023-06-26 DIAGNOSIS — G62.0 CHEMOTHERAPY-INDUCED PERIPHERAL NEUROPATHY (HCC): ICD-10-CM

## 2023-06-26 DIAGNOSIS — R42 DIZZY: Primary | ICD-10-CM

## 2023-06-26 DIAGNOSIS — T45.1X5A CHEMOTHERAPY-INDUCED PERIPHERAL NEUROPATHY (HCC): ICD-10-CM

## 2023-06-26 DIAGNOSIS — F31.81 BIPOLAR II DISORDER (HCC): ICD-10-CM

## 2023-06-26 PROCEDURE — 99213 OFFICE O/P EST LOW 20 MIN: CPT | Performed by: INTERNAL MEDICINE

## 2023-06-26 PROCEDURE — 1123F ACP DISCUSS/DSCN MKR DOCD: CPT | Performed by: INTERNAL MEDICINE

## 2023-06-26 PROCEDURE — 1036F TOBACCO NON-USER: CPT | Performed by: INTERNAL MEDICINE

## 2023-06-26 PROCEDURE — G8427 DOCREV CUR MEDS BY ELIG CLIN: HCPCS | Performed by: INTERNAL MEDICINE

## 2023-06-26 PROCEDURE — G8419 CALC BMI OUT NRM PARAM NOF/U: HCPCS | Performed by: INTERNAL MEDICINE

## 2023-06-26 PROCEDURE — 1090F PRES/ABSN URINE INCON ASSESS: CPT | Performed by: INTERNAL MEDICINE

## 2023-06-26 PROCEDURE — G8400 PT W/DXA NO RESULTS DOC: HCPCS | Performed by: INTERNAL MEDICINE

## 2023-06-26 RX ORDER — ZALEPLON 10 MG/1
CAPSULE ORAL
COMMUNITY
Start: 2023-06-22

## 2023-06-26 ASSESSMENT — PATIENT HEALTH QUESTIONNAIRE - PHQ9
2. FEELING DOWN, DEPRESSED OR HOPELESS: 0
SUM OF ALL RESPONSES TO PHQ QUESTIONS 1-9: 0
SUM OF ALL RESPONSES TO PHQ9 QUESTIONS 1 & 2: 0
SUM OF ALL RESPONSES TO PHQ QUESTIONS 1-9: 0
SUM OF ALL RESPONSES TO PHQ QUESTIONS 1-9: 0
1. LITTLE INTEREST OR PLEASURE IN DOING THINGS: 0
SUM OF ALL RESPONSES TO PHQ QUESTIONS 1-9: 0

## 2023-06-29 ENCOUNTER — TELEPHONE (OUTPATIENT)
Age: 83
End: 2023-06-29

## 2023-06-29 DIAGNOSIS — G62.0 CHEMOTHERAPY-INDUCED PERIPHERAL NEUROPATHY (HCC): ICD-10-CM

## 2023-06-29 DIAGNOSIS — T45.1X5A CHEMOTHERAPY-INDUCED PERIPHERAL NEUROPATHY (HCC): ICD-10-CM

## 2023-06-29 DIAGNOSIS — K21.9 GASTRO-ESOPHAGEAL REFLUX DISEASE WITHOUT ESOPHAGITIS: Primary | ICD-10-CM

## 2023-06-30 RX ORDER — OMEPRAZOLE 20 MG/1
20 CAPSULE, DELAYED RELEASE ORAL DAILY
Qty: 90 CAPSULE | Refills: 0 | Status: SHIPPED | OUTPATIENT
Start: 2023-06-30

## 2023-06-30 RX ORDER — GABAPENTIN 100 MG/1
CAPSULE ORAL
Qty: 90 CAPSULE | Refills: 0 | Status: SHIPPED | OUTPATIENT
Start: 2023-06-30 | End: 2023-08-04

## 2023-07-25 DIAGNOSIS — K21.9 GASTRO-ESOPHAGEAL REFLUX DISEASE WITHOUT ESOPHAGITIS: ICD-10-CM

## 2023-07-25 RX ORDER — OMEPRAZOLE 20 MG/1
CAPSULE, DELAYED RELEASE ORAL
Qty: 90 CAPSULE | Refills: 1 | OUTPATIENT
Start: 2023-07-25

## 2023-07-25 NOTE — TELEPHONE ENCOUNTER
Last visit 6/26/2023   Requested Prescriptions     Pending Prescriptions Disp Refills    omeprazole (PRILOSEC) 20 MG delayed release capsule [Pharmacy Med Name: OMEPRAZOLE DR 20 MG CAPSULE] 90 capsule 1     Sig: TAKE 1 CAPSULE BY MOUTH EVERY DAY      Next apt 11/27/2023

## 2023-07-26 RX ORDER — OMEPRAZOLE 20 MG/1
CAPSULE, DELAYED RELEASE ORAL
Qty: 90 CAPSULE | Refills: 1 | Status: SHIPPED | OUTPATIENT
Start: 2023-07-26

## 2023-08-07 ENCOUNTER — TELEPHONE (OUTPATIENT)
Age: 83
End: 2023-08-07

## 2023-08-07 NOTE — TELEPHONE ENCOUNTER
Appt scheduled for Wed Aug 9th at 10:00 with PCP. Patient & family members will bring reports to the appt. Pt had a ct scan, MRI & blood work done. Per family her hgb had dropped & a thyroid nodule was noted. Family states the patient is already est with Dr. Ted Taylor. Advised they go ahead & call to schedule an appt with him or his NP given the drop in hgb & Candida Andres will see her on the 9th to discuss next steps as well.

## 2023-08-07 NOTE — TELEPHONE ENCOUNTER
Patient's , Guerda Odom is calling states Minna Castillo was in [place] this past week. Minna Castillo had a TIA episode and was hospitalized in [place]. Guerda Odom states they told her to follow up with her pcp this week and also with a Neurology doctor as soon as possible.  Please call Guerda Odom back at [phone]

## 2023-08-09 ENCOUNTER — OFFICE VISIT (OUTPATIENT)
Age: 83
End: 2023-08-09
Payer: MEDICARE

## 2023-08-09 VITALS
SYSTOLIC BLOOD PRESSURE: 146 MMHG | WEIGHT: 140 LBS | RESPIRATION RATE: 16 BRPM | HEART RATE: 76 BPM | DIASTOLIC BLOOD PRESSURE: 72 MMHG | OXYGEN SATURATION: 99 % | BODY MASS INDEX: 26.43 KG/M2 | HEIGHT: 61 IN | TEMPERATURE: 97.6 F

## 2023-08-09 DIAGNOSIS — E78.5 DYSLIPIDEMIA, GOAL LDL BELOW 100: ICD-10-CM

## 2023-08-09 DIAGNOSIS — R47.01 APHASIA: ICD-10-CM

## 2023-08-09 DIAGNOSIS — E04.1 RIGHT THYROID NODULE: ICD-10-CM

## 2023-08-09 DIAGNOSIS — F31.81 BIPOLAR II DISORDER (HCC): ICD-10-CM

## 2023-08-09 DIAGNOSIS — D50.9 IRON DEFICIENCY ANEMIA, UNSPECIFIED IRON DEFICIENCY ANEMIA TYPE: ICD-10-CM

## 2023-08-09 DIAGNOSIS — I10 HTN, GOAL BELOW 130/80: ICD-10-CM

## 2023-08-09 DIAGNOSIS — G45.9 TIA (TRANSIENT ISCHEMIC ATTACK): Primary | ICD-10-CM

## 2023-08-09 DIAGNOSIS — Z09 HOSPITAL DISCHARGE FOLLOW-UP: ICD-10-CM

## 2023-08-09 PROCEDURE — 99214 OFFICE O/P EST MOD 30 MIN: CPT | Performed by: INTERNAL MEDICINE

## 2023-08-09 PROCEDURE — G8427 DOCREV CUR MEDS BY ELIG CLIN: HCPCS | Performed by: INTERNAL MEDICINE

## 2023-08-09 PROCEDURE — 3078F DIAST BP <80 MM HG: CPT | Performed by: INTERNAL MEDICINE

## 2023-08-09 PROCEDURE — 3077F SYST BP >= 140 MM HG: CPT | Performed by: INTERNAL MEDICINE

## 2023-08-09 PROCEDURE — G8400 PT W/DXA NO RESULTS DOC: HCPCS | Performed by: INTERNAL MEDICINE

## 2023-08-09 PROCEDURE — G8419 CALC BMI OUT NRM PARAM NOF/U: HCPCS | Performed by: INTERNAL MEDICINE

## 2023-08-09 PROCEDURE — 1123F ACP DISCUSS/DSCN MKR DOCD: CPT | Performed by: INTERNAL MEDICINE

## 2023-08-09 PROCEDURE — 1090F PRES/ABSN URINE INCON ASSESS: CPT | Performed by: INTERNAL MEDICINE

## 2023-08-09 PROCEDURE — 1036F TOBACCO NON-USER: CPT | Performed by: INTERNAL MEDICINE

## 2023-08-09 RX ORDER — DOXYCYCLINE HYCLATE 50 MG/1
324 CAPSULE, GELATIN COATED ORAL
COMMUNITY

## 2023-08-09 RX ORDER — LISINOPRIL 2.5 MG/1
1.25 TABLET ORAL DAILY
Qty: 30 TABLET | Refills: 3 | Status: SHIPPED | OUTPATIENT
Start: 2023-08-09

## 2023-08-09 NOTE — PROGRESS NOTES
Colin Darnell (:  1940) is a 82 y.o. female,Established patient, here for evaluation of the following chief complaint(s):  Follow-Up from Hospital (Patient was seen in [facility] on  for dx of TIA. /)         ASSESSMENT/PLAN:  1. TIA (transient ischemic attack)/aphasia episode while in [facility]  No acute findings on imaging although does have microvascular changes on mri and volume loss  Discussed that I think we need to err on side of  caution and consider this a tia  Would not start asa now given iron deficiency and  needs gi eval with dr wiley  Would advise better control of labile bp-family is concerned re risk of falls-will need close monitoring of bp and check bp at home if feels dizzy  Start very low dose bp  med and appt with home log in 1 mo  2. Dyslipidemia, goal LDL below 100  3. HTN, goal below 130/80  -     lisinopril (ZESTRIL) 2.5 MG tablet; Take 0.5 tablets by mouth daily, Disp-30 tablet, R-3Normal  4. Bipolar II disorder (HCC)-see provider to discuss ? Manic sxs with pressured speech and check dosing of lamictal  5. Right thyroid nodule  -     US HEAD NECK SOFT TISSUE THYROID; Future  6. Aphasia  7. Iron deficiency anemia, unspecified iron deficiency anemia type    Appt in 1 mo  No follow-ups on file. Subjective   SUBJECTIVE/OBJECTIVE:  HPI  Seen today accompanied by her daughter Jake Metzger   Records from a recent hospital stay she was out in [facility] at a  and had an acute episode that lasted roughly 30 minutes where her daughter described her as having word salad language that made no sense and she was confused. Was taken to the hospital and had complete amnesia about the episode. Did not have seizure-like activity severe headache or cardiac symptoms or focal weakness. Was admitted and evaluation included CT of head CTA of neck which showed an incidental 1.7 cm right thyroid lobe nodule but no stenosis or dissection.   MRI of head which

## 2023-08-14 DIAGNOSIS — G62.0 CHEMOTHERAPY-INDUCED PERIPHERAL NEUROPATHY (HCC): ICD-10-CM

## 2023-08-14 DIAGNOSIS — T45.1X5A CHEMOTHERAPY-INDUCED PERIPHERAL NEUROPATHY (HCC): ICD-10-CM

## 2023-08-14 NOTE — TELEPHONE ENCOUNTER
Patient's  called in to request refill of patient's gabapentin QID to be sent to their [facility] in [place] please as they are in route there to spend about a month on vacation. Thank you.

## 2023-08-15 RX ORDER — GABAPENTIN 100 MG/1
CAPSULE ORAL
Qty: 90 CAPSULE | Refills: 0 | Status: SHIPPED | OUTPATIENT
Start: 2023-08-15 | End: 2023-09-23

## 2023-08-16 ENCOUNTER — HOSPITAL ENCOUNTER (OUTPATIENT)
Facility: HOSPITAL | Age: 83
Discharge: HOME | End: 2023-08-19
Attending: INTERNAL MEDICINE
Payer: MEDICARE

## 2023-08-16 DIAGNOSIS — E04.1 RIGHT THYROID NODULE: ICD-10-CM

## 2023-08-16 PROCEDURE — 76536 US EXAM OF HEAD AND NECK: CPT

## 2023-08-17 ENCOUNTER — TELEPHONE (OUTPATIENT)
Age: 83
End: 2023-08-17

## 2023-08-17 NOTE — TELEPHONE ENCOUNTER
----- Message from Raven Melton MD sent at 8/16/2023  3:58 PM EDT -----  Please  notify no worrisome findings does have bilateral nodules and we can repeat study in 1 year to follow this no other eval needed now thanks

## 2023-09-11 ENCOUNTER — OFFICE VISIT (OUTPATIENT)
Age: 83
End: 2023-09-11
Payer: MEDICARE

## 2023-09-11 VITALS
WEIGHT: 137 LBS | SYSTOLIC BLOOD PRESSURE: 110 MMHG | TEMPERATURE: 98.1 F | HEART RATE: 98 BPM | DIASTOLIC BLOOD PRESSURE: 62 MMHG | RESPIRATION RATE: 16 BRPM | BODY MASS INDEX: 25.86 KG/M2 | HEIGHT: 61 IN | OXYGEN SATURATION: 96 %

## 2023-09-11 DIAGNOSIS — G62.0 CHEMOTHERAPY-INDUCED PERIPHERAL NEUROPATHY (HCC): ICD-10-CM

## 2023-09-11 DIAGNOSIS — T45.1X5A CHEMOTHERAPY-INDUCED PERIPHERAL NEUROPATHY (HCC): ICD-10-CM

## 2023-09-11 DIAGNOSIS — I10 HTN, GOAL BELOW 130/80: Primary | ICD-10-CM

## 2023-09-11 DIAGNOSIS — G31.84 MCI (MILD COGNITIVE IMPAIRMENT): ICD-10-CM

## 2023-09-11 DIAGNOSIS — G45.9 TIA (TRANSIENT ISCHEMIC ATTACK): ICD-10-CM

## 2023-09-11 PROCEDURE — 1036F TOBACCO NON-USER: CPT | Performed by: INTERNAL MEDICINE

## 2023-09-11 PROCEDURE — 99214 OFFICE O/P EST MOD 30 MIN: CPT | Performed by: INTERNAL MEDICINE

## 2023-09-11 PROCEDURE — G8427 DOCREV CUR MEDS BY ELIG CLIN: HCPCS | Performed by: INTERNAL MEDICINE

## 2023-09-11 PROCEDURE — G8419 CALC BMI OUT NRM PARAM NOF/U: HCPCS | Performed by: INTERNAL MEDICINE

## 2023-09-11 PROCEDURE — G8400 PT W/DXA NO RESULTS DOC: HCPCS | Performed by: INTERNAL MEDICINE

## 2023-09-11 PROCEDURE — 1090F PRES/ABSN URINE INCON ASSESS: CPT | Performed by: INTERNAL MEDICINE

## 2023-09-11 PROCEDURE — 3078F DIAST BP <80 MM HG: CPT | Performed by: INTERNAL MEDICINE

## 2023-09-11 PROCEDURE — 3074F SYST BP LT 130 MM HG: CPT | Performed by: INTERNAL MEDICINE

## 2023-09-11 PROCEDURE — 1123F ACP DISCUSS/DSCN MKR DOCD: CPT | Performed by: INTERNAL MEDICINE

## 2023-09-11 NOTE — PROGRESS NOTES
edema. Left lower leg: No edema. Neurological:      General: No focal deficit present. Mental Status: She is alert and oriented to person, place, and time. Psychiatric:         Behavior: Behavior normal.      Comments: Some flight of ideas in conversation                  An electronic signature was used to authenticate this note.     --Tyler Pearce MD

## 2023-11-07 ENCOUNTER — TELEPHONE (OUTPATIENT)
Age: 83
End: 2023-11-07

## 2023-11-07 NOTE — TELEPHONE ENCOUNTER
----- Message from Jonah Mir sent at 11/6/2023 12:42 PM EST -----  Subject: Message to Provider    QUESTIONS  Information for Provider? Patient would like to speak to nurse regarding   some upcoming testing.   ---------------------------------------------------------------------------  --------------  [address]  [phone]; OK to leave message on voicemail  ---------------------------------------------------------------------------  --------------  SCRIPT ANSWERS  Relationship to Patient?  Self

## 2023-11-07 NOTE — TELEPHONE ENCOUNTER
V/m left for patient. Advised she has an upcoming appt on Mon 11/13 at 2:15 pm & at that visit she should discuss her upcoming test questions then. Office number left if patient has questions before then.

## 2023-11-13 ENCOUNTER — OFFICE VISIT (OUTPATIENT)
Age: 83
End: 2023-11-13
Payer: MEDICARE

## 2023-11-13 VITALS
OXYGEN SATURATION: 99 % | HEIGHT: 61 IN | SYSTOLIC BLOOD PRESSURE: 135 MMHG | HEART RATE: 81 BPM | WEIGHT: 137 LBS | TEMPERATURE: 97.4 F | RESPIRATION RATE: 16 BRPM | BODY MASS INDEX: 25.86 KG/M2 | DIASTOLIC BLOOD PRESSURE: 79 MMHG

## 2023-11-13 DIAGNOSIS — I10 HTN, GOAL BELOW 130/80: Primary | ICD-10-CM

## 2023-11-13 DIAGNOSIS — D50.9 MICROCYTIC ANEMIA: ICD-10-CM

## 2023-11-13 DIAGNOSIS — F31.81 BIPOLAR II DISORDER, MODERATE, DEPRESSED, WITH ANXIOUS DISTRESS (HCC): ICD-10-CM

## 2023-11-13 PROCEDURE — G8484 FLU IMMUNIZE NO ADMIN: HCPCS | Performed by: INTERNAL MEDICINE

## 2023-11-13 PROCEDURE — 99214 OFFICE O/P EST MOD 30 MIN: CPT | Performed by: INTERNAL MEDICINE

## 2023-11-13 PROCEDURE — G8400 PT W/DXA NO RESULTS DOC: HCPCS | Performed by: INTERNAL MEDICINE

## 2023-11-13 PROCEDURE — 1036F TOBACCO NON-USER: CPT | Performed by: INTERNAL MEDICINE

## 2023-11-13 PROCEDURE — 3078F DIAST BP <80 MM HG: CPT | Performed by: INTERNAL MEDICINE

## 2023-11-13 PROCEDURE — 3075F SYST BP GE 130 - 139MM HG: CPT | Performed by: INTERNAL MEDICINE

## 2023-11-13 PROCEDURE — 1090F PRES/ABSN URINE INCON ASSESS: CPT | Performed by: INTERNAL MEDICINE

## 2023-11-13 PROCEDURE — G8419 CALC BMI OUT NRM PARAM NOF/U: HCPCS | Performed by: INTERNAL MEDICINE

## 2023-11-13 PROCEDURE — G8427 DOCREV CUR MEDS BY ELIG CLIN: HCPCS | Performed by: INTERNAL MEDICINE

## 2023-11-13 PROCEDURE — 1123F ACP DISCUSS/DSCN MKR DOCD: CPT | Performed by: INTERNAL MEDICINE

## 2023-11-13 ASSESSMENT — ENCOUNTER SYMPTOMS
BLOOD IN STOOL: 0
NAUSEA: 0
ABDOMINAL PAIN: 0
CHEST TIGHTNESS: 0
VOMITING: 0
WHEEZING: 0
SHORTNESS OF BREATH: 0
DIARRHEA: 0
COUGH: 0

## 2023-11-13 NOTE — PROGRESS NOTES
Jenny Hare (:  1940) is a 82 y.o. female,Established patient, here for evaluation of the following chief complaint(s):  Follow-up and Hypertension         ASSESSMENT/PLAN:  1. HTN, goal below 130/80-well controlled on low dose lisinopril  -     Comprehensive Metabolic Panel; Future  2. Bipolar II disorder, moderate, depressed, with anxious distress (HCC)-stable on current meds with psychiatry and stable for mac sedation for egd colonoscopy  3. Microcytic anemia-now on iron qd since august  For gi eval  to look for occult sources of gi blood loss  -     Ferritin; Future  -     CBC with Auto Differential; Future      No follow-ups on file. Subjective   SUBJECTIVE/OBJECTIVE:  Hypertension  Pertinent negatives include no chest pain or shortness of breath. Follow-up she is overall feeling better. Hypertension currently on lisinopril 2.5 mg half tab daily no cardiac symptoms no dizzy spells blood pressure looks good. Microcytic anemia hemoglobin was 9 in August with very low ferritin level she was given iron infusion and is now on ferrous gluconate daily and will have endoscopy and colonoscopy with Dr. Rachelle Sheikh scheduled for . His office is asking that I clear her for sedation and I do think that she is stable for sedation with MAC forms filled out. She denies any obvious GI bleeding and feels that her energy is improved. Is taking a daily Prilosec. Mood much improved much less anxious working with psychiatry now on lamotrigine duloxetine and Sonata at night sleep is still not ideal but overall much less anxious  Review of Systems   Constitutional:  Negative for chills, diaphoresis, fatigue and fever. Respiratory:  Negative for cough, chest tightness, shortness of breath and wheezing. Cardiovascular:  Negative for chest pain. Gastrointestinal:  Negative for abdominal pain, blood in stool, diarrhea, nausea and vomiting.           Objective   Physical

## 2023-11-14 LAB
ALBUMIN SERPL-MCNC: 4 G/DL (ref 3.5–5)
ALBUMIN/GLOB SERPL: 1.1 (ref 1.1–2.2)
ALP SERPL-CCNC: 98 U/L (ref 45–117)
ALT SERPL-CCNC: 33 U/L (ref 12–78)
ANION GAP SERPL CALC-SCNC: 7 MMOL/L (ref 5–15)
AST SERPL-CCNC: 19 U/L (ref 15–37)
BASOPHILS # BLD: 0.1 K/UL (ref 0–0.1)
BASOPHILS NFR BLD: 1 % (ref 0–1)
BILIRUB SERPL-MCNC: 0.2 MG/DL (ref 0.2–1)
BUN SERPL-MCNC: 25 MG/DL (ref 6–20)
BUN/CREAT SERPL: 23 (ref 12–20)
CALCIUM SERPL-MCNC: 9.1 MG/DL (ref 8.5–10.1)
CHLORIDE SERPL-SCNC: 106 MMOL/L (ref 97–108)
CO2 SERPL-SCNC: 24 MMOL/L (ref 21–32)
CREAT SERPL-MCNC: 1.11 MG/DL (ref 0.55–1.02)
DIFFERENTIAL METHOD BLD: ABNORMAL
EOSINOPHIL # BLD: 0.2 K/UL (ref 0–0.4)
EOSINOPHIL NFR BLD: 3 % (ref 0–7)
ERYTHROCYTE [DISTWIDTH] IN BLOOD BY AUTOMATED COUNT: 16.7 % (ref 11.5–14.5)
FERRITIN SERPL-MCNC: 19 NG/ML (ref 26–388)
GLOBULIN SER CALC-MCNC: 3.7 G/DL (ref 2–4)
GLUCOSE SERPL-MCNC: 95 MG/DL (ref 65–100)
HCT VFR BLD AUTO: 40.8 % (ref 35–47)
HGB BLD-MCNC: 12.6 G/DL (ref 11.5–16)
IMM GRANULOCYTES # BLD AUTO: 0 K/UL (ref 0–0.04)
IMM GRANULOCYTES NFR BLD AUTO: 0 % (ref 0–0.5)
LYMPHOCYTES # BLD: 1.6 K/UL (ref 0.8–3.5)
LYMPHOCYTES NFR BLD: 24 % (ref 12–49)
MCH RBC QN AUTO: 27.2 PG (ref 26–34)
MCHC RBC AUTO-ENTMCNC: 30.9 G/DL (ref 30–36.5)
MCV RBC AUTO: 87.9 FL (ref 80–99)
MONOCYTES # BLD: 0.6 K/UL (ref 0–1)
MONOCYTES NFR BLD: 9 % (ref 5–13)
NEUTS SEG # BLD: 4 K/UL (ref 1.8–8)
NEUTS SEG NFR BLD: 63 % (ref 32–75)
NRBC # BLD: 0 K/UL (ref 0–0.01)
NRBC BLD-RTO: 0 PER 100 WBC
PLATELET # BLD AUTO: 374 K/UL (ref 150–400)
PMV BLD AUTO: 9.3 FL (ref 8.9–12.9)
POTASSIUM SERPL-SCNC: 4.7 MMOL/L (ref 3.5–5.1)
PROT SERPL-MCNC: 7.7 G/DL (ref 6.4–8.2)
RBC # BLD AUTO: 4.64 M/UL (ref 3.8–5.2)
SODIUM SERPL-SCNC: 137 MMOL/L (ref 136–145)
WBC # BLD AUTO: 6.4 K/UL (ref 3.6–11)

## 2023-11-20 ENCOUNTER — TELEPHONE (OUTPATIENT)
Age: 83
End: 2023-11-20

## 2023-11-20 ENCOUNTER — OFFICE VISIT (OUTPATIENT)
Age: 83
End: 2023-11-20
Payer: MEDICARE

## 2023-11-20 DIAGNOSIS — F31.81 BIPOLAR II DISORDER (HCC): ICD-10-CM

## 2023-11-20 DIAGNOSIS — D50.9 MICROCYTIC ANEMIA: Primary | ICD-10-CM

## 2023-11-20 DIAGNOSIS — G31.84 MILD COGNITIVE IMPAIRMENT OF UNCERTAIN OR UNKNOWN ETIOLOGY: Primary | ICD-10-CM

## 2023-11-20 DIAGNOSIS — R45.86 EMOTIONAL LABILITY: ICD-10-CM

## 2023-11-20 DIAGNOSIS — Z63.0 PROBLEMS IN RELATIONSHIP WITH SPOUSE OR PARTNER: ICD-10-CM

## 2023-11-20 DIAGNOSIS — F90.0 ATTENTION-DEFICIT HYPERACTIVITY DISORDER, PREDOMINANTLY INATTENTIVE TYPE: ICD-10-CM

## 2023-11-20 PROCEDURE — 90791 PSYCH DIAGNOSTIC EVALUATION: CPT | Performed by: CLINICAL NEUROPSYCHOLOGIST

## 2023-11-20 PROCEDURE — 1036F TOBACCO NON-USER: CPT | Performed by: CLINICAL NEUROPSYCHOLOGIST

## 2023-11-20 PROCEDURE — 1123F ACP DISCUSS/DSCN MKR DOCD: CPT | Performed by: CLINICAL NEUROPSYCHOLOGIST

## 2023-11-20 SDOH — SOCIAL STABILITY - SOCIAL INSECURITY: PROBLEMS IN RELATIONSHIP WITH SPOUSE OR PARTNER: Z63.0

## 2023-11-20 NOTE — TELEPHONE ENCOUNTER
Pt stopped by the office and is requesting a refill on ferrous gluconate, 324 mg tab. Pt has this filled in [facility] 8/8/23. By Dr. Mitali Bell. The pharmacy asked that she goes to her pcp to get this filled. Pt is out of meds. Please call pt if you have any questions.      Cell  [address] [facility] [phone]    Thank you Subjective   32-year-old female presents to the emergency department with complaints of vaginal bleeding, dysuria, and increased frequency x3 days.  She reports recently having her IUD removed, her OB/GYN at that time informed her that vaginal bleeding was normal, but she remains concerned due to prolonged period of time.  She states previously her periods were approximately 3 days in length, currently 7 days.  Patient denies any other bowel or bladder complaints.  She has no other concerns at this time.  She denies any abdominal pain, chest pain, trouble or difficulty breathing, fevers or chills, recent sick contacts or suspicious food exposures.  Vitals within normal range except for tachycardia initially, which is now resided.  O2 91% in the computer, 100% on room air upon my initial assessment.  Patient resting comfortably.      History provided by:  Patient   used: No        Review of Systems   Constitutional: Negative for chills and fever.   HENT: Negative for congestion, ear pain and sore throat.    Eyes: Negative for pain.   Respiratory: Negative for cough, chest tightness and shortness of breath.    Cardiovascular: Negative for chest pain.   Gastrointestinal: Negative for abdominal pain, diarrhea, nausea and vomiting.   Genitourinary: Positive for dysuria, frequency, urgency and vaginal bleeding. Negative for flank pain, hematuria, pelvic pain, vaginal discharge and vaginal pain.   Musculoskeletal: Negative for joint swelling.   Skin: Negative for pallor.   Neurological: Negative for seizures and headaches.   All other systems reviewed and are negative.      Past Medical History:   Diagnosis Date   • Anemia    • Asthma    • Gallbladder disorder    • Polycystic ovarian syndrome    • Right ankle injury        No Known Allergies    Past Surgical History:   Procedure Laterality Date   • HX OVARIAN CYSTECTOMY  2014    POLYPS       Family History   Problem Relation Age of Onset   • Colon  cancer Mother 43   • Lung cancer Mother        Social History     Socioeconomic History   • Marital status: Single   Tobacco Use   • Smoking status: Former Smoker   • Smokeless tobacco: Never Used   Vaping Use   • Vaping Use: Never used   Substance and Sexual Activity   • Alcohol use: Never   • Drug use: Never   • Sexual activity: Yes     Partners: Male     Birth control/protection: I.U.D.     Comment: Mirena, wants removed           Objective   Physical Exam  Vitals and nursing note reviewed. Exam conducted with a chaperone present.   Constitutional:       General: She is not in acute distress.     Appearance: Normal appearance. She is not toxic-appearing.   HENT:      Head: Normocephalic and atraumatic.      Mouth/Throat:      Mouth: Mucous membranes are moist.   Eyes:      General: No scleral icterus.  Cardiovascular:      Rate and Rhythm: Normal rate and regular rhythm.      Pulses: Normal pulses.      Heart sounds: Normal heart sounds.   Pulmonary:      Effort: Pulmonary effort is normal. No respiratory distress.      Breath sounds: Normal breath sounds.   Abdominal:      General: Abdomen is flat.      Palpations: Abdomen is soft.      Tenderness: There is no abdominal tenderness.   Genitourinary:     Exam position: Lithotomy position.      Labia:         Right: No rash, tenderness, lesion or injury.         Left: No rash, tenderness, lesion or injury.       Urethra: No prolapse, urethral pain, urethral swelling or urethral lesion.      Vagina: Normal.      Cervix: Lesion present. No discharge, erythema or cervical bleeding.      Uterus: Normal.       Adnexa: Right adnexa normal and left adnexa normal.      Rectum: Normal.         Musculoskeletal:         General: Normal range of motion.      Cervical back: Normal range of motion and neck supple.   Skin:     General: Skin is warm and dry.   Neurological:      Mental Status: She is alert and oriented to person, place, and time. Mental status is at baseline.          Procedures           ED Course                                           MDM  Number of Diagnoses or Management Options  Diagnosis management comments: Mrs. Christian was evaluated by myself at this time for etiology of reported vaginal bleeding, dysuria, frequency.  Urinalysis at this time revealed patient has a urinary tract infection.  Pelvic exam at this time revealed no remarkable findings with the exception of lesion that I reported to patient.  I informed the patient that this lesion may or may not be benign, she should follow-up with OB/GYN as scheduled next month for Pap smear to further evaluate.  All other lab work and additional testing for STIs were negative and unremarkable.  Patient verbalized understanding and agreement with following treatment plan.  She reported no other concerns or complaints.  Resting comfortably in the room.  Vitals within normal range.  Patient should return to the emergency department for any new or worsening symptoms or concerns.  Patient is appropriate for discharge at this time with outpatient follow-up with OB/GYN.       Amount and/or Complexity of Data Reviewed  Clinical lab tests: reviewed and ordered    Risk of Complications, Morbidity, and/or Mortality  Presenting problems: low  Diagnostic procedures: low  Management options: low    Patient Progress  Patient progress: stable      Final diagnoses:   Urinary tract infection without hematuria, site unspecified       ED Disposition  ED Disposition     ED Disposition Condition Comment    Discharge Stable           Chely Kang MD  [address] [address]  [phone]    Schedule an appointment as soon as possible for a visit   As needed, If symptoms worsen         Medication List      New Prescriptions    nitrofurantoin (macrocrystal-monohydrate) 100 MG capsule  Commonly known as: MACROBID  Take 1 capsule by mouth 2 (Two) Times a Day for 7 days.     phenazopyridine 200 MG tablet  Commonly  known as: PYRIDIUM  Take 1 tablet by mouth 3 (Three) Times a Day As Needed for Bladder Spasms for up to 2 days.           Where to Get Your Medications      These medications were sent to [id number] - [place], [place] [address] AT [facility] OF [address]/[facility] & [place] - [phone]  - [phone]   [address] [place] [address]    Phone: [phone]   · nitrofurantoin (macrocrystal-monohydrate) 100 MG capsule  · phenazopyridine 200 MG tablet          Joy Cabrera, PA-C  10/21/21 1239

## 2023-11-20 NOTE — PROGRESS NOTES
1    zaleplon (SONATA) 10 MG capsule TAKE 1 CAPSULE BY MOUTH AT BEDTIME AS NEEDED      DULoxetine (CYMBALTA) 60 MG extended release capsule Take 1 capsule by mouth daily      lamoTRIgine (LAMICTAL) 25 MG tablet Take 1 tablet by mouth daily      linaclotide (LINZESS) 290 MCG CAPS capsule Take 1 capsule by mouth daily       No current facility-administered medications for this visit. Plan:  She seems to be doing well, better on medication. Will schedule for one year to re test and call me in the interim.

## 2023-11-21 DIAGNOSIS — T45.1X5A CHEMOTHERAPY-INDUCED PERIPHERAL NEUROPATHY (HCC): ICD-10-CM

## 2023-11-21 DIAGNOSIS — G62.0 CHEMOTHERAPY-INDUCED PERIPHERAL NEUROPATHY (HCC): ICD-10-CM

## 2023-11-21 RX ORDER — FERROUS GLUCONATE 324(38)MG
324 TABLET ORAL
Qty: 90 TABLET | Refills: 0 | Status: SHIPPED | OUTPATIENT
Start: 2023-11-21

## 2023-11-21 RX ORDER — GABAPENTIN 100 MG/1
CAPSULE ORAL
Qty: 90 CAPSULE | Refills: 5 | Status: SHIPPED | OUTPATIENT
Start: 2023-11-21 | End: 2024-04-19

## 2023-11-21 NOTE — TELEPHONE ENCOUNTER
Her recent ferritin was low with a normal hgb-ok to fill the iron for 3 months but will then need visit to decide on ongoing need for this  Let her know needs visit in 3 mo to reassess need for iron thanks

## 2023-12-21 ENCOUNTER — TELEPHONE (OUTPATIENT)
Age: 83
End: 2023-12-21

## 2023-12-21 NOTE — TELEPHONE ENCOUNTER
The patient will like a call back to discus her fainting two days ago. She will like to be advised. PSR offered her an appointment patient decline. She has an upcoming appointment in February 2024. [phone].

## 2024-01-14 DIAGNOSIS — K21.9 GASTRO-ESOPHAGEAL REFLUX DISEASE WITHOUT ESOPHAGITIS: ICD-10-CM

## 2024-01-15 RX ORDER — OMEPRAZOLE 20 MG/1
CAPSULE, DELAYED RELEASE ORAL
Qty: 90 CAPSULE | Refills: 1 | Status: SHIPPED | OUTPATIENT
Start: 2024-01-15

## 2024-02-07 ENCOUNTER — OFFICE VISIT (OUTPATIENT)
Age: 84
End: 2024-02-07
Payer: MEDICARE

## 2024-02-07 VITALS
TEMPERATURE: 98.1 F | HEART RATE: 89 BPM | WEIGHT: 137.8 LBS | BODY MASS INDEX: 26.01 KG/M2 | DIASTOLIC BLOOD PRESSURE: 67 MMHG | HEIGHT: 61 IN | SYSTOLIC BLOOD PRESSURE: 101 MMHG | RESPIRATION RATE: 16 BRPM | OXYGEN SATURATION: 97 %

## 2024-02-07 DIAGNOSIS — G62.0 CHEMOTHERAPY-INDUCED PERIPHERAL NEUROPATHY (HCC): ICD-10-CM

## 2024-02-07 DIAGNOSIS — Z00.00 MEDICARE ANNUAL WELLNESS VISIT, SUBSEQUENT: ICD-10-CM

## 2024-02-07 DIAGNOSIS — G31.84 MCI (MILD COGNITIVE IMPAIRMENT): ICD-10-CM

## 2024-02-07 DIAGNOSIS — F31.81 BIPOLAR II DISORDER (HCC): ICD-10-CM

## 2024-02-07 DIAGNOSIS — E61.1 IRON DEFICIENCY: ICD-10-CM

## 2024-02-07 DIAGNOSIS — C50.112 MALIGNANT NEOPLASM OF CENTRAL PORTION OF LEFT FEMALE BREAST, UNSPECIFIED ESTROGEN RECEPTOR STATUS (HCC): ICD-10-CM

## 2024-02-07 DIAGNOSIS — E61.1 IRON DEFICIENCY: Primary | ICD-10-CM

## 2024-02-07 DIAGNOSIS — T45.1X5A CHEMOTHERAPY-INDUCED PERIPHERAL NEUROPATHY (HCC): ICD-10-CM

## 2024-02-07 LAB — FERRITIN SERPL-MCNC: 32 NG/ML (ref 26–388)

## 2024-02-07 PROCEDURE — 99213 OFFICE O/P EST LOW 20 MIN: CPT | Performed by: INTERNAL MEDICINE

## 2024-02-07 PROCEDURE — 1123F ACP DISCUSS/DSCN MKR DOCD: CPT | Performed by: INTERNAL MEDICINE

## 2024-02-07 PROCEDURE — G0439 PPPS, SUBSEQ VISIT: HCPCS | Performed by: INTERNAL MEDICINE

## 2024-02-07 PROCEDURE — 1036F TOBACCO NON-USER: CPT | Performed by: INTERNAL MEDICINE

## 2024-02-07 PROCEDURE — G8427 DOCREV CUR MEDS BY ELIG CLIN: HCPCS | Performed by: INTERNAL MEDICINE

## 2024-02-07 PROCEDURE — G8484 FLU IMMUNIZE NO ADMIN: HCPCS | Performed by: INTERNAL MEDICINE

## 2024-02-07 PROCEDURE — G8400 PT W/DXA NO RESULTS DOC: HCPCS | Performed by: INTERNAL MEDICINE

## 2024-02-07 PROCEDURE — 1090F PRES/ABSN URINE INCON ASSESS: CPT | Performed by: INTERNAL MEDICINE

## 2024-02-07 PROCEDURE — G8419 CALC BMI OUT NRM PARAM NOF/U: HCPCS | Performed by: INTERNAL MEDICINE

## 2024-02-07 ASSESSMENT — LIFESTYLE VARIABLES: HOW OFTEN DO YOU HAVE A DRINK CONTAINING ALCOHOL: NEVER

## 2024-02-07 NOTE — PROGRESS NOTES
Marjorie Ohara (:  1940) is a 83 y.o. female,Established patient, here for evaluation of the following chief complaint(s):  3 Month Follow-Up, Discuss Medications (Continue daily iron, pt doesn't have medication list would like PCP to go over with her. ), and Medicare AWV         ASSESSMENT/PLAN:  1. Iron deficiency-tolerates daily iron if ferritin is up could dec to qod  -     CBC with Auto Differential; Future  -     Ferritin; Future  2. Chemotherapy-induced peripheral neuropathy (HCC)  3. Bipolar II disorder (HCC)-cont on meds especially given her current  stressors with 's lung cancer dx  4. Malignant neoplasm of central portion of left female breast, unspecified estrogen receptor status (HCC)-no recurrence   5. Medicare annual wellness visit, subsequent  6. MCI (mild cognitive impairment)-neuropsych testing in nov did not show a dementia process      No follow-ups on file.         Subjective   SUBJECTIVE/OBJECTIVE:  HPI  Seen today for follow-up on her iron deficiency.  She has been working with GI and reports that endoscopy and colonoscopy were normal but does have a small bowel study scheduled.  Her last hemoglobin was normal but ferritin level was low.  She has been on an iron supplement since that time.  Notes chronic constipation but feels that the Linzess manages it and she is generally having a movement every day.  Denies seeing bloody stools.  Will check ferritin levels today.    Hypertension currently on lisinopril 2.5 mg half tab daily and blood pressure looks good.    Mild cognitive impairment had neuropsych evaluation this fall he fortunately did not find a dementia process but stable MCI exacerbated by stress.  Her  is being treated for lung cancer and she understandably worries about loss of him they have been  for 63 years.    Bipolar disorder does see psychiatry regularly notes that she is finally sleeping better with Sonata duloxetine and Lamictal.    Notes she

## 2024-02-08 LAB
BASOPHILS # BLD: 0 K/UL (ref 0–0.1)
BASOPHILS NFR BLD: 1 % (ref 0–1)
DIFFERENTIAL METHOD BLD: NORMAL
EOSINOPHIL # BLD: 0.2 K/UL (ref 0–0.4)
EOSINOPHIL NFR BLD: 3 % (ref 0–7)
ERYTHROCYTE [DISTWIDTH] IN BLOOD BY AUTOMATED COUNT: 13.6 % (ref 11.5–14.5)
HCT VFR BLD AUTO: 36.5 % (ref 35–47)
HGB BLD-MCNC: 12.5 G/DL (ref 11.5–16)
IMM GRANULOCYTES # BLD AUTO: 0 K/UL (ref 0–0.04)
IMM GRANULOCYTES NFR BLD AUTO: 0 % (ref 0–0.5)
LYMPHOCYTES # BLD: 1.7 K/UL (ref 0.8–3.5)
LYMPHOCYTES NFR BLD: 26 % (ref 12–49)
MCH RBC QN AUTO: 30.6 PG (ref 26–34)
MCHC RBC AUTO-ENTMCNC: 34.2 G/DL (ref 30–36.5)
MCV RBC AUTO: 89.5 FL (ref 80–99)
MONOCYTES # BLD: 0.6 K/UL (ref 0–1)
MONOCYTES NFR BLD: 9 % (ref 5–13)
NEUTS SEG # BLD: 4 K/UL (ref 1.8–8)
NEUTS SEG NFR BLD: 61 % (ref 32–75)
NRBC # BLD: 0 K/UL (ref 0–0.01)
NRBC BLD-RTO: 0 PER 100 WBC
PLATELET # BLD AUTO: 318 K/UL (ref 150–400)
PMV BLD AUTO: 9.8 FL (ref 8.9–12.9)
RBC # BLD AUTO: 4.08 M/UL (ref 3.8–5.2)
WBC # BLD AUTO: 6.6 K/UL (ref 3.6–11)

## 2024-04-04 DIAGNOSIS — I10 HTN, GOAL BELOW 130/80: ICD-10-CM

## 2024-04-04 RX ORDER — LISINOPRIL 2.5 MG/1
1.25 TABLET ORAL DAILY
Qty: 45 TABLET | Refills: 1 | Status: SHIPPED | OUTPATIENT
Start: 2024-04-04

## 2024-04-17 ENCOUNTER — TELEPHONE (OUTPATIENT)
Age: 84
End: 2024-04-17

## 2024-04-17 DIAGNOSIS — D50.9 MICROCYTIC ANEMIA: ICD-10-CM

## 2024-04-17 RX ORDER — FERROUS GLUCONATE 324(38)MG
324 TABLET ORAL EVERY OTHER DAY
Qty: 45 TABLET | Refills: 1 | Status: SHIPPED | OUTPATIENT
Start: 2024-04-17

## 2024-04-17 NOTE — TELEPHONE ENCOUNTER
Medication refill for     ferrous gluconate (FERGON) 324 (38 Fe) MG tablet    [facility]/pharmacy #[id number] - [place], [place] - [address] [phone] - f [phone]

## 2024-05-18 DIAGNOSIS — G62.0 CHEMOTHERAPY-INDUCED PERIPHERAL NEUROPATHY (HCC): ICD-10-CM

## 2024-05-18 DIAGNOSIS — T45.1X5A CHEMOTHERAPY-INDUCED PERIPHERAL NEUROPATHY (HCC): ICD-10-CM

## 2024-05-20 RX ORDER — GABAPENTIN 100 MG/1
CAPSULE ORAL
Qty: 90 CAPSULE | Refills: 0 | Status: SHIPPED | OUTPATIENT
Start: 2024-05-20 | End: 2024-06-19

## 2024-06-15 DIAGNOSIS — T45.1X5A CHEMOTHERAPY-INDUCED PERIPHERAL NEUROPATHY (HCC): ICD-10-CM

## 2024-06-15 DIAGNOSIS — G62.0 CHEMOTHERAPY-INDUCED PERIPHERAL NEUROPATHY (HCC): ICD-10-CM

## 2024-06-17 RX ORDER — GABAPENTIN 100 MG/1
CAPSULE ORAL
Qty: 90 CAPSULE | Refills: 0 | Status: SHIPPED | OUTPATIENT
Start: 2024-06-17 | End: 2024-07-17

## 2024-07-17 ENCOUNTER — TELEPHONE (OUTPATIENT)
Age: 84
End: 2024-07-17

## 2024-07-17 DIAGNOSIS — K21.9 GASTRO-ESOPHAGEAL REFLUX DISEASE WITHOUT ESOPHAGITIS: ICD-10-CM

## 2024-07-17 DIAGNOSIS — T45.1X5A CHEMOTHERAPY-INDUCED PERIPHERAL NEUROPATHY (HCC): ICD-10-CM

## 2024-07-17 DIAGNOSIS — G62.0 CHEMOTHERAPY-INDUCED PERIPHERAL NEUROPATHY (HCC): ICD-10-CM

## 2024-07-17 RX ORDER — OMEPRAZOLE 20 MG/1
CAPSULE, DELAYED RELEASE ORAL
Qty: 30 CAPSULE | Refills: 0 | Status: SHIPPED | OUTPATIENT
Start: 2024-07-17

## 2024-07-17 RX ORDER — GABAPENTIN 100 MG/1
CAPSULE ORAL
Qty: 90 CAPSULE | Refills: 0 | Status: SHIPPED | OUTPATIENT
Start: 2024-07-17 | End: 2024-08-16

## 2024-07-17 NOTE — TELEPHONE ENCOUNTER
The patient is requesting a Rx refill on the medication listed below:    omeprazole (PRILOSEC) 20 MG delayed release capsule   gabapentin (NEURONTIN) 100 MG capsule   [facility]/pharmacy #[id number] - [place], [place] - [address] [facility] [address] [phone]  f [phone].

## 2024-07-17 NOTE — TELEPHONE ENCOUNTER
I did send in 30 days of meds but need to see her for any further refills as she missed her last appt  Please let her know needs appt in a month for  ongoing fills thanks

## 2024-07-30 ENCOUNTER — TELEPHONE (OUTPATIENT)
Age: 84
End: 2024-07-30

## 2024-07-30 NOTE — TELEPHONE ENCOUNTER
The patient is requesting a call back to discuss Refills.  Patient also has a schedule appointment on 09/17/2024   [phone]  gabapentin (NEURONTIN) 100 MG capsule

## 2024-07-30 NOTE — TELEPHONE ENCOUNTER
Patient states after tomorrow she will go on vacation to [place] until September. She is not completely out of gabapentin but she will run out soon. Patient is available for an appt tomorrow morning so rescheduled her appt to tomorrow at 8:15 with PCP to address ongoing refills for her gabapentin. Patient was thankful for the appt.

## 2024-07-31 ENCOUNTER — OFFICE VISIT (OUTPATIENT)
Age: 84
End: 2024-07-31
Payer: MEDICARE

## 2024-07-31 VITALS
WEIGHT: 142 LBS | HEART RATE: 88 BPM | BODY MASS INDEX: 26.81 KG/M2 | DIASTOLIC BLOOD PRESSURE: 70 MMHG | OXYGEN SATURATION: 98 % | RESPIRATION RATE: 16 BRPM | SYSTOLIC BLOOD PRESSURE: 110 MMHG | HEIGHT: 61 IN

## 2024-07-31 DIAGNOSIS — Z23 NEED FOR PROPHYLACTIC VACCINATION AGAINST STREPTOCOCCUS PNEUMONIAE (PNEUMOCOCCUS): ICD-10-CM

## 2024-07-31 DIAGNOSIS — I10 HTN, GOAL BELOW 130/80: ICD-10-CM

## 2024-07-31 DIAGNOSIS — D50.9 MICROCYTIC ANEMIA: ICD-10-CM

## 2024-07-31 DIAGNOSIS — F31.81 BIPOLAR II DISORDER (HCC): ICD-10-CM

## 2024-07-31 DIAGNOSIS — G62.0 CHEMOTHERAPY-INDUCED PERIPHERAL NEUROPATHY (HCC): Primary | ICD-10-CM

## 2024-07-31 DIAGNOSIS — T45.1X5A CHEMOTHERAPY-INDUCED PERIPHERAL NEUROPATHY (HCC): Primary | ICD-10-CM

## 2024-07-31 LAB
ALBUMIN SERPL-MCNC: 3.7 G/DL (ref 3.5–5)
ALBUMIN/GLOB SERPL: 1.1 (ref 1.1–2.2)
ALP SERPL-CCNC: 84 U/L (ref 45–117)
ALT SERPL-CCNC: 40 U/L (ref 12–78)
ANION GAP SERPL CALC-SCNC: 8 MMOL/L (ref 5–15)
AST SERPL-CCNC: 25 U/L (ref 15–37)
BILIRUB SERPL-MCNC: 0.3 MG/DL (ref 0.2–1)
BUN SERPL-MCNC: 43 MG/DL (ref 6–20)
BUN/CREAT SERPL: 44 (ref 12–20)
CALCIUM SERPL-MCNC: 9.8 MG/DL (ref 8.5–10.1)
CHLORIDE SERPL-SCNC: 107 MMOL/L (ref 97–108)
CO2 SERPL-SCNC: 23 MMOL/L (ref 21–32)
CREAT SERPL-MCNC: 0.97 MG/DL (ref 0.55–1.02)
ERYTHROCYTE [DISTWIDTH] IN BLOOD BY AUTOMATED COUNT: 12.6 % (ref 11.5–14.5)
GLOBULIN SER CALC-MCNC: 3.4 G/DL (ref 2–4)
GLUCOSE SERPL-MCNC: 108 MG/DL (ref 65–100)
HCT VFR BLD AUTO: 36.1 % (ref 35–47)
HGB BLD-MCNC: 12.1 G/DL (ref 11.5–16)
MCH RBC QN AUTO: 30.3 PG (ref 26–34)
MCHC RBC AUTO-ENTMCNC: 33.5 G/DL (ref 30–36.5)
MCV RBC AUTO: 90.5 FL (ref 80–99)
NRBC # BLD: 0 K/UL (ref 0–0.01)
NRBC BLD-RTO: 0 PER 100 WBC
PLATELET # BLD AUTO: 300 K/UL (ref 150–400)
PMV BLD AUTO: 9.4 FL (ref 8.9–12.9)
POTASSIUM SERPL-SCNC: 4.2 MMOL/L (ref 3.5–5.1)
PROT SERPL-MCNC: 7.1 G/DL (ref 6.4–8.2)
RBC # BLD AUTO: 3.99 M/UL (ref 3.8–5.2)
SODIUM SERPL-SCNC: 138 MMOL/L (ref 136–145)
WBC # BLD AUTO: 7.1 K/UL (ref 3.6–11)

## 2024-07-31 PROCEDURE — G8400 PT W/DXA NO RESULTS DOC: HCPCS | Performed by: INTERNAL MEDICINE

## 2024-07-31 PROCEDURE — G8419 CALC BMI OUT NRM PARAM NOF/U: HCPCS | Performed by: INTERNAL MEDICINE

## 2024-07-31 PROCEDURE — 3074F SYST BP LT 130 MM HG: CPT | Performed by: INTERNAL MEDICINE

## 2024-07-31 PROCEDURE — 90677 PCV20 VACCINE IM: CPT | Performed by: INTERNAL MEDICINE

## 2024-07-31 PROCEDURE — 1036F TOBACCO NON-USER: CPT | Performed by: INTERNAL MEDICINE

## 2024-07-31 PROCEDURE — PBSHW PNEUMOCOCCAL, PCV20, PREVNAR 20, (AGE 6W+), IM, PF: Performed by: INTERNAL MEDICINE

## 2024-07-31 PROCEDURE — 3078F DIAST BP <80 MM HG: CPT | Performed by: INTERNAL MEDICINE

## 2024-07-31 PROCEDURE — 1090F PRES/ABSN URINE INCON ASSESS: CPT | Performed by: INTERNAL MEDICINE

## 2024-07-31 PROCEDURE — 1123F ACP DISCUSS/DSCN MKR DOCD: CPT | Performed by: INTERNAL MEDICINE

## 2024-07-31 PROCEDURE — 99214 OFFICE O/P EST MOD 30 MIN: CPT | Performed by: INTERNAL MEDICINE

## 2024-07-31 PROCEDURE — G8428 CUR MEDS NOT DOCUMENT: HCPCS | Performed by: INTERNAL MEDICINE

## 2024-07-31 RX ORDER — GABAPENTIN 100 MG/1
CAPSULE ORAL
Qty: 270 CAPSULE | Refills: 0 | Status: SHIPPED | OUTPATIENT
Start: 2024-07-31 | End: 2024-08-01 | Stop reason: SDUPTHER

## 2024-07-31 SDOH — ECONOMIC STABILITY: FOOD INSECURITY: WITHIN THE PAST 12 MONTHS, THE FOOD YOU BOUGHT JUST DIDN'T LAST AND YOU DIDN'T HAVE MONEY TO GET MORE.: NEVER TRUE

## 2024-07-31 SDOH — ECONOMIC STABILITY: HOUSING INSECURITY
IN THE LAST 12 MONTHS, WAS THERE A TIME WHEN YOU DID NOT HAVE A STEADY PLACE TO SLEEP OR SLEPT IN A SHELTER (INCLUDING NOW)?: NO

## 2024-07-31 SDOH — ECONOMIC STABILITY: INCOME INSECURITY: HOW HARD IS IT FOR YOU TO PAY FOR THE VERY BASICS LIKE FOOD, HOUSING, MEDICAL CARE, AND HEATING?: NOT HARD AT ALL

## 2024-07-31 SDOH — ECONOMIC STABILITY: FOOD INSECURITY: WITHIN THE PAST 12 MONTHS, YOU WORRIED THAT YOUR FOOD WOULD RUN OUT BEFORE YOU GOT MONEY TO BUY MORE.: NEVER TRUE

## 2024-07-31 ASSESSMENT — PATIENT HEALTH QUESTIONNAIRE - PHQ9
5. POOR APPETITE OR OVEREATING: NOT AT ALL
3. TROUBLE FALLING OR STAYING ASLEEP: NOT AT ALL
SUM OF ALL RESPONSES TO PHQ9 QUESTIONS 1 & 2: 0
7. TROUBLE CONCENTRATING ON THINGS, SUCH AS READING THE NEWSPAPER OR WATCHING TELEVISION: NOT AT ALL
9. THOUGHTS THAT YOU WOULD BE BETTER OFF DEAD, OR OF HURTING YOURSELF: NOT AT ALL
10. IF YOU CHECKED OFF ANY PROBLEMS, HOW DIFFICULT HAVE THESE PROBLEMS MADE IT FOR YOU TO DO YOUR WORK, TAKE CARE OF THINGS AT HOME, OR GET ALONG WITH OTHER PEOPLE: NOT DIFFICULT AT ALL
2. FEELING DOWN, DEPRESSED OR HOPELESS: NOT AT ALL
SUM OF ALL RESPONSES TO PHQ QUESTIONS 1-9: 0
6. FEELING BAD ABOUT YOURSELF - OR THAT YOU ARE A FAILURE OR HAVE LET YOURSELF OR YOUR FAMILY DOWN: NOT AT ALL
1. LITTLE INTEREST OR PLEASURE IN DOING THINGS: NOT AT ALL
SUM OF ALL RESPONSES TO PHQ QUESTIONS 1-9: 0
SUM OF ALL RESPONSES TO PHQ QUESTIONS 1-9: 0
4. FEELING TIRED OR HAVING LITTLE ENERGY: NOT AT ALL
SUM OF ALL RESPONSES TO PHQ QUESTIONS 1-9: 0
8. MOVING OR SPEAKING SO SLOWLY THAT OTHER PEOPLE COULD HAVE NOTICED. OR THE OPPOSITE, BEING SO FIGETY OR RESTLESS THAT YOU HAVE BEEN MOVING AROUND A LOT MORE THAN USUAL: NOT AT ALL

## 2024-07-31 NOTE — PROGRESS NOTES
Marjorie Ohara (:  1940) is a 84 y.o. female,Established patient, here for evaluation of the following chief complaint(s):  Medication Check (6 month follow up; patient is non fasting)      Assessment & Plan   1. Chemotherapy-induced peripheral neuropathy (HCC)-ongoing sxs maury at night  Discussed pros and cons of increased dose  Will cont with 300 mg total dose for now  Appt in 6mo  -     gabapentin (NEURONTIN) 100 MG capsule; 1 qam and 2 qhs, Disp-270 capsule, R-0Normal  2. HTN, goal below 130/80-cont on lisinopril 2.5 1/2 tab  Intermittent cough-if becomes persistent consider change in med  -     Comprehensive Metabolic Panel; Future  3. Microcytic anemia-using qod iron no bleeding evident  Co fatigue-likely multifactiorial  -     CBC; Future  4. Bipolar II disorder (HCC)-cont with med manager np  Some tremor-discussed likely from her mood meds  5. Need for prophylactic vaccination against Streptococcus pneumoniae (pneumococcus)  -     Pneumococcal, PCV20, PREVNAR 20, (age 18 yrs+), IM, PF      No follow-ups on file.       Subjective   HPI  Seen for med check  continues to struggle with his cancer and she notes he is lost considerable weight and has low energy and low appetite.  They are traveling out of state for a month and a half.    On gabapentin a total of 300 mg a day for neuropathy has ongoing symptoms in feet particularly at night feels as if there is bricks on her feet also has trouble with balance.  Also complains of fatigue.  Discussed pros and cons of increased dose of gabapentin and we will not do that for now.    Hypertension on lisinopril 2.5 mg 1/2 tab daily does have an intermittent cough nonproductive not persistent discussed ACE induced cough I think this is more likely an allergy issue.  If it becomes more persistent can certainly changed from ACE inhibitor blood pressure has been well-controlled.    Microcytic anemia on iron every other day due for levels.  Review of

## 2024-08-01 DIAGNOSIS — I10 HTN, GOAL BELOW 130/80: ICD-10-CM

## 2024-08-01 DIAGNOSIS — T45.1X5A CHEMOTHERAPY-INDUCED PERIPHERAL NEUROPATHY (HCC): ICD-10-CM

## 2024-08-01 DIAGNOSIS — G62.0 CHEMOTHERAPY-INDUCED PERIPHERAL NEUROPATHY (HCC): ICD-10-CM

## 2024-08-01 RX ORDER — LISINOPRIL 2.5 MG/1
1.25 TABLET ORAL DAILY
Qty: 45 TABLET | Refills: 1 | Status: SHIPPED | OUTPATIENT
Start: 2024-08-01

## 2024-08-01 RX ORDER — GABAPENTIN 100 MG/1
CAPSULE ORAL
Qty: 270 CAPSULE | Refills: 0 | Status: SHIPPED | OUTPATIENT
Start: 2024-08-01 | End: 2024-08-31

## 2024-08-01 NOTE — TELEPHONE ENCOUNTER
The patient is requesting a call back to discuss her medication. She stated that she came in for her appointment an her medication is still not at the pharmacy. She stated that the pharmacy close at 4:00 pm today an she also is leaving for a trip today. She will like for the medication to be expedited     gabapentin (NEURONTIN) 100 MG capsule     [facility]/pharmacy #[id number] - [place], [place] - [address] [phone] - F [phone]

## 2024-08-09 DIAGNOSIS — K21.9 GASTRO-ESOPHAGEAL REFLUX DISEASE WITHOUT ESOPHAGITIS: ICD-10-CM

## 2024-08-09 RX ORDER — OMEPRAZOLE 20 MG/1
CAPSULE, DELAYED RELEASE ORAL
Qty: 90 CAPSULE | Refills: 1 | Status: SHIPPED | OUTPATIENT
Start: 2024-08-09

## 2024-08-12 DIAGNOSIS — G62.0 CHEMOTHERAPY-INDUCED PERIPHERAL NEUROPATHY (HCC): ICD-10-CM

## 2024-08-12 DIAGNOSIS — T45.1X5A CHEMOTHERAPY-INDUCED PERIPHERAL NEUROPATHY (HCC): ICD-10-CM

## 2024-08-12 DIAGNOSIS — D50.9 MICROCYTIC ANEMIA: ICD-10-CM

## 2024-08-12 DIAGNOSIS — K21.9 GASTRO-ESOPHAGEAL REFLUX DISEASE WITHOUT ESOPHAGITIS: ICD-10-CM

## 2024-08-12 RX ORDER — GABAPENTIN 100 MG/1
CAPSULE ORAL
Qty: 270 CAPSULE | Refills: 0 | Status: SHIPPED | OUTPATIENT
Start: 2024-08-12 | End: 2024-09-11

## 2024-08-12 RX ORDER — DULOXETIN HYDROCHLORIDE 60 MG/1
60 CAPSULE, DELAYED RELEASE ORAL DAILY
Qty: 90 CAPSULE | Refills: 1 | Status: SHIPPED | OUTPATIENT
Start: 2024-08-12

## 2024-08-12 RX ORDER — FERROUS GLUCONATE 324(38)MG
324 TABLET ORAL EVERY OTHER DAY
Qty: 45 TABLET | Refills: 1 | Status: SHIPPED | OUTPATIENT
Start: 2024-08-12

## 2024-08-12 RX ORDER — OMEPRAZOLE 20 MG/1
CAPSULE, DELAYED RELEASE ORAL
Qty: 90 CAPSULE | Refills: 1 | Status: SHIPPED | OUTPATIENT
Start: 2024-08-12

## 2024-08-12 NOTE — TELEPHONE ENCOUNTER
Medication refills for     gabapentin (NEURONTIN) 100 MG capsule - travel overide [place] out of state     Send to   [facility]/pharmacy #[id number] - [place], [place] - [address] [facility] - P [phone] - f [phone]  ----------------------------------------------------------------------------------------------------------------------    ferrous gluconate (FERGON) 324 (38 Fe) MG tablet   DULoxetine (CYMBALTA) 60 MG extended release capsule   omeprazole (PRILOSEC) 20 MG delayed release capsule     Send to  [facility]/pharmacy #[id number] - [place] - [address] - P [phone] - f [phone]

## 2024-09-13 DIAGNOSIS — D50.9 MICROCYTIC ANEMIA: ICD-10-CM

## 2024-09-13 RX ORDER — FERROUS GLUCONATE 324(38)MG
324 TABLET ORAL EVERY OTHER DAY
Qty: 45 TABLET | Refills: 1 | Status: SHIPPED | OUTPATIENT
Start: 2024-09-13

## 2024-11-20 ENCOUNTER — TELEMEDICINE (OUTPATIENT)
Age: 84
End: 2024-11-20

## 2024-11-20 DIAGNOSIS — G31.84 MILD COGNITIVE IMPAIRMENT OF UNCERTAIN OR UNKNOWN ETIOLOGY: Primary | ICD-10-CM

## 2025-02-09 DIAGNOSIS — T45.1X5A CHEMOTHERAPY-INDUCED PERIPHERAL NEUROPATHY (HCC): ICD-10-CM

## 2025-02-09 DIAGNOSIS — K21.9 GASTRO-ESOPHAGEAL REFLUX DISEASE WITHOUT ESOPHAGITIS: ICD-10-CM

## 2025-02-09 DIAGNOSIS — G62.0 CHEMOTHERAPY-INDUCED PERIPHERAL NEUROPATHY (HCC): ICD-10-CM

## 2025-02-09 RX ORDER — GABAPENTIN 100 MG/1
CAPSULE ORAL
Qty: 90 CAPSULE | Refills: 0 | Status: SHIPPED | OUTPATIENT
Start: 2025-02-09 | End: 2025-03-09

## 2025-02-24 ENCOUNTER — TELEPHONE (OUTPATIENT)
Facility: CLINIC | Age: 85
End: 2025-02-24

## 2025-02-24 NOTE — TELEPHONE ENCOUNTER
Pt dropped off form she needs signed by Dr Ochoa. She is requesting a copy for her to  and if It needs to be faxed she said it can be. Also she stated no stamp has to be Drs signature please. Please let her know when form is ready

## 2025-03-23 DIAGNOSIS — T45.1X5A CHEMOTHERAPY-INDUCED PERIPHERAL NEUROPATHY: ICD-10-CM

## 2025-03-23 DIAGNOSIS — G62.0 CHEMOTHERAPY-INDUCED PERIPHERAL NEUROPATHY: ICD-10-CM

## 2025-03-24 ENCOUNTER — TELEPHONE (OUTPATIENT)
Facility: CLINIC | Age: 85
End: 2025-03-24

## 2025-03-24 RX ORDER — GABAPENTIN 100 MG/1
CAPSULE ORAL
Qty: 90 CAPSULE | Refills: 0 | Status: SHIPPED | OUTPATIENT
Start: 2025-03-24 | End: 2025-04-22

## 2025-03-24 NOTE — TELEPHONE ENCOUNTER
Patient is calling requesting refill for \"gabapentin (NEURONTIN) 100 MG capsule\". Please send to the pharmacy down below.       [facility]/pharmacy #[id number] - [place] [address] [phone] - f [phone]

## 2025-03-24 NOTE — TELEPHONE ENCOUNTER
She needs to 1st confirm an appt for further refills. She was last seen in the summer & provider needs to see her at least every 6 months to continue refills. This is standard for all controlled meds. Please assist with scheduling.

## 2025-04-02 DIAGNOSIS — D50.9 MICROCYTIC ANEMIA: ICD-10-CM

## 2025-04-02 DIAGNOSIS — I10 HTN, GOAL BELOW 130/80: ICD-10-CM

## 2025-04-02 RX ORDER — LISINOPRIL 2.5 MG/1
1.25 TABLET ORAL DAILY
Qty: 45 TABLET | Refills: 1 | Status: SHIPPED | OUTPATIENT
Start: 2025-04-02

## 2025-04-02 RX ORDER — FERROUS GLUCONATE 324(38)MG
324 TABLET ORAL EVERY OTHER DAY
Qty: 30 TABLET | Refills: 0 | Status: SHIPPED | OUTPATIENT
Start: 2025-04-02

## 2025-04-02 NOTE — TELEPHONE ENCOUNTER
Last visit 7/31/24  Follow up 4/9/25    Lab Results   Component Value Date     07/31/2024    K 4.2 07/31/2024     07/31/2024    CO2 23 07/31/2024    BUN 43 (H) 07/31/2024    CREATININE 0.97 07/31/2024    GLUCOSE 108 (H) 07/31/2024    CALCIUM 9.8 07/31/2024    BILITOT 0.3 07/31/2024    ALKPHOS 84 07/31/2024    AST 25 07/31/2024    ALT 40 07/31/2024    LABGLOM 58 (L) 07/31/2024    GFRAA 56 (L) 11/01/2021    AGRATIO 1.1 11/22/2022    GLOB 3.4 07/31/2024     Lab Results   Component Value Date    WBC 7.1 07/31/2024    HGB 12.1 07/31/2024    HCT 36.1 07/31/2024    MCV 90.5 07/31/2024     07/31/2024

## 2025-04-07 ENCOUNTER — TELEPHONE (OUTPATIENT)
Facility: CLINIC | Age: 85
End: 2025-04-07

## 2025-04-07 SDOH — ECONOMIC STABILITY: TRANSPORTATION INSECURITY
IN THE PAST 12 MONTHS, HAS LACK OF TRANSPORTATION KEPT YOU FROM MEETINGS, WORK, OR FROM GETTING THINGS NEEDED FOR DAILY LIVING?: NO

## 2025-04-07 SDOH — HEALTH STABILITY: PHYSICAL HEALTH: ON AVERAGE, HOW MANY DAYS PER WEEK DO YOU ENGAGE IN MODERATE TO STRENUOUS EXERCISE (LIKE A BRISK WALK)?: 5 DAYS

## 2025-04-07 SDOH — ECONOMIC STABILITY: FOOD INSECURITY: WITHIN THE PAST 12 MONTHS, THE FOOD YOU BOUGHT JUST DIDN'T LAST AND YOU DIDN'T HAVE MONEY TO GET MORE.: NEVER TRUE

## 2025-04-07 SDOH — ECONOMIC STABILITY: FOOD INSECURITY: WITHIN THE PAST 12 MONTHS, YOU WORRIED THAT YOUR FOOD WOULD RUN OUT BEFORE YOU GOT MONEY TO BUY MORE.: NEVER TRUE

## 2025-04-07 SDOH — HEALTH STABILITY: PHYSICAL HEALTH: ON AVERAGE, HOW MANY MINUTES DO YOU ENGAGE IN EXERCISE AT THIS LEVEL?: 30 MIN

## 2025-04-07 SDOH — ECONOMIC STABILITY: INCOME INSECURITY: IN THE LAST 12 MONTHS, WAS THERE A TIME WHEN YOU WERE NOT ABLE TO PAY THE MORTGAGE OR RENT ON TIME?: NO

## 2025-04-07 SDOH — ECONOMIC STABILITY: TRANSPORTATION INSECURITY
IN THE PAST 12 MONTHS, HAS THE LACK OF TRANSPORTATION KEPT YOU FROM MEDICAL APPOINTMENTS OR FROM GETTING MEDICATIONS?: NO

## 2025-04-07 ASSESSMENT — LIFESTYLE VARIABLES
HOW MANY STANDARD DRINKS CONTAINING ALCOHOL DO YOU HAVE ON A TYPICAL DAY: 1 OR 2
HOW MANY STANDARD DRINKS CONTAINING ALCOHOL DO YOU HAVE ON A TYPICAL DAY: 1
HOW OFTEN DO YOU HAVE SIX OR MORE DRINKS ON ONE OCCASION: 1
HOW OFTEN DO YOU HAVE A DRINK CONTAINING ALCOHOL: 2
HOW OFTEN DO YOU HAVE A DRINK CONTAINING ALCOHOL: MONTHLY OR LESS

## 2025-04-07 ASSESSMENT — PATIENT HEALTH QUESTIONNAIRE - PHQ9
2. FEELING DOWN, DEPRESSED OR HOPELESS: NOT AT ALL
SUM OF ALL RESPONSES TO PHQ QUESTIONS 1-9: 0
SUM OF ALL RESPONSES TO PHQ QUESTIONS 1-9: 0
1. LITTLE INTEREST OR PLEASURE IN DOING THINGS: NOT AT ALL
SUM OF ALL RESPONSES TO PHQ QUESTIONS 1-9: 0
SUM OF ALL RESPONSES TO PHQ QUESTIONS 1-9: 0

## 2025-04-09 ENCOUNTER — OFFICE VISIT (OUTPATIENT)
Facility: CLINIC | Age: 85
End: 2025-04-09
Payer: MEDICARE

## 2025-04-09 VITALS
SYSTOLIC BLOOD PRESSURE: 132 MMHG | HEART RATE: 76 BPM | TEMPERATURE: 97.5 F | DIASTOLIC BLOOD PRESSURE: 74 MMHG | OXYGEN SATURATION: 97 % | RESPIRATION RATE: 16 BRPM | HEIGHT: 61 IN | WEIGHT: 140.8 LBS | BODY MASS INDEX: 26.58 KG/M2

## 2025-04-09 DIAGNOSIS — K59.09 CHRONIC CONSTIPATION: ICD-10-CM

## 2025-04-09 DIAGNOSIS — Z78.0 POSTMENOPAUSAL: ICD-10-CM

## 2025-04-09 DIAGNOSIS — I10 HTN, GOAL BELOW 130/80: ICD-10-CM

## 2025-04-09 DIAGNOSIS — R53.82 CHRONIC FATIGUE: ICD-10-CM

## 2025-04-09 DIAGNOSIS — T45.1X5A CHEMOTHERAPY-INDUCED PERIPHERAL NEUROPATHY: ICD-10-CM

## 2025-04-09 DIAGNOSIS — D50.9 MICROCYTIC ANEMIA: ICD-10-CM

## 2025-04-09 DIAGNOSIS — G62.0 CHEMOTHERAPY-INDUCED PERIPHERAL NEUROPATHY: ICD-10-CM

## 2025-04-09 DIAGNOSIS — E61.1 IRON DEFICIENCY: ICD-10-CM

## 2025-04-09 DIAGNOSIS — K21.9 GASTRO-ESOPHAGEAL REFLUX DISEASE WITHOUT ESOPHAGITIS: ICD-10-CM

## 2025-04-09 DIAGNOSIS — F31.81 BIPOLAR II DISORDER (HCC): ICD-10-CM

## 2025-04-09 DIAGNOSIS — Z00.00 MEDICARE ANNUAL WELLNESS VISIT, SUBSEQUENT: Primary | ICD-10-CM

## 2025-04-09 DIAGNOSIS — C50.112 MALIGNANT NEOPLASM OF CENTRAL PORTION OF LEFT FEMALE BREAST, UNSPECIFIED ESTROGEN RECEPTOR STATUS: ICD-10-CM

## 2025-04-09 LAB
ALBUMIN SERPL-MCNC: 4.2 G/DL (ref 3.5–5)
ALBUMIN/GLOB SERPL: 1.3 (ref 1.1–2.2)
ALP SERPL-CCNC: 93 U/L (ref 45–117)
ALT SERPL-CCNC: 36 U/L (ref 12–78)
ANION GAP SERPL CALC-SCNC: 4 MMOL/L (ref 2–12)
AST SERPL-CCNC: 25 U/L (ref 15–37)
BILIRUB SERPL-MCNC: 0.6 MG/DL (ref 0.2–1)
BUN SERPL-MCNC: 27 MG/DL (ref 6–20)
BUN/CREAT SERPL: 25 (ref 12–20)
CALCIUM SERPL-MCNC: 9.8 MG/DL (ref 8.5–10.1)
CHLORIDE SERPL-SCNC: 105 MMOL/L (ref 97–108)
CO2 SERPL-SCNC: 29 MMOL/L (ref 21–32)
CREAT SERPL-MCNC: 1.06 MG/DL (ref 0.55–1.02)
ERYTHROCYTE [DISTWIDTH] IN BLOOD BY AUTOMATED COUNT: 12.3 % (ref 11.5–14.5)
FERRITIN SERPL-MCNC: 45 NG/ML (ref 8–252)
GLOBULIN SER CALC-MCNC: 3.2 G/DL (ref 2–4)
GLUCOSE SERPL-MCNC: 97 MG/DL (ref 65–100)
HCT VFR BLD AUTO: 40.5 % (ref 35–47)
HGB BLD-MCNC: 13.4 G/DL (ref 11.5–16)
MCH RBC QN AUTO: 30.1 PG (ref 26–34)
MCHC RBC AUTO-ENTMCNC: 33.1 G/DL (ref 30–36.5)
MCV RBC AUTO: 91 FL (ref 80–99)
NRBC # BLD: 0 K/UL (ref 0–0.01)
NRBC BLD-RTO: 0 PER 100 WBC
PLATELET # BLD AUTO: 298 K/UL (ref 150–400)
PMV BLD AUTO: 9.6 FL (ref 8.9–12.9)
POTASSIUM SERPL-SCNC: 4.8 MMOL/L (ref 3.5–5.1)
PROT SERPL-MCNC: 7.4 G/DL (ref 6.4–8.2)
RBC # BLD AUTO: 4.45 M/UL (ref 3.8–5.2)
SODIUM SERPL-SCNC: 138 MMOL/L (ref 136–145)
TSH SERPL DL<=0.05 MIU/L-ACNC: 1.06 UIU/ML (ref 0.36–3.74)
WBC # BLD AUTO: 4.9 K/UL (ref 3.6–11)

## 2025-04-09 PROCEDURE — 99214 OFFICE O/P EST MOD 30 MIN: CPT | Performed by: INTERNAL MEDICINE

## 2025-04-09 RX ORDER — GABAPENTIN 100 MG/1
CAPSULE ORAL
Qty: 90 CAPSULE | Refills: 5 | Status: SHIPPED | OUTPATIENT
Start: 2025-04-09 | End: 2025-10-09

## 2025-04-09 RX ORDER — FERROUS GLUCONATE 324(38)MG
324 TABLET ORAL EVERY OTHER DAY
Qty: 30 TABLET | Refills: 0 | Status: SHIPPED | OUTPATIENT
Start: 2025-04-09

## 2025-04-09 ASSESSMENT — PATIENT HEALTH QUESTIONNAIRE - PHQ9
SUM OF ALL RESPONSES TO PHQ QUESTIONS 1-9: 0
5. POOR APPETITE OR OVEREATING: NOT AT ALL
6. FEELING BAD ABOUT YOURSELF - OR THAT YOU ARE A FAILURE OR HAVE LET YOURSELF OR YOUR FAMILY DOWN: NOT AT ALL
SUM OF ALL RESPONSES TO PHQ QUESTIONS 1-9: 0
SUM OF ALL RESPONSES TO PHQ QUESTIONS 1-9: 0
1. LITTLE INTEREST OR PLEASURE IN DOING THINGS: NOT AT ALL
2. FEELING DOWN, DEPRESSED OR HOPELESS: NOT AT ALL
9. THOUGHTS THAT YOU WOULD BE BETTER OFF DEAD, OR OF HURTING YOURSELF: NOT AT ALL
7. TROUBLE CONCENTRATING ON THINGS, SUCH AS READING THE NEWSPAPER OR WATCHING TELEVISION: NOT AT ALL
SUM OF ALL RESPONSES TO PHQ QUESTIONS 1-9: 0
4. FEELING TIRED OR HAVING LITTLE ENERGY: NOT AT ALL
3. TROUBLE FALLING OR STAYING ASLEEP: NOT AT ALL
10. IF YOU CHECKED OFF ANY PROBLEMS, HOW DIFFICULT HAVE THESE PROBLEMS MADE IT FOR YOU TO DO YOUR WORK, TAKE CARE OF THINGS AT HOME, OR GET ALONG WITH OTHER PEOPLE: NOT DIFFICULT AT ALL
8. MOVING OR SPEAKING SO SLOWLY THAT OTHER PEOPLE COULD HAVE NOTICED. OR THE OPPOSITE, BEING SO FIGETY OR RESTLESS THAT YOU HAVE BEEN MOVING AROUND A LOT MORE THAN USUAL: NOT AT ALL

## 2025-04-09 NOTE — ASSESSMENT & PLAN NOTE
Discussed pros and cons of gabapentin.  The 300 mg a day may be contributing to her weight struggles and also to feeling of being off balance.  Encouraged to try to decrease to 200 mg a day.  Prescription still written that she can take up to 300 mg in a day    Orders:    gabapentin (NEURONTIN) 100 MG capsule; TAKE 1 CAPSULE BY MOUTH IN THE MORNING AND 2 CAPSULES AT BEDTIME *

## 2025-04-09 NOTE — PROGRESS NOTES
estrogen receptor status  Assessment & Plan:  No evidence of recurrence         HTN, goal below 130/80  -     Comprehensive Metabolic Panel; Future  Postmenopausal  Gastro-esophageal reflux disease without esophagitis  Iron deficiency  -     CBC; Future  -     Ferritin; Future  Chemotherapy-induced peripheral neuropathy  Assessment & Plan:  Discussed pros and cons of gabapentin.  The 300 mg a day may be contributing to her weight struggles and also to feeling of being off balance.  Encouraged to try to decrease to 200 mg a day.  Prescription still written that she can take up to 300 mg in a day    Orders:    gabapentin (NEURONTIN) 100 MG capsule; TAKE 1 CAPSULE BY MOUTH IN THE MORNING AND 2 CAPSULES AT BEDTIME *    Orders:  -     gabapentin (NEURONTIN) 100 MG capsule; TAKE 1 CAPSULE BY MOUTH IN THE MORNING AND 2 CAPSULES AT BEDTIME *, Disp-90 capsule, R-5Normal  Chronic fatigue  -     TSH; Future  Chronic constipation  Microcytic anemia  -     ferrous gluconate (FERGON) 324 (38 Fe) MG tablet; Take 1 tablet by mouth every other day, Disp-30 tablet, R-0Normal       No follow-ups on file.     Subjective       Patient's complete Health Risk Assessment and screening values have been reviewed and are found in Flowsheets. The following problems were reviewed today and where indicated follow up appointments were made and/or referrals ordered.    Positive Risk Factor Screenings with Interventions:    Fall Risk:  Do you feel unsteady or are you worried about falling? : (!) (Proxy-Rptd) yes  2 or more falls in past year?: (Proxy-Rptd) no  Fall with injury in past year?: (Proxy-Rptd) no     Interventions:    Reviewed medications, home hazards, visual acuity, and co-morbidities that can increase risk for falls                Dentist Screen:  Have you seen the dentist within the past year?: (!) (Proxy-Rptd) No    Intervention:  Advised to schedule with their dentist     Vision Screen:  Do you have difficulty driving, watching TV,

## 2025-04-10 ENCOUNTER — RESULTS FOLLOW-UP (OUTPATIENT)
Facility: CLINIC | Age: 85
End: 2025-04-10

## 2025-05-24 DIAGNOSIS — D50.9 MICROCYTIC ANEMIA: ICD-10-CM

## 2025-05-27 RX ORDER — FERROUS GLUCONATE 324(38)MG
324 TABLET ORAL EVERY OTHER DAY
Qty: 45 TABLET | Refills: 1 | Status: SHIPPED | OUTPATIENT
Start: 2025-05-27

## 2025-05-27 NOTE — TELEPHONE ENCOUNTER
Last visit 4/9/25  No follow up scheduled     Lab Results   Component Value Date     04/09/2025    K 4.8 04/09/2025     04/09/2025    CO2 29 04/09/2025    BUN 27 (H) 04/09/2025    CREATININE 1.06 (H) 04/09/2025    GLUCOSE 97 04/09/2025    CALCIUM 9.8 04/09/2025    BILITOT 0.6 04/09/2025    ALKPHOS 93 04/09/2025    AST 25 04/09/2025    ALT 36 04/09/2025    LABGLOM 52 (L) 04/09/2025    GFRAA 56 (L) 11/01/2021    AGRATIO 1.1 11/22/2022    GLOB 3.2 04/09/2025     Lab Results   Component Value Date    WBC 4.9 04/09/2025    HGB 13.4 04/09/2025    HCT 40.5 04/09/2025    MCV 91.0 04/09/2025     04/09/2025

## 2025-08-04 ENCOUNTER — TELEPHONE (OUTPATIENT)
Facility: CLINIC | Age: 85
End: 2025-08-04

## 2025-08-04 DIAGNOSIS — K21.9 GASTRO-ESOPHAGEAL REFLUX DISEASE WITHOUT ESOPHAGITIS: ICD-10-CM

## 2025-08-04 RX ORDER — OMEPRAZOLE 20 MG/1
CAPSULE, DELAYED RELEASE ORAL
Qty: 90 CAPSULE | Refills: 0 | Status: SHIPPED | OUTPATIENT
Start: 2025-08-04

## (undated) DEVICE — Device

## (undated) DEVICE — KENDALL RADIOLUCENT FOAM MONITORING ELECTRODE -RECTANGULAR SHAPE: Brand: KENDALL

## (undated) DEVICE — CANN NASAL O2 CAPNOGRAPHY AD -- FILTERLINE

## (undated) DEVICE — 1200 GUARD II KIT W/5MM TUBE W/O VAC TUBE: Brand: GUARDIAN

## (undated) DEVICE — SIMPLICITY FLUFF UNDERPAD 23X36, MODERATE: Brand: SIMPLICITY

## (undated) DEVICE — ADULT SPO2 SENSOR: Brand: NELLCOR

## (undated) DEVICE — SET GRAV CK VLV NEEDLESS ST 3 GANGED 4WAY STPCOCK HI FLO 10

## (undated) DEVICE — CATH IV AUTOGRD BC PNK 20GA 25 -- INSYTE

## (undated) DEVICE — KIT COLON W/ 1.1OZ LUB AND 2 END

## (undated) DEVICE — BAG BELONG PT PERS CLEAR HANDL

## (undated) DEVICE — SOLIDIFIER MEDC 1200ML -- CONVERT TO 356117

## (undated) DEVICE — 3M™ CUROS™ DISINFECTING CAP FOR NEEDLELESS CONNECTORS 270/CARTON 20 CARTONS/CASE CFF1-270: Brand: CUROS™